# Patient Record
Sex: MALE | Race: BLACK OR AFRICAN AMERICAN | NOT HISPANIC OR LATINO | Employment: OTHER | URBAN - METROPOLITAN AREA
[De-identification: names, ages, dates, MRNs, and addresses within clinical notes are randomized per-mention and may not be internally consistent; named-entity substitution may affect disease eponyms.]

---

## 2017-01-18 ENCOUNTER — TELEPHONE (OUTPATIENT)
Dept: FAMILY MEDICINE | Facility: CLINIC | Age: 42
End: 2017-01-18

## 2017-01-18 NOTE — TELEPHONE ENCOUNTER
----- Message from Shahla Saha MD sent at 1/18/2017  8:32 AM CST -----  Please notify patient that Vitamin D level was slightly low.  Recommend oral Vitamin D3, 2000 IU's per day.    B12 was normal.

## 2017-01-20 ENCOUNTER — TELEPHONE (OUTPATIENT)
Dept: FAMILY MEDICINE | Facility: CLINIC | Age: 42
End: 2017-01-20

## 2017-01-20 NOTE — TELEPHONE ENCOUNTER
Okay Norvasc removed from list.  Patient may come in for f/u blood pressure visit in 1 month.  Also please clarify who he wants as PCP.

## 2017-01-20 NOTE — TELEPHONE ENCOUNTER
Patient wanted provider to know that when he was taking his Hyzaar 100/25mg and Norvasc 5 mg, he was experiencing some chest discomfort. He said that he took his blood pressure and it was 125/70. He said that the next day he stopped the Norvasc and has not had anymore chest discomfort.

## 2017-01-26 ENCOUNTER — HOSPITAL ENCOUNTER (EMERGENCY)
Facility: HOSPITAL | Age: 42
Discharge: HOME | End: 2017-01-26
Payer: COMMERCIAL

## 2017-01-26 VITALS
SYSTOLIC BLOOD PRESSURE: 135 MMHG | OXYGEN SATURATION: 96 % | DIASTOLIC BLOOD PRESSURE: 83 MMHG | TEMPERATURE: 98 F | WEIGHT: 290 LBS | HEART RATE: 85 BPM | HEIGHT: 73 IN | RESPIRATION RATE: 18 BRPM | BODY MASS INDEX: 38.43 KG/M2

## 2017-01-26 DIAGNOSIS — R07.9 CHEST PAIN, UNSPECIFIED TYPE: Primary | ICD-10-CM

## 2017-01-26 LAB
ALBUMIN SERPL BCP-MCNC: 3.8 G/DL
ALP SERPL-CCNC: 66 U/L
ALT SERPL W/O P-5'-P-CCNC: 29 U/L
ANION GAP SERPL CALC-SCNC: 8 MMOL/L
AST SERPL-CCNC: 23 U/L
BASOPHILS # BLD AUTO: 0.02 K/UL
BASOPHILS NFR BLD: 0.3 %
BILIRUB SERPL-MCNC: 0.5 MG/DL
BNP SERPL-MCNC: 12 PG/ML
BUN SERPL-MCNC: 16 MG/DL
CALCIUM SERPL-MCNC: 9.2 MG/DL
CHLORIDE SERPL-SCNC: 105 MMOL/L
CO2 SERPL-SCNC: 27 MMOL/L
CREAT SERPL-MCNC: 1.4 MG/DL
DIFFERENTIAL METHOD: ABNORMAL
EOSINOPHIL # BLD AUTO: 0.2 K/UL
EOSINOPHIL NFR BLD: 3.3 %
ERYTHROCYTE [DISTWIDTH] IN BLOOD BY AUTOMATED COUNT: 13.7 %
EST. GFR  (AFRICAN AMERICAN): >60 ML/MIN/1.73 M^2
EST. GFR  (NON AFRICAN AMERICAN): >60 ML/MIN/1.73 M^2
GLUCOSE SERPL-MCNC: 97 MG/DL
HCT VFR BLD AUTO: 39.6 %
HGB BLD-MCNC: 13.9 G/DL
INR PPP: 1
LYMPHOCYTES # BLD AUTO: 3 K/UL
LYMPHOCYTES NFR BLD: 40.7 %
MCH RBC QN AUTO: 30.4 PG
MCHC RBC AUTO-ENTMCNC: 35.1 %
MCV RBC AUTO: 87 FL
MONOCYTES # BLD AUTO: 0.8 K/UL
MONOCYTES NFR BLD: 10.9 %
NEUTROPHILS # BLD AUTO: 3.3 K/UL
NEUTROPHILS NFR BLD: 44.8 %
PLATELET # BLD AUTO: 221 K/UL
PMV BLD AUTO: 9.1 FL
POTASSIUM SERPL-SCNC: 3.7 MMOL/L
PROT SERPL-MCNC: 7.6 G/DL
PROTHROMBIN TIME: 10.8 SEC
RBC # BLD AUTO: 4.57 M/UL
SODIUM SERPL-SCNC: 140 MMOL/L
TROPONIN I SERPL DL<=0.01 NG/ML-MCNC: 0.01 NG/ML
WBC # BLD AUTO: 7.28 K/UL

## 2017-01-26 PROCEDURE — 85610 PROTHROMBIN TIME: CPT

## 2017-01-26 PROCEDURE — 25000003 PHARM REV CODE 250: Performed by: PHYSICIAN ASSISTANT

## 2017-01-26 PROCEDURE — 80053 COMPREHEN METABOLIC PANEL: CPT

## 2017-01-26 PROCEDURE — 99284 EMERGENCY DEPT VISIT MOD MDM: CPT

## 2017-01-26 PROCEDURE — 93005 ELECTROCARDIOGRAM TRACING: CPT

## 2017-01-26 PROCEDURE — 84484 ASSAY OF TROPONIN QUANT: CPT

## 2017-01-26 PROCEDURE — 83880 ASSAY OF NATRIURETIC PEPTIDE: CPT

## 2017-01-26 PROCEDURE — 85025 COMPLETE CBC W/AUTO DIFF WBC: CPT

## 2017-01-26 RX ORDER — ASPIRIN 325 MG
325 TABLET ORAL
Status: COMPLETED | OUTPATIENT
Start: 2017-01-26 | End: 2017-01-26

## 2017-01-26 RX ADMIN — ASPIRIN 325 MG ORAL TABLET 325 MG: 325 PILL ORAL at 09:01

## 2017-01-26 NOTE — ED AVS SNAPSHOT
[facility]  [place] [address]               Layla Siegel   2017  9:08 PM   ED    Description:  Male : 1975   Department:  [facility]           Your Care was Coordinated By:     Provider Role From To    Raul Braun MD Attending Provider 17 --    Justin Marshall PA-C Physician Assistant 17 --      Reason for Visit     Chest Pain           Diagnoses this Visit        Comments    Chest pain, unspecified type    -  Primary       ED Disposition     None           To Do List           Follow-up Information     Go to [facility].    Specialty:  Emergency Medicine    Why:  If symptoms worsen    Contact information:    [place] [address] [phone]        Follow up with Mumtaz Loera MD. Schedule an appointment as soon as possible for a visit in 1 day.    Specialty:  Family Medicine    Why:  For follow-up care    Contact information:    [address] [place] [address]  [phone]         These Medications        Disp Refills Start End    ranitidine (ZANTAC) 150 MG capsule 15 capsule 0 2017    Take 1 capsule (150 mg total) by mouth nightly. - Oral    Pharmacy: [id number]  [address]O [address] Ph #: [phone]         [facility] On Call     [facility] On Call Nurse Care Line -  Assistance  Registered nurses in the [facility] provide clinical advisement, health education, appointment booking, and other advisory services.  Call for this free service at [phone].             Medications           Message regarding Medications     Verify the changes and/or additions to your medication regime listed below are the same as discussed with your clinician today.  If any of these changes or additions are incorrect, please notify your healthcare provider.        START taking these NEW medications         "Refills    ranitidine (ZANTAC) 150 MG capsule 0    Sig: Take 1 capsule (150 mg total) by mouth nightly.    Class: Print    Route: Oral      These medications were administered today        Dose Freq    aspirin tablet 325 mg 325 mg ED 1 Time    Sig: Take 1 tablet (325 mg total) by mouth ED 1 Time.    Class: Normal    Route: Oral    Cosign for Ordering: Required by Raul Braun MD      STOP taking these medications     atorvastatin (LIPITOR) 10 MG tablet Take 1 tablet (10 mg total) by mouth once daily.           Verify that the below list of medications is an accurate representation of the medications you are currently taking.  If none reported, the list may be blank. If incorrect, please contact your healthcare provider. Carry this list with you in case of emergency.           Current Medications     losartan-hydrochlorothiazide 100-25 mg (HYZAAR) 100-25 mg per tablet Take 1 tablet by mouth once daily.    ranitidine (ZANTAC) 150 MG capsule Take 1 capsule (150 mg total) by mouth nightly.           Clinical Reference Information           Your Vitals Were     BP Pulse Temp Resp Height Weight    133/50 (BP Location: Right arm, Patient Position: Sitting) 91 98.7 °F (37.1 °C) (Oral) 18 6' 1" (1.854 m) 131.5 kg (290 lb)    SpO2 BMI             96% 38.26 kg/m2         Allergies as of 1/26/2017     No Known Allergies      Immunizations Administered on Date of Encounter - 1/26/2017     None      ED Micro, Lab, POCT     Start Ordered       Status Ordering Provider    01/26/17 2052 01/26/17 2051  CBC auto differential  Once      Final result     01/26/17 2052 01/26/17 2051  Comprehensive metabolic panel  Once      Final result     01/26/17 2052 01/26/17 2051  Brain natriuretic peptide  Once      Final result     01/26/17 2052 01/26/17 2051  Troponin I  Once      Final result     01/26/17 2052 01/26/17 2051  Protime-INR  Once      Final result       ED Imaging Orders     Start Ordered       Status Ordering Provider    " 01/26/17 2052 01/26/17 2051  X-Ray Chest PA And Lateral  1 time imaging      Final result       Discharge References/Attachments     CHEST PAIN, UNCERTAIN CAUSE (ENGLISH)      Your Scheduled Appointments     Mar 31, 2017  1:00 PM CDT   Consult with Henrique Slater MD   [facility]    [address] [facility]  [place] [address]   [phone]              [facility] Sign-Up     Activating your [facility] account is as easy as 1-2-3!     1) Visit [email or web address], select Sign Up Now, enter this activation code and your date of birth, then select Next.  [email or web address]  Expires: 3/12/2017  9:59 PM      2) Create a username and password to use when you visit [facility] in the future and select a security question in case you lose your password and select Next.    3) Enter your e-mail address and click Sign Up!    Additional Information  If you have questions, please e-mail [email or web address] or call [phone] to talk to our [facility] staff. Remember, [facility] is NOT to be used for urgent needs. For medical emergencies, dial 911.          [facility] complies with applicable Federal civil rights laws and does not discriminate on the basis of race, color, national origin, age, disability, or sex.        Language Assistance Services     ATTENTION: Language assistance services are available, free of charge. Please call [phone].      ATENCIÓN: Si habla español, tiene a argueta disposición servicios gratuitos de asistencia lingüística. Llame al [phone].     CHÚ Ý: N?u b?n nói Ti?ng Vi?t, có các d?ch v? h? tr? ngôn ng? mi?n phí dành cho b?n. G?i s? [phone].

## 2017-01-27 NOTE — ED TRIAGE NOTES
Pt states that chest pain for a while and progressively gotten worse.  Chest pain across the upper portion of chest wall.  Denies any radiating of pain.  Denies any nausea, vomiting or diarrhea.  Denies any dizziness or lightheadedness.

## 2017-01-27 NOTE — ED PROVIDER NOTES
Encounter Date: 1/26/2017       History     Chief Complaint   Patient presents with    Chest Pain     all day my chest has been feeling funny, stabbing pain on one side of chest. intermittant.     Review of patient's allergies indicates:  No Known Allergies  HPI Comments: This is a 41-year-old male with hypertension who presents to the ED complaining of intermittent chest pain ×2 days.  He is currently asymptomatic with the last episode occurring earlier this evening.  He describes the pain as sore, like a pulled muscle.  The pain is slightly left of midline.  The pain last a few minutes and has resolved spontaneously.  He is not aware of anything that provokes or causes the pain.  He denies heavy lifting recently.  He denies fever, cough, nausea vomiting, diaphoresis, syncope or dizziness, leg swelling or calf pain.    The history is provided by the patient.     Past Medical History   Diagnosis Date    High cholesterol     Hypertension      Past Medical History Pertinent Negatives   Diagnosis Date Noted    Arthritis 12/16/2014    CHF (congestive heart failure) 12/16/2014    Seizures 12/16/2014    Stroke 12/16/2014     Past Surgical History   Procedure Laterality Date    Colonoscopy N/A 12/15/2015     Procedure: COLONOSCOPY;  Surgeon: Horace Bella MD;  Location: [facility];  Service: Endoscopy;  Laterality: N/A;     Family History   Problem Relation Age of Onset    Hypertension Mother     Heart disease Maternal Grandfather     Heart disease Paternal Grandfather      Social History   Substance Use Topics    Smoking status: Never Smoker    Smokeless tobacco: None    Alcohol use Yes      Comment: occasional     Review of Systems   Constitutional: Negative for chills, diaphoresis, fatigue and fever.   HENT: Negative for rhinorrhea and sore throat.    Respiratory: Negative for cough, chest tightness and shortness of breath.    Cardiovascular: Positive for chest pain. Negative for palpitations and leg  swelling.   Gastrointestinal: Negative for nausea and vomiting.   Neurological: Negative for dizziness, syncope and light-headedness.       Physical Exam   Initial Vitals   BP Pulse Resp Temp SpO2   01/26/17 2049 01/26/17 2049 01/26/17 2049 01/26/17 2049 01/26/17 2049   133/50 91 18 98.7 °F (37.1 °C) 96 %     Physical Exam    Vitals reviewed.  Constitutional: He appears well-developed and well-nourished. He is not diaphoretic. No distress.   HENT:   Head: Normocephalic and atraumatic.   Right Ear: External ear normal.   Left Ear: External ear normal.   Nose: Nose normal.   Eyes: Conjunctivae are normal. No scleral icterus.   Neck: Normal range of motion. Neck supple.   Cardiovascular: Normal rate, regular rhythm, normal heart sounds and intact distal pulses.   Pulmonary/Chest: Breath sounds normal. No respiratory distress. He has no wheezes. He has no rhonchi. He has no rales. He exhibits no tenderness.   Musculoskeletal: Normal range of motion. He exhibits no edema or tenderness.   Neurological: He is alert and oriented to person, place, and time.   Skin: Skin is warm and dry. No rash noted. No erythema.         ED Course   Procedures  Labs Reviewed   CBC W/ AUTO DIFFERENTIAL - Abnormal; Notable for the following:        Result Value    RBC 4.57 (*)     Hemoglobin 13.9 (*)     Hematocrit 39.6 (*)     MPV 9.1 (*)     All other components within normal limits   COMPREHENSIVE METABOLIC PANEL   B-TYPE NATRIURETIC PEPTIDE   TROPONIN I   PROTIME-INR     EKG Readings: (Independently Interpreted)   Normal sinus rhythm 87 bpm left axis deviation normal criteria for LVH nonspecific ST-T changes no old EKG       X-Rays:   Independently Interpreted Readings:   Other Readings:  Chest x-ray without acute infiltrate    Medical Decision Making:   History:   Old Medical Records: I decided to obtain old medical records.    Emergent evaluation of a 41-year-old male with hypertension complaining of intermittent episodes of chest pain  ×2 days.  Currently asymptomatic.  He presents in no distress with stable vital signs.  Cardiac exam reveals normal heart sounds with no murmurs clicks or rubs.  There is no reproducible pain.  Lungs sounds are clear with normal work of breathing.  No JVD or peripheral edema.  Chest x-ray shows no evidence for pneumonia, pneumothorax, effusion.  EKG shows normal sinus rhythm with LVH.  No ST elevation or depression.  Troponin is normal.  INR is 1.  BNP is 12.  I doubt ACS, CHF, PE, dissection.  Labs show mild normocytic anemia.  Leukocytosis.  No electrolyte derangement.  Patient treated with aspirin the ED.  He was discharged asymptomatic.  I am not certain of patient's chest pain etiology but I do not suspect serious intrathoracic pathology.  I also doubt chest wall inflammation.  This may be gastroesophageal and I will discharge with prescription for a trial of Zantac.  I doubt Boerhaave's.  Patient given return precautions advised follow-up with PCP.  Case discussed with Dr. Braun.            Attending Attestation:     Physician Attestation Statement for NP/PA:   I have conducted a face to face encounter with this patient in addition to the NP/PA, due to Medical Complexity    Other NP/PA Attestation Additions:    History of Present Illness: Old records reveal a negative stress test in 2013.   Physical Exam: No chest wall tenderness                  ED Course     Clinical Impression:   The encounter diagnosis was Chest pain, unspecified type.          Justin Marshall PA-C  01/27/17 0115

## 2017-07-24 ENCOUNTER — OFFICE VISIT (OUTPATIENT)
Dept: FAMILY MEDICINE | Facility: CLINIC | Age: 42
End: 2017-07-24
Payer: COMMERCIAL

## 2017-07-24 VITALS
SYSTOLIC BLOOD PRESSURE: 144 MMHG | TEMPERATURE: 98 F | OXYGEN SATURATION: 97 % | DIASTOLIC BLOOD PRESSURE: 96 MMHG | WEIGHT: 291.69 LBS | HEIGHT: 73 IN | BODY MASS INDEX: 38.66 KG/M2 | HEART RATE: 66 BPM | RESPIRATION RATE: 14 BRPM

## 2017-07-24 DIAGNOSIS — I10 BENIGN ESSENTIAL HTN: Primary | ICD-10-CM

## 2017-07-24 DIAGNOSIS — E66.9 OBESITY (BMI 30-39.9): ICD-10-CM

## 2017-07-24 PROCEDURE — 99214 OFFICE O/P EST MOD 30 MIN: CPT | Mod: S$GLB,,, | Performed by: FAMILY MEDICINE

## 2017-07-24 PROCEDURE — 99999 PR PBB SHADOW E&M-EST. PATIENT-LVL III: CPT | Mod: PBBFAC,,, | Performed by: FAMILY MEDICINE

## 2017-07-24 RX ORDER — AMLODIPINE BESYLATE 5 MG/1
5 TABLET ORAL DAILY
Qty: 90 TABLET | Refills: 3 | Status: SHIPPED | OUTPATIENT
Start: 2017-07-24 | End: 2018-01-23 | Stop reason: SDUPTHER

## 2017-07-24 NOTE — PROGRESS NOTES
"Routine Office Visit    Patient Name: Layla Siegel    : 1975  MRN: [id number]    Subjective:  Layla is a 41 y.o. male who presents today for:    1.  HTN - ran out of norvasc a while ago.  He states that he's "shaking more like jerking" before sleep so he knows his BP is elevated. He hasn't taken it at home recently. He denies headaches, SOB, CP.  He works outside and sweats a lot, and does drink more than a gallon of water per day.  He also drinks 3 sodas a day.  He took his amlodipine-lisinopril BP medication this morning but not yesterday.    He states he was initially here for stomach pains but he took Gas-X and now it is resolved.    Past Medical History  Past Medical History:   Diagnosis Date    High cholesterol     Hypertension        Past Surgical History  Past Surgical History:   Procedure Laterality Date    COLONOSCOPY N/A 12/15/2015    Procedure: COLONOSCOPY;  Surgeon: Horace Bella MD;  [facility]: [facility];  Service: Endoscopy;  Laterality: N/A;       Family History  Family History   Problem Relation Age of Onset    Hypertension Mother     Heart disease Maternal Grandfather     Heart disease Paternal Grandfather        Social History  Social History     Social History    Marital status:      Spouse name: N/A    Number of children: N/A    Years of education: N/A     Occupational History    Not on file.     Social History Main Topics    Smoking status: Never Smoker    Smokeless tobacco: Never Used    Alcohol use Yes      Comment: occasional    Drug use: No    Sexual activity: Yes     Partners: Female     Birth control/ protection: None     Other Topics Concern    Not on file     Social History Narrative    No narrative on file       Current Medications  Current Outpatient Prescriptions on File Prior to Visit   Medication Sig Dispense Refill    losartan-hydrochlorothiazide 100-25 mg (HYZAAR) 100-25 mg per tablet Take 1 tablet by mouth once daily. 90 tablet 3    ranitidine " "(ZANTAC) 150 MG capsule Take 1 capsule (150 mg total) by mouth nightly. 15 capsule 0     No current facility-administered medications on file prior to visit.        Allergies   Review of patient's allergies indicates:  No Known Allergies    Review of Systems (Pertinent positives)  Throat: hoarseness, bad breath, swelling, sore throat  Teeth: toothache, caries, dentures, bleeding gums  Lungs: Cough, sputum,  wheeze, frequent URI, SOA, Asthma  Heart: Chest pain, angina, palpitations, extra heart beats  Stomach/Intestine: Heartburn, Nausea, vomiting, diarrhea, indigestion, bloating, constipation  Bones/Muscles/Joints: Joint pain, deformities, back pain, swelling, stiffness  Brain: tremor, fainting, headaches, muscle weakness, frequent falls    BP (!) 144/96 (BP Location: Left arm, Patient Position: Sitting, BP Method: Manual)   Pulse 66   Temp 98 °F (36.7 °C) (Oral)   Resp 14   Ht 6' 1" (1.854 m)   Wt 132.3 kg (291 lb 10.7 oz)   SpO2 97%   BMI 38.48 kg/m²     GENERAL APPEARANCE: in no apparent distress and well developed and well nourished  RESPIRATORY: appears well, vitals normal, no respiratory distress, acyanotic, normal RR, chest clear, no wheezing, crepitations, rhonchi, normal symmetric air entry  NEUROLOGIC: normal without focal findings, CN II-XII are intact.  Extremities: warm/well perfused.  No abnormal hair patterns.  No clubbing, cyanosis or edema.    SKIN: no rashes, no wounds, no other lesions  PSYCH: Alert, oriented x 3, thought content appropriate, speech normal, pleasant and cooperative, good eye contact, well groomed, recall good, concentration/judgement good and apparently average intelligence.    Assessment/Plan:  Layla Siegel is a 41 y.o. male who presents today for :    Layla was seen today for back pain.    Diagnoses and all orders for this visit:    Benign essential HTN  -     amlodipine (NORVASC) 5 MG tablet; Take 1 tablet (5 mg total) by mouth once daily.    Obesity (BMI 30-39.9)  -  " "Discussed protein intake. Discussed cutting down on sodas, gatorade, and drinking water.  "But water makes my stomach hurt."  Discussed eating something with water and eating 3 meals a day.  Drinks alkaline water, discussed drinking regular water.    F/u 3 months - HTN    "

## 2017-08-27 ENCOUNTER — HOSPITAL ENCOUNTER (EMERGENCY)
Facility: HOSPITAL | Age: 42
Discharge: HOME | End: 2017-08-28
Attending: EMERGENCY MEDICINE
Payer: COMMERCIAL

## 2017-08-27 DIAGNOSIS — K80.50 BILIARY COLIC: ICD-10-CM

## 2017-08-27 DIAGNOSIS — R10.9 ABDOMINAL PAIN, UNSPECIFIED LOCATION: Primary | ICD-10-CM

## 2017-08-27 DIAGNOSIS — R11.10 VOMITING, INTRACTABILITY OF VOMITING NOT SPECIFIED, PRESENCE OF NAUSEA NOT SPECIFIED, UNSPECIFIED VOMITING TYPE: ICD-10-CM

## 2017-08-27 LAB
ALBUMIN SERPL BCP-MCNC: 4 G/DL
ALP SERPL-CCNC: 73 U/L
ALT SERPL W/O P-5'-P-CCNC: 30 U/L
ANION GAP SERPL CALC-SCNC: 11 MMOL/L
AST SERPL-CCNC: 20 U/L
BASOPHILS # BLD AUTO: 0.01 K/UL
BASOPHILS NFR BLD: 0.2 %
BILIRUB SERPL-MCNC: 0.4 MG/DL
BNP SERPL-MCNC: <10 PG/ML
BUN SERPL-MCNC: 18 MG/DL
CALCIUM SERPL-MCNC: 9.5 MG/DL
CHLORIDE SERPL-SCNC: 103 MMOL/L
CO2 SERPL-SCNC: 26 MMOL/L
CREAT SERPL-MCNC: 1.1 MG/DL
DIFFERENTIAL METHOD: ABNORMAL
EOSINOPHIL # BLD AUTO: 0.2 K/UL
EOSINOPHIL NFR BLD: 3.6 %
ERYTHROCYTE [DISTWIDTH] IN BLOOD BY AUTOMATED COUNT: 13.6 %
EST. GFR  (AFRICAN AMERICAN): >60 ML/MIN/1.73 M^2
EST. GFR  (NON AFRICAN AMERICAN): >60 ML/MIN/1.73 M^2
GLUCOSE SERPL-MCNC: 149 MG/DL
HCT VFR BLD AUTO: 39 %
HGB BLD-MCNC: 13.9 G/DL
LIPASE SERPL-CCNC: 36 U/L
LYMPHOCYTES # BLD AUTO: 3.4 K/UL
LYMPHOCYTES NFR BLD: 51 %
MCH RBC QN AUTO: 30.3 PG
MCHC RBC AUTO-ENTMCNC: 35.6 G/DL
MCV RBC AUTO: 85 FL
MONOCYTES # BLD AUTO: 0.5 K/UL
MONOCYTES NFR BLD: 8 %
NEUTROPHILS # BLD AUTO: 2.5 K/UL
NEUTROPHILS NFR BLD: 37.2 %
PLATELET # BLD AUTO: 214 K/UL
PMV BLD AUTO: 9.1 FL
POTASSIUM SERPL-SCNC: 3.3 MMOL/L
PROT SERPL-MCNC: 8.5 G/DL
RBC # BLD AUTO: 4.58 M/UL
SODIUM SERPL-SCNC: 140 MMOL/L
TROPONIN I SERPL DL<=0.01 NG/ML-MCNC: <0.006 NG/ML
WBC # BLD AUTO: 6.63 K/UL

## 2017-08-27 PROCEDURE — 83880 ASSAY OF NATRIURETIC PEPTIDE: CPT

## 2017-08-27 PROCEDURE — 80053 COMPREHEN METABOLIC PANEL: CPT

## 2017-08-27 PROCEDURE — 84484 ASSAY OF TROPONIN QUANT: CPT

## 2017-08-27 PROCEDURE — 93005 ELECTROCARDIOGRAM TRACING: CPT

## 2017-08-27 PROCEDURE — 63600175 PHARM REV CODE 636 W HCPCS: Performed by: EMERGENCY MEDICINE

## 2017-08-27 PROCEDURE — 25000003 PHARM REV CODE 250: Performed by: EMERGENCY MEDICINE

## 2017-08-27 PROCEDURE — 85025 COMPLETE CBC W/AUTO DIFF WBC: CPT

## 2017-08-27 PROCEDURE — 83690 ASSAY OF LIPASE: CPT

## 2017-08-27 PROCEDURE — 96374 THER/PROPH/DIAG INJ IV PUSH: CPT

## 2017-08-27 PROCEDURE — 99285 EMERGENCY DEPT VISIT HI MDM: CPT | Mod: 25

## 2017-08-27 PROCEDURE — 96372 THER/PROPH/DIAG INJ SC/IM: CPT

## 2017-08-27 RX ORDER — ONDANSETRON 2 MG/ML
8 INJECTION INTRAMUSCULAR; INTRAVENOUS
Status: COMPLETED | OUTPATIENT
Start: 2017-08-27 | End: 2017-08-27

## 2017-08-27 RX ADMIN — LIDOCAINE HYDROCHLORIDE: 20 SOLUTION ORAL; TOPICAL at 09:08

## 2017-08-27 RX ADMIN — ONDANSETRON 8 MG: 2 INJECTION INTRAMUSCULAR; INTRAVENOUS at 09:08

## 2017-08-27 NOTE — ED TRIAGE NOTES
"Pt to ED with c/o of Epigastric ABD pain which began yesterday and became more constant this morning around 0900. Pt states "I don't know how to describe it, it just hurts." Pt reports NV which began this morning, pt reports chills, denies fever. Pt reports "very small amounts of stool, no like normal" BM. Pt reports mid sternal chest pain which decreased since pt has laid down on stretcher.   "

## 2017-08-28 VITALS
BODY MASS INDEX: 38.83 KG/M2 | DIASTOLIC BLOOD PRESSURE: 82 MMHG | SYSTOLIC BLOOD PRESSURE: 142 MMHG | OXYGEN SATURATION: 99 % | TEMPERATURE: 99 F | WEIGHT: 293 LBS | HEIGHT: 73 IN | RESPIRATION RATE: 18 BRPM | HEART RATE: 75 BPM

## 2017-08-28 PROCEDURE — 96375 TX/PRO/DX INJ NEW DRUG ADDON: CPT

## 2017-08-28 PROCEDURE — 63600175 PHARM REV CODE 636 W HCPCS: Performed by: EMERGENCY MEDICINE

## 2017-08-28 RX ORDER — HYDROCODONE BITARTRATE AND ACETAMINOPHEN 5; 325 MG/1; MG/1
1 TABLET ORAL EVERY 6 HOURS PRN
Qty: 20 TABLET | Refills: 0 | Status: ON HOLD | OUTPATIENT
Start: 2017-08-28 | End: 2017-08-31

## 2017-08-28 RX ORDER — DICYCLOMINE HYDROCHLORIDE 10 MG/ML
20 INJECTION INTRAMUSCULAR
Status: COMPLETED | OUTPATIENT
Start: 2017-08-28 | End: 2017-08-28

## 2017-08-28 RX ORDER — HYDROMORPHONE HYDROCHLORIDE 2 MG/ML
0.5 INJECTION, SOLUTION INTRAMUSCULAR; INTRAVENOUS; SUBCUTANEOUS
Status: COMPLETED | OUTPATIENT
Start: 2017-08-28 | End: 2017-08-28

## 2017-08-28 RX ORDER — ONDANSETRON 8 MG/1
8 TABLET, ORALLY DISINTEGRATING ORAL EVERY 8 HOURS PRN
Qty: 20 TABLET | Refills: 0 | Status: SHIPPED | OUTPATIENT
Start: 2017-08-28 | End: 2018-01-08 | Stop reason: ALTCHOICE

## 2017-08-28 RX ADMIN — HYDROMORPHONE HYDROCHLORIDE 0.5 MG: 2 INJECTION INTRAMUSCULAR; INTRAVENOUS; SUBCUTANEOUS at 12:08

## 2017-08-28 RX ADMIN — DICYCLOMINE HYDROCHLORIDE 20 MG: 10 INJECTION INTRAMUSCULAR at 12:08

## 2017-08-28 NOTE — ED PROVIDER NOTES
Encounter Date: 8/27/2017    SCRIBE #1 NOTE: I, Germaine Juarez, am scribing for, and in the presence of,  Wagner Grant MD. I have scribed the following portions of the note - Other sections scribed: HPI, ROS.       History     Chief Complaint   Patient presents with    Chest Pain     epigastric/midsternal aching chest pain since last night; intermittent; worse after eating; hx of htn    Abdominal Pain     CC: Abdominal Pain; Chest Pain    HPI: 41 year old male with HTN and high cholesterol presents to the ED c/o epigastric and LLQ abdominal pain, nausea, NBNB emesis, constipation, and midsternal chest pain x 2 days. Pt describes the chest pain as feeling indigested. Pt states he vomited x 2 today. Pt reports eating eggs from [facility] prior to symptoms. He notes to previous similar hx in the past when he ate eggs at [facility]. Pt states his BMs are typically normal. Pain is moderate (7/10) and constant. Pt is also c/o testicle pain that began while pt was waiting to be seen. Pt otherwise denies fever, chills, SOB, diarrhea, changes in urinary habits, penile discharge, dysuria, and any other associated symptoms. No prior attempted treatment. No alleviating factors. No hx of abdominal surgeries. Pt is not a smoker. No daily EtOH or marijuana use. Pt does not take tylenols daily.      The history is provided by the patient. No  was used.     Review of patient's allergies indicates:  No Known Allergies  Past Medical History:   Diagnosis Date    High cholesterol     Hypertension      Past Surgical History:   Procedure Laterality Date    COLONOSCOPY N/A 12/15/2015    Procedure: COLONOSCOPY;  Surgeon: Horace Bella MD;  Location: [facility];  Service: Endoscopy;  Laterality: N/A;     Family History   Problem Relation Age of Onset    Hypertension Mother     Heart disease Maternal Grandfather     Heart disease Paternal Grandfather      Social History   Substance Use Topics    Smoking status: Never  Smoker    Smokeless tobacco: Never Used    Alcohol use Yes      Comment: occasional     Review of Systems   Constitutional: Negative for chills, diaphoresis and fever.   HENT: Negative for ear pain and sore throat.    Eyes: Negative for photophobia and visual disturbance.   Respiratory: Negative for cough and shortness of breath.    Cardiovascular: Positive for chest pain.   Gastrointestinal: Positive for abdominal pain, constipation, nausea and vomiting. Negative for blood in stool and diarrhea.   Genitourinary: Positive for testicular pain. Negative for discharge, dysuria, frequency and hematuria.   Musculoskeletal: Negative for back pain.   Skin: Negative for rash.   Neurological: Negative for headaches.       Physical Exam     Initial Vitals [08/27/17 1737]   BP Pulse Resp Temp SpO2   (!) 143/74 85 20 98.3 °F (36.8 °C) 99 %      MAP       97         Physical Exam    Nursing note and vitals reviewed.  Constitutional: He appears well-developed and well-nourished. No distress.   HENT:   Head: Atraumatic.   Eyes: EOM are normal. Pupils are equal, round, and reactive to light.   Neck: Normal range of motion. Neck supple. No JVD present.   Cardiovascular: Normal rate, regular rhythm, normal heart sounds and intact distal pulses. Exam reveals no gallop and no friction rub.    No murmur heard.  Pulmonary/Chest: Breath sounds normal. No respiratory distress. He has no wheezes. He has no rhonchi. He has no rales. He exhibits no tenderness.   Abdominal: Soft. Bowel sounds are normal. There is tenderness. There is no rebound and no guarding.   Diffuse abdominal tenderness.  Was pronounced epigastric and right upper quadrant.  No Carrillo sign.   Musculoskeletal: Normal range of motion. He exhibits no edema or tenderness.   Lymphadenopathy:     He has no cervical adenopathy.   Neurological: He is alert and oriented to person, place, and time. He has normal strength and normal reflexes. He displays normal reflexes. No cranial  nerve deficit or sensory deficit.   Skin: Skin is warm and dry. Capillary refill takes less than 2 seconds. No rash noted.   Psychiatric: He has a normal mood and affect. Thought content normal.         ED Course   Procedures  Labs Reviewed   CBC W/ AUTO DIFFERENTIAL - Abnormal; Notable for the following:        Result Value    RBC 4.58 (*)     Hemoglobin 13.9 (*)     Hematocrit 39.0 (*)     MPV 9.1 (*)     Gran% 37.2 (*)     Lymph% 51.0 (*)     All other components within normal limits   COMPREHENSIVE METABOLIC PANEL - Abnormal; Notable for the following:     Potassium 3.3 (*)     Glucose 149 (*)     Total Protein 8.5 (*)     All other components within normal limits   TROPONIN I   B-TYPE NATRIURETIC PEPTIDE   LIPASE   URINALYSIS        Patient has a CT abdomen and pelvis that showed possible enteritis.  Diverticulosis without evidence of diverticulitis and gallstones.  Radiology recommended RUQ ultrasound due to the equivocal gallbladder findings on CT.  RUQ ultrasound performed and there is no evidence of cholecystitis or choledocholithiasis.  Lab work is normal.  Patient's nausea and pain is controlled.  Vital signs are stable.  We'll discharge with pain medicine and nausea medicine and have patient follow-up with general surgery.                Scribe Attestation:   Scribe #1: I performed the above scribed service and the documentation accurately describes the services I performed. I attest to the accuracy of the note.    Attending Attestation:           Physician Attestation for Scribe:  Physician Attestation Statement for Scribe #1: I, Wagner Grant MD, reviewed documentation, as scribed by Germaine Juarez in my presence, and it is both accurate and complete.                 ED Course     Clinical Impression:   The primary encounter diagnosis was Abdominal pain, unspecified location. Diagnoses of Vomiting, intractability of vomiting not specified, presence of nausea not specified, unspecified vomiting type  and Biliary colic were also pertinent to this visit.                           Wagner Grant MD  08/28/17 0022

## 2017-08-30 ENCOUNTER — ANESTHESIA EVENT (OUTPATIENT)
Dept: SURGERY | Facility: HOSPITAL | Age: 42
End: 2017-08-30
Payer: COMMERCIAL

## 2017-08-30 ENCOUNTER — HOSPITAL ENCOUNTER (OUTPATIENT)
Dept: PREADMISSION TESTING | Facility: HOSPITAL | Age: 42
Discharge: HOME | End: 2017-08-30
Attending: SURGERY
Payer: COMMERCIAL

## 2017-08-30 VITALS
SYSTOLIC BLOOD PRESSURE: 115 MMHG | TEMPERATURE: 98 F | OXYGEN SATURATION: 96 % | WEIGHT: 286 LBS | HEART RATE: 50 BPM | HEIGHT: 73 IN | RESPIRATION RATE: 18 BRPM | BODY MASS INDEX: 37.91 KG/M2 | DIASTOLIC BLOOD PRESSURE: 68 MMHG

## 2017-08-30 DIAGNOSIS — Z01.818 PRE-OP TESTING: Primary | ICD-10-CM

## 2017-08-30 LAB
ANION GAP SERPL CALC-SCNC: 10 MMOL/L
BASOPHILS # BLD AUTO: 0.02 K/UL
BASOPHILS NFR BLD: 0.2 %
BUN SERPL-MCNC: 16 MG/DL
CALCIUM SERPL-MCNC: 10 MG/DL
CHLORIDE SERPL-SCNC: 100 MMOL/L
CO2 SERPL-SCNC: 28 MMOL/L
CREAT SERPL-MCNC: 1.3 MG/DL
DIFFERENTIAL METHOD: ABNORMAL
EOSINOPHIL # BLD AUTO: 0.3 K/UL
EOSINOPHIL NFR BLD: 3.4 %
ERYTHROCYTE [DISTWIDTH] IN BLOOD BY AUTOMATED COUNT: 13.8 %
EST. GFR  (AFRICAN AMERICAN): >60 ML/MIN/1.73 M^2
EST. GFR  (NON AFRICAN AMERICAN): >60 ML/MIN/1.73 M^2
GLUCOSE SERPL-MCNC: 98 MG/DL
HCT VFR BLD AUTO: 39.1 %
HGB BLD-MCNC: 14 G/DL
LYMPHOCYTES # BLD AUTO: 2.8 K/UL
LYMPHOCYTES NFR BLD: 33.9 %
MCH RBC QN AUTO: 30.8 PG
MCHC RBC AUTO-ENTMCNC: 35.8 G/DL
MCV RBC AUTO: 86 FL
MONOCYTES # BLD AUTO: 0.9 K/UL
MONOCYTES NFR BLD: 10.7 %
NEUTROPHILS # BLD AUTO: 4.3 K/UL
NEUTROPHILS NFR BLD: 51.8 %
PLATELET # BLD AUTO: 214 K/UL
PMV BLD AUTO: 9.2 FL
POTASSIUM SERPL-SCNC: 4.1 MMOL/L
RBC # BLD AUTO: 4.54 M/UL
SODIUM SERPL-SCNC: 138 MMOL/L
WBC # BLD AUTO: 8.23 K/UL

## 2017-08-30 PROCEDURE — 36415 COLL VENOUS BLD VENIPUNCTURE: CPT

## 2017-08-30 PROCEDURE — 85025 COMPLETE CBC W/AUTO DIFF WBC: CPT

## 2017-08-30 PROCEDURE — 80048 BASIC METABOLIC PNL TOTAL CA: CPT

## 2017-08-30 NOTE — DISCHARGE INSTRUCTIONS
Your surgery is scheduled for_8/31/2017________________.    Report to SAME DAY SURGERY UNIT at __6:30 am_____am on the 2nd floor of the hospital.  Use the front entrance of the hospital before 6 am.  If you need wheelchair assistance, call [phone] from your cell phone,  or call 0 from the courtesy phone in the hospital lobby.    Important instructions:   Do not eat or drink after 12 midnight, including water.  It is okay to brush your teeth.  Do not have gum, candy or mints.     Take only these medications with a small swallow of water on the morning of your surgery.__amlodipine     Please shower the night before and the morning of your surgery.       Use Hibiclens soap to your surgery site if instructed by your pre op nurse.   If your surgery is on your abdomen, be sure to wash your naval.  Be sure to rinse off Hibiclens after it is on your skin for several minutes.  Do not use Hibiclens on your face or genitals.      You may wear deodorant only.      Do not wear powder, body lotion or cologne.     Do not wear any jewelry or have any metal on your body.     Please bring any documents given to you by your doctor.     If you are going home on the same day of surgery, you must have arrangements for a ride home.  You will not be able to drive home if you were given anesthesia or sedation.     Stop taking Aspirin, Ibuprofen, Motrin and Aleve at least 3-5 days before your surgery. You may use Tylenol.     Stop taking fish oil and vitamin E for least 5 days before surgery.     Wear loose fitting clothes allowing for bandages.     Please leave money and valuables home.       You may bring your cell phone.     Call the doctor if fever or illness should occur before your surgery.    Call [phone] to contact us here at [facility] if needed.

## 2017-08-31 ENCOUNTER — SURGERY (OUTPATIENT)
Age: 42
End: 2017-08-31

## 2017-08-31 ENCOUNTER — ANESTHESIA (OUTPATIENT)
Dept: SURGERY | Facility: HOSPITAL | Age: 42
End: 2017-08-31
Payer: COMMERCIAL

## 2017-08-31 ENCOUNTER — HOSPITAL ENCOUNTER (OUTPATIENT)
Facility: HOSPITAL | Age: 42
Discharge: HOME | End: 2017-08-31
Attending: SURGERY | Admitting: SURGERY
Payer: COMMERCIAL

## 2017-08-31 ENCOUNTER — NURSE TRIAGE (OUTPATIENT)
Dept: ADMINISTRATIVE | Facility: CLINIC | Age: 42
End: 2017-08-31

## 2017-08-31 VITALS
SYSTOLIC BLOOD PRESSURE: 141 MMHG | DIASTOLIC BLOOD PRESSURE: 80 MMHG | OXYGEN SATURATION: 96 % | TEMPERATURE: 99 F | BODY MASS INDEX: 37.91 KG/M2 | HEIGHT: 73 IN | RESPIRATION RATE: 16 BRPM | HEART RATE: 82 BPM | WEIGHT: 286 LBS

## 2017-08-31 DIAGNOSIS — K81.2: Primary | ICD-10-CM

## 2017-08-31 PROCEDURE — 63600175 PHARM REV CODE 636 W HCPCS: Performed by: NURSE ANESTHETIST, CERTIFIED REGISTERED

## 2017-08-31 PROCEDURE — 88304 TISSUE EXAM BY PATHOLOGIST: CPT | Performed by: PATHOLOGY

## 2017-08-31 PROCEDURE — 71000016 HC POSTOP RECOV ADDL HR: Performed by: SURGERY

## 2017-08-31 PROCEDURE — 27201423 OPTIME MED/SURG SUP & DEVICES STERILE SUPPLY: Performed by: SURGERY

## 2017-08-31 PROCEDURE — 25000003 PHARM REV CODE 250: Performed by: NURSE ANESTHETIST, CERTIFIED REGISTERED

## 2017-08-31 PROCEDURE — D9220A PRA ANESTHESIA: Mod: CRNA,,, | Performed by: NURSE ANESTHETIST, CERTIFIED REGISTERED

## 2017-08-31 PROCEDURE — 25000003 PHARM REV CODE 250: Performed by: SURGERY

## 2017-08-31 PROCEDURE — 37000008 HC ANESTHESIA 1ST 15 MINUTES: Performed by: SURGERY

## 2017-08-31 PROCEDURE — 71000015 HC POSTOP RECOV 1ST HR: Performed by: SURGERY

## 2017-08-31 PROCEDURE — 36000709 HC OR TIME LEV III EA ADD 15 MIN: Performed by: SURGERY

## 2017-08-31 PROCEDURE — 63600175 PHARM REV CODE 636 W HCPCS: Performed by: ANESTHESIOLOGY

## 2017-08-31 PROCEDURE — 25000003 PHARM REV CODE 250: Performed by: ANESTHESIOLOGY

## 2017-08-31 PROCEDURE — 88304 TISSUE EXAM BY PATHOLOGIST: CPT | Mod: 26,,, | Performed by: PATHOLOGY

## 2017-08-31 PROCEDURE — 37000009 HC ANESTHESIA EA ADD 15 MINS: Performed by: SURGERY

## 2017-08-31 PROCEDURE — 71000033 HC RECOVERY, INTIAL HOUR: Performed by: SURGERY

## 2017-08-31 PROCEDURE — D9220A PRA ANESTHESIA: Mod: ANES,,, | Performed by: ANESTHESIOLOGY

## 2017-08-31 PROCEDURE — 71000039 HC RECOVERY, EACH ADD'L HOUR: Performed by: SURGERY

## 2017-08-31 PROCEDURE — 63600175 PHARM REV CODE 636 W HCPCS: Performed by: SURGERY

## 2017-08-31 PROCEDURE — 36000708 HC OR TIME LEV III 1ST 15 MIN: Performed by: SURGERY

## 2017-08-31 RX ORDER — BUPIVACAINE HYDROCHLORIDE AND EPINEPHRINE 2.5; 5 MG/ML; UG/ML
INJECTION, SOLUTION EPIDURAL; INFILTRATION; INTRACAUDAL; PERINEURAL
Status: DISCONTINUED | OUTPATIENT
Start: 2017-08-31 | End: 2017-08-31 | Stop reason: HOSPADM

## 2017-08-31 RX ORDER — SODIUM CHLORIDE, SODIUM LACTATE, POTASSIUM CHLORIDE, CALCIUM CHLORIDE 600; 310; 30; 20 MG/100ML; MG/100ML; MG/100ML; MG/100ML
INJECTION, SOLUTION INTRAVENOUS CONTINUOUS
Status: DISCONTINUED | OUTPATIENT
Start: 2017-08-31 | End: 2017-08-31 | Stop reason: HOSPADM

## 2017-08-31 RX ORDER — ONDANSETRON 2 MG/ML
4 INJECTION INTRAMUSCULAR; INTRAVENOUS EVERY 8 HOURS PRN
Status: DISCONTINUED | OUTPATIENT
Start: 2017-08-31 | End: 2017-08-31 | Stop reason: HOSPADM

## 2017-08-31 RX ORDER — PROPOFOL 10 MG/ML
VIAL (ML) INTRAVENOUS
Status: DISCONTINUED | OUTPATIENT
Start: 2017-08-31 | End: 2017-08-31

## 2017-08-31 RX ORDER — ROCURONIUM BROMIDE 10 MG/ML
INJECTION, SOLUTION INTRAVENOUS
Status: DISCONTINUED | OUTPATIENT
Start: 2017-08-31 | End: 2017-08-31

## 2017-08-31 RX ORDER — ACETAMINOPHEN 10 MG/ML
INJECTION, SOLUTION INTRAVENOUS
Status: DISCONTINUED | OUTPATIENT
Start: 2017-08-31 | End: 2017-08-31

## 2017-08-31 RX ORDER — KETOROLAC TROMETHAMINE 30 MG/ML
INJECTION, SOLUTION INTRAMUSCULAR; INTRAVENOUS
Status: DISCONTINUED | OUTPATIENT
Start: 2017-08-31 | End: 2017-08-31

## 2017-08-31 RX ORDER — SUCCINYLCHOLINE CHLORIDE 20 MG/ML
INJECTION INTRAMUSCULAR; INTRAVENOUS
Status: DISCONTINUED | OUTPATIENT
Start: 2017-08-31 | End: 2017-08-31

## 2017-08-31 RX ORDER — ONDANSETRON 2 MG/ML
INJECTION INTRAMUSCULAR; INTRAVENOUS
Status: DISCONTINUED | OUTPATIENT
Start: 2017-08-31 | End: 2017-08-31

## 2017-08-31 RX ORDER — LIDOCAINE HCL/PF 100 MG/5ML
SYRINGE (ML) INTRAVENOUS
Status: DISCONTINUED | OUTPATIENT
Start: 2017-08-31 | End: 2017-08-31

## 2017-08-31 RX ORDER — HYDROCODONE BITARTRATE AND ACETAMINOPHEN 7.5; 325 MG/1; MG/1
1 TABLET ORAL EVERY 6 HOURS PRN
Status: DISCONTINUED | OUTPATIENT
Start: 2017-08-31 | End: 2017-08-31 | Stop reason: HOSPADM

## 2017-08-31 RX ORDER — ACETAMINOPHEN 10 MG/ML
1000 INJECTION, SOLUTION INTRAVENOUS ONCE
Status: DISCONTINUED | OUTPATIENT
Start: 2017-08-31 | End: 2017-08-31 | Stop reason: HOSPADM

## 2017-08-31 RX ORDER — CEFAZOLIN SODIUM 2 G/50ML
2 SOLUTION INTRAVENOUS
Status: DISCONTINUED | OUTPATIENT
Start: 2017-08-31 | End: 2017-08-31 | Stop reason: HOSPADM

## 2017-08-31 RX ORDER — KETOROLAC TROMETHAMINE 30 MG/ML
30 INJECTION, SOLUTION INTRAMUSCULAR; INTRAVENOUS ONCE
Status: DISCONTINUED | OUTPATIENT
Start: 2017-08-31 | End: 2017-08-31 | Stop reason: HOSPADM

## 2017-08-31 RX ORDER — HYDROCODONE BITARTRATE AND ACETAMINOPHEN 5; 325 MG/1; MG/1
1 TABLET ORAL EVERY 6 HOURS PRN
Qty: 30 TABLET | Refills: 0 | Status: SHIPPED | OUTPATIENT
Start: 2017-08-31 | End: 2017-09-10

## 2017-08-31 RX ORDER — MIDAZOLAM HYDROCHLORIDE 1 MG/ML
INJECTION, SOLUTION INTRAMUSCULAR; INTRAVENOUS
Status: DISCONTINUED | OUTPATIENT
Start: 2017-08-31 | End: 2017-08-31

## 2017-08-31 RX ORDER — PHENYLEPHRINE HYDROCHLORIDE 10 MG/ML
INJECTION INTRAVENOUS
Status: DISCONTINUED | OUTPATIENT
Start: 2017-08-31 | End: 2017-08-31

## 2017-08-31 RX ORDER — LIDOCAINE HYDROCHLORIDE 10 MG/ML
1 INJECTION, SOLUTION EPIDURAL; INFILTRATION; INTRACAUDAL; PERINEURAL ONCE
Status: DISCONTINUED | OUTPATIENT
Start: 2017-08-31 | End: 2017-08-31 | Stop reason: HOSPADM

## 2017-08-31 RX ORDER — NEOSTIGMINE METHYLSULFATE 1 MG/ML
INJECTION, SOLUTION INTRAVENOUS
Status: DISCONTINUED | OUTPATIENT
Start: 2017-08-31 | End: 2017-08-31

## 2017-08-31 RX ORDER — HYDROMORPHONE HYDROCHLORIDE 2 MG/ML
0.2 INJECTION, SOLUTION INTRAMUSCULAR; INTRAVENOUS; SUBCUTANEOUS EVERY 5 MIN PRN
Status: DISCONTINUED | OUTPATIENT
Start: 2017-08-31 | End: 2017-08-31 | Stop reason: HOSPADM

## 2017-08-31 RX ORDER — FENTANYL CITRATE 50 UG/ML
INJECTION, SOLUTION INTRAMUSCULAR; INTRAVENOUS
Status: DISCONTINUED | OUTPATIENT
Start: 2017-08-31 | End: 2017-08-31

## 2017-08-31 RX ORDER — GLYCOPYRROLATE 0.2 MG/ML
INJECTION INTRAMUSCULAR; INTRAVENOUS
Status: DISCONTINUED | OUTPATIENT
Start: 2017-08-31 | End: 2017-08-31

## 2017-08-31 RX ORDER — SODIUM CHLORIDE 0.9 % (FLUSH) 0.9 %
3 SYRINGE (ML) INJECTION
Status: DISCONTINUED | OUTPATIENT
Start: 2017-08-31 | End: 2017-08-31 | Stop reason: HOSPADM

## 2017-08-31 RX ADMIN — HYDROMORPHONE HYDROCHLORIDE 0.2 MG: 2 INJECTION INTRAMUSCULAR; INTRAVENOUS; SUBCUTANEOUS at 11:08

## 2017-08-31 RX ADMIN — HYDROCODONE BITARTRATE AND ACETAMINOPHEN 1 TABLET: 7.5; 325 TABLET ORAL at 12:08

## 2017-08-31 RX ADMIN — MIDAZOLAM HYDROCHLORIDE 2 MG: 1 INJECTION, SOLUTION INTRAMUSCULAR; INTRAVENOUS at 09:08

## 2017-08-31 RX ADMIN — ROCURONIUM BROMIDE 20 MG: 10 INJECTION, SOLUTION INTRAVENOUS at 09:08

## 2017-08-31 RX ADMIN — NEOSTIGMINE METHYLSULFATE 3 MG: 1 INJECTION INTRAVENOUS at 10:08

## 2017-08-31 RX ADMIN — ROCURONIUM BROMIDE 5 MG: 10 INJECTION, SOLUTION INTRAVENOUS at 09:08

## 2017-08-31 RX ADMIN — SODIUM CHLORIDE, SODIUM LACTATE, POTASSIUM CHLORIDE, AND CALCIUM CHLORIDE: .6; .31; .03; .02 INJECTION, SOLUTION INTRAVENOUS at 03:08

## 2017-08-31 RX ADMIN — LIDOCAINE HYDROCHLORIDE 100 MG: 20 INJECTION, SOLUTION INTRAVENOUS at 09:08

## 2017-08-31 RX ADMIN — FENTANYL CITRATE 50 MCG: 50 INJECTION INTRAMUSCULAR; INTRAVENOUS at 10:08

## 2017-08-31 RX ADMIN — GLYCOPYRROLATE 0.6 MG: 0.2 INJECTION, SOLUTION INTRAMUSCULAR; INTRAVENOUS at 10:08

## 2017-08-31 RX ADMIN — PROPOFOL 50 MG: 10 INJECTION, EMULSION INTRAVENOUS at 10:08

## 2017-08-31 RX ADMIN — FENTANYL CITRATE 50 MCG: 50 INJECTION INTRAMUSCULAR; INTRAVENOUS at 09:08

## 2017-08-31 RX ADMIN — CEFAZOLIN SODIUM 2 G: 2 SOLUTION INTRAVENOUS at 09:08

## 2017-08-31 RX ADMIN — PHENYLEPHRINE HYDROCHLORIDE 100 MCG: 10 INJECTION INTRAVENOUS at 09:08

## 2017-08-31 RX ADMIN — SUCCINYLCHOLINE CHLORIDE 140 MG: 20 INJECTION, SOLUTION INTRAMUSCULAR; INTRAVENOUS at 09:08

## 2017-08-31 RX ADMIN — ACETAMINOPHEN 1000 MG: 10 INJECTION, SOLUTION INTRAVENOUS at 09:08

## 2017-08-31 RX ADMIN — FENTANYL CITRATE 100 MCG: 50 INJECTION INTRAMUSCULAR; INTRAVENOUS at 09:08

## 2017-08-31 RX ADMIN — ROCURONIUM BROMIDE 30 MG: 10 INJECTION, SOLUTION INTRAVENOUS at 09:08

## 2017-08-31 RX ADMIN — ONDANSETRON 4 MG: 2 INJECTION, SOLUTION INTRAMUSCULAR; INTRAVENOUS at 09:08

## 2017-08-31 RX ADMIN — KETOROLAC TROMETHAMINE 30 MG: 30 INJECTION, SOLUTION INTRAMUSCULAR; INTRAVENOUS at 10:08

## 2017-08-31 RX ADMIN — SODIUM CHLORIDE, SODIUM LACTATE, POTASSIUM CHLORIDE, AND CALCIUM CHLORIDE: .6; .31; .03; .02 INJECTION, SOLUTION INTRAVENOUS at 07:08

## 2017-08-31 RX ADMIN — BUPIVACAINE HYDROCHLORIDE AND EPINEPHRINE BITARTRATE 50 ML: 2.5; .0091 INJECTION, SOLUTION EPIDURAL; INFILTRATION; INTRACAUDAL; PERINEURAL at 09:08

## 2017-08-31 RX ADMIN — PROPOFOL 200 MG: 10 INJECTION, EMULSION INTRAVENOUS at 09:08

## 2017-08-31 RX ADMIN — SODIUM CHLORIDE, SODIUM LACTATE, POTASSIUM CHLORIDE, AND CALCIUM CHLORIDE: .6; .31; .03; .02 INJECTION, SOLUTION INTRAVENOUS at 09:08

## 2017-08-31 NOTE — ANESTHESIA POSTPROCEDURE EVALUATION
"Anesthesia Post Evaluation    Patient: Layla Siegel    Procedure(s) Performed: Procedure(s) (LRB):  CHOLECYSTECTOMY-LAPAROSCOPIC (N/A)    Final Anesthesia Type: general  Patient location during evaluation: PACU  Patient participation: Yes- Able to Participate  Level of consciousness: awake and alert and oriented  Post-procedure vital signs: reviewed and stable  Pain management: adequate  Airway patency: patent  PONV status at discharge: No PONV  Anesthetic complications: no      Cardiovascular status: blood pressure returned to baseline, hemodynamically stable and hypertensive  Respiratory status: unassisted, spontaneous ventilation and room air  Hydration status: euvolemic  Follow-up not needed.        Visit Vitals  BP (!) 173/84 (BP Location: Left arm, Patient Position: Lying)   Pulse 90   Temp 36.9 °C (98.4 °F) (Oral)   Resp 15   Ht 6' 1" (1.854 m)   Wt 129.7 kg (286 lb)   SpO2 96%   BMI 37.73 kg/m²       Pain/Mike Score: Pain Assessment Performed: Yes (8/31/2017 10:59 AM)  Presence of Pain: non-verbal indicators absent (8/31/2017 10:59 AM)  Pain Rating Prior to Med Admin: 6 (8/31/2017 11:30 AM)  Mike Score: 8 (8/31/2017 10:59 AM)      "

## 2017-08-31 NOTE — PLAN OF CARE
Problem: Surgery Nonspecified (Adult)  Intervention: Monitor/Manage Pain   08/31/17 1222   Safety Interventions   Medication Review/Management medications reviewed   Pain/Comfort/Sleep Interventions   Pain Management Interventions medication   Patient medicated with prn medication.  Rates pain of a 4 on 0/10 prs.  AAO x 3.  VSS.

## 2017-08-31 NOTE — ANESTHESIA PREPROCEDURE EVALUATION
08/31/2017  Layla Siegel is a 41 y.o., male.    Pre-op Assessment    I have reviewed the Patient Summary Reports.      I have reviewed the Medications.     Review of Systems  Anesthesia Hx:  No problems with previous Anesthesia  Neg history of prior surgery. Denies Family Hx of Anesthesia complications.    Social:  Non-Smoker    Hematology/Oncology:  Hematology Normal        Cardiovascular:   Hypertension, well controlled    Pulmonary:  Pulmonary Normal    Renal/:  Renal/ Normal     Neurological:  Neurology Normal    Endocrine:  Endocrine Normal        Physical Exam  General:  Obesity    Airway/Jaw/Neck:  Airway Findings: Mouth Opening: Normal Tongue: Large  General Airway Assessment: Adult, Good  Mallampati: II  Improves to I with phonation.  TM Distance: Normal, at least 6 cm       Chest/Lungs:  Chest/Lungs Findings: Clear to auscultation, Normal Respiratory Rate     Heart/Vascular:  Heart Findings: Rate: Normal  Rhythm: Regular Rhythm        Mental Status:  Mental Status Findings:  Alert and Oriented, Cooperative         Anesthesia Plan  Type of Anesthesia, risks & benefits discussed:  Anesthesia Type:  general  Patient's Preference:   Intra-op Monitoring Plan:   Intra-op Monitoring Plan Comments:   Post Op Pain Control Plan:   Post Op Pain Control Plan Comments:   Induction:   IV  Beta Blocker:  Patient is not currently on a Beta-Blocker (No further documentation required).       Informed Consent: Patient understands risks and agrees with Anesthesia plan.  Questions answered. Anesthesia consent signed with patient.  ASA Score: 2     Day of Surgery Review of History & Physical: I have interviewed and examined the patient. I have reviewed the patient's H&P dated:  There are no significant changes.          Ready For Surgery From Anesthesia Perspective.

## 2017-08-31 NOTE — INTERVAL H&P NOTE
The patient has been examined and the H&P has been reviewed:    I concur with the findings and no changes have occurred since H&P was written.    Surgery risks, benefits and alternative options discussed and understood by patient/family.          Active Hospital Problems    Diagnosis  POA    Acute and chronic cholecystitis [K81.2]  Yes      Resolved Hospital Problems    Diagnosis Date Resolved POA   No resolved problems to display.

## 2017-08-31 NOTE — DISCHARGE INSTRUCTIONS
DIET: You may resume your home diet. If nausea is present, increase your diet gradually with fluids and bland foods    ACTIVITY LEVEL: If you have received sedation or an anesthetic, you may feel sleepy for several hours. Rest until you are more awake. Activity as tolerated except no lifting greater than 20lbs.    Diet of choice.    Dressing:    Outer dressings off in 48 hours.  Ok to shower in 48 hours.  No baths or submerging wound under water.    Medications: Pain medication should be taken only if needed and as directed. If antibiotics are prescribed, the medication should be taken until completed. You will be given an updated list of you medications.    No driving, alcoholic beverages or signing legal documents for next 24 hours or while taking pain medication.       CALL THE DOCTOR:    For any obvious bleeding (some dried blood over the incision is normal).      Redness, swelling, foul smell around incision or fever over 101.   Shortness of breath, Coughing up Bloody sputum, Pains or Swelling in your Calves .   Persistent pain or nausea not relieved by medication.    If any unusual problems or difficulties occur contact your doctor. If you cannot contact your doctor but feel your signs and symptoms warrant a physicians attention return to the emergency room.    Pain medication given at 1245pm.    Fall Prevention  Millions of people fall every year and injure themselves. You may have had anesthesia or sedation which may increase your risk of falling. You may have health issues that put you at an increased risk of falling.     Here are ways to reduce your risk of falling.  ·   · Make your home safe by keeping walkways clear of objects you may trip over.  · Use non-slip pads under rugs. Do not use area rugs or small throw rugs.  · Use non-slip mats in bathtubs and showers.  · Install handrails and lights on staircases.  · Do not walk in poorly lit areas.  · Do not stand on chairs or wobbly ladders.  · Use  caution when reaching overhead or looking upward. This position can cause a loss of balance.  · Be sure your shoes fit properly, have non-slip bottoms and are in good condition.   · Wear shoes both inside and out. Avoid going barefoot or wearing slippers.  · Be cautious when going up and down stairs, curbs, and when walking on uneven sidewalks.  · If your balance is poor, consider using a cane or walker.  · If your fall was related to alcohol use, stop or limit alcohol intake.   · If your fall was related to use of sleeping medicines, talk to your doctor about this. You may need to reduce your dosage at bedtime if you awaken during the night to go to the bathroom.    · To reduce the need for nighttime bathroom trips:  ¨ Avoid drinking fluids for several hours before going to bed  ¨ Empty your bladder before going to bed  ¨ Men can keep a urinal at the bedside  · Stay as active as you can. Balance, flexibility, strength, and endurance all come from exercise. They all play a role in preventing falls. Ask your healthcare provider which types of activity are right for you.  · Get your vision checked on a regular basis.  · If you have pets, know where they are before you stand up or walk so you don't trip over them.  · Use night lights.

## 2017-08-31 NOTE — TRANSFER OF CARE
"Anesthesia Transfer of Care Note    Patient: Layla Siegel    Procedure(s) Performed: Procedure(s) (LRB):  CHOLECYSTECTOMY-LAPAROSCOPIC (N/A)    Patient location: PACU    Anesthesia Type: general    Transport from OR: Transported from OR on room air with adequate spontaneous ventilation    Post pain: adequate analgesia    Post assessment: no apparent anesthetic complications and tolerated procedure well    Post vital signs: stable    Level of consciousness: awake, alert and oriented    Nausea/Vomiting: no nausea/vomiting    Complications: none    Transfer of care protocol was followed      Last vitals:   Visit Vitals  BP (!) 173/84 (BP Location: Left arm, Patient Position: Lying)   Pulse 90   Temp 36.9 °C (98.4 °F) (Oral)   Resp 15   Ht 6' 1" (1.854 m)   Wt 129.7 kg (286 lb)   SpO2 96%   BMI 37.73 kg/m²     "

## 2017-08-31 NOTE — BRIEF OP NOTE
[facility]  Brief Operative Note     SUMMARY     Surgery Date: 8/31/2017     Surgeon(s) and Role:     * Bonilla Dennis MD - Primary     * Dipak Rodas III, MD - Assisting        Pre-op Diagnosis:  Acute and chronic cholecystitis [K81.2]    Post-op Diagnosis:  Post-Op Diagnosis Codes:     * Acute and chronic cholecystitis [K81.2]    Procedure(s) (LRB):  CHOLECYSTECTOMY-LAPAROSCOPIC (N/A)    Anesthesia: General    Description of the findings of the procedure: see dictation    Findings/Key Components: see dictation    Estimated Blood Loss: 50 ml         Specimens:   Specimen (12h ago through future)    Start     Ordered    08/31/17 1014  Specimen to Pathology - Surgery  Once     Comments:  Gallbladder      08/31/17 1028          Discharge Note    SUMMARY     Admit Date: 8/31/2017    Discharge Date and Time:  08/31/2017 10:59 AM    Hospital Course (synopsis of major diagnoses, care, treatment, and services provided during the course of the hospital stay): Patient was admitted for procedure.  Underwent planned procedure without apparent complication.  After meeting recovery criteria they were cleared for discharge       Final Diagnosis: Post-Op Diagnosis Codes:     * Acute and chronic cholecystitis [K81.2]    Disposition: Home or Self Care    Follow Up/Patient Instructions: Activity as tolerated except no lifting greater than 20lbs.  Diet of choice.  Outer dressings off in 48 hours.  Ok to shower in 48 hours.  No baths or submerging wound under water.      Medications:  Reconciled Home Medications:   Current Discharge Medication List      CONTINUE these medications which have CHANGED    Details   hydrocodone-acetaminophen 5-325mg (NORCO) 5-325 mg per tablet Take 1 tablet by mouth every 6 (six) hours as needed for Pain.  Qty: 30 tablet, Refills: 0         CONTINUE these medications which have NOT CHANGED    Details   amlodipine (NORVASC) 5 MG tablet Take 1 tablet (5 mg total) by mouth once  daily.  Qty: 90 tablet, Refills: 3      losartan-hydrochlorothiazide 100-25 mg (HYZAAR) 100-25 mg per tablet Take 1 tablet by mouth once daily.  Qty: 90 tablet, Refills: 3      ondansetron (ZOFRAN-ODT) 8 MG TbDL Take 1 tablet (8 mg total) by mouth every 8 (eight) hours as needed (Nausea).  Qty: 20 tablet, Refills: 0      ranitidine (ZANTAC) 150 MG capsule Take 1 capsule (150 mg total) by mouth nightly.  Qty: 15 capsule, Refills: 0           No discharge procedures on file.  Follow-up Information     Bonilla Dennis MD. Schedule an appointment as soon as possible for a visit in 2 weeks.    Specialty:  General Surgery  Contact information:  [address] [place] [address]  [place] [address] [phone]

## 2017-08-31 NOTE — PLAN OF CARE
Problem: Surgery Nonspecified (Adult)  Intervention: Prevent/Manage Impaired Postoperative Elimination   08/31/17 1225   Genitourinary () Interventions   Urinary Elimination Promotion toileting offered

## 2017-09-01 NOTE — OP NOTE
DATE OF PROCEDURE:  08/31/2017    PREOPERATIVE DIAGNOSIS:  Acute cholecystitis.    POSTOPERATIVE DIAGNOSIS:  Chronic and severe acute cholecystitis.    PROCEDURE PERFORMED:  Laparoscopic cholecystectomy.    SURGEON:  Bonilla Dennis M.D.    FIRST ASSISTANT:  Dipak Lewis M.D.    ANESTHESIA:  General.    COMPLICATIONS:  None.    ESTIMATED BLOOD LOSS:  Less than 50 mL.    SPECIMENS:  Gallbladder and contents.    INDICATION FOR PROCEDURE:  The patient is a pleasant 41-year-old male that was   seen 2 days previous in the clinic with clinical findings worrisome for   resolving acute cholecystitis.  Due to the patient's symptoms, he was scheduled   in an urgent manner for an elective cholecystectomy.  Informed consent was   obtained.    DESCRIPTION OF PROCEDURE:  The patient was taken to the Operating Room, placed   in supine position on the operative table.  General endotracheal anesthesia was   obtained.  The patient's abdomen was then prepped and draped in the usual   sterile manner.  A curvilinear infraumbilical skin incision was made and   electrocautery was used deep into the subcutaneous tissue down to the level of   the fascia, which was incised in its midline.  Two #0 stay Vicryl sutures were   placed.  Марина cannula was inserted and pneumoperitoneum was established.    Laparoscope was introduced and the abdomen was inspected.  There was no evidence   of iatrogenic injury on initial Марина trocar placement.  No gross pathology   appreciated on general surveillance of the abdomen.  Liver was noted to be   enlarged, likely consistent with fatty infiltration.  The omentum was noted to   be adhesed over the right lobe of the liver.  Three additional 5-mm ports were   placed under direct visualization.  Then begin trying to reduce the omentum over   the right lobe of the liver.  It became rather obvious that this was   significant inflammatory response from what appeared to be severe acute   cholecystitis with  likely a chronic component.  I was able to clear off a   portion of the gallbladder and then attempted to grasp and elevate it; however,   it was markedly distended  and therefore, needle decompression was performed   returning dark thick viscous bile.  This then allowed the gallbladder be grasped   and partially elevated above the liver.  Using slow safe dissection I was able   to dissect the significant omental adhesions along the entire body of the   gallbladder using mostly blunt dissection.  There were also some adhesions   laterally, which were taken down with a combination of electrocautery and blunt   dissection.  This was slowly continued until eventually I was able to identify   the area of the infundibulum of the gallbladder.  This was grasped and retracted   towards the right upper quadrant and allowed further dissection.  Again, there   were significant chronic inflammatory and acute inflammatory changes throughout   the entire field.  Nonetheless, I was able to skeletonize the cystic duct and   the cystic artery with slow persistent meticulous dissection.  The critical view   was widely obtained.  The gallbladder did have some gangrenous changes about   just superior to the area of the infundibulum and a small tear was made in this   area by the gallbladder spilling some small stones, which were immediately   suctioned free.  The area was grasped with locking grasper and kept closed as   the dissection continued.  Once critical view was widely appreciated, the cystic   duct and cystic artery were then triply clipped and divided.  Electrocautery   was then used to dissect the gallbladder from the liver bed, which was densely   adhesed as well.  Gallbladder was placed in an EndoCatch bag and eventually   removed through the infraumbilical fascial defect.  Right upper quadrant was   copiously irrigated gallbladder fossa inspected and electrocautery used to   obtain hemostasis on a few minor bleeding  points.  The previously placed clips   on the cystic duct and cystic artery were intact without any evidence of   bleeding or bile leakage.  The right upper quadrant was then further irrigated   and suctioned free of all fluid until the irrigant ran clear.  Everything   appeared to be completely in order, again with excellent hemostasis observed and   no evidence of bile leak.  Pneumoperitoneum was then allowed to collapse and   all trocars were then removed.  I was initially unable to retrieve the   gallbladder through the infraumbilical fascial defect secondary to its size and   stone burden.  Therefore, the fascial defect was extended both superiorly and   inferiorly approximately 1 cm.  The gallbladder was then able to be delivered   and passed off for pathology.  Using a series of interrupted 0 Vicryl sutures,   the infraumbilical fascial defect was closed.  Wound was then irrigated, all   ports sites injected with local anesthetic and subsequently closed with 4-0   Monocryl in subcuticular fashion.  Wounds were dressed with Steri-Strips and   sterile dressings.  The patient was then extubated and transferred to Recovery   having tolerated the procedure without any apparent complications.  All counts   reported to be correct prior to cessation of the case.      CODY/MALIK  dd: 08/31/2017 15:02:58 (CDT)  td: 08/31/2017 20:04:01 (CDT)  Doc ID   #[id number]  Job ID #[id number]    CC:

## 2017-09-01 NOTE — TELEPHONE ENCOUNTER
"    Reason for Disposition   Eyelid swelling from suspected mild irritant (all triage questions negative)     Layla had surgical procedure today, lap claus.  He is calling now with slightly swollen lower eyelids, and they itch.  He is also reporting a "scratchy throat".  He was intubated, and we discussed probability that something was placed on his eyelids to keep them closed and to keep them from drying during surgery.  Recommended gently washing the area, rinsing, and placing cool wet cloth over his eyes.  Home care advice provided;  He will call back for any worsening symptoms.  Message to Bonilla Dennis MD, surgeon.  Please contact caller directly with any additional care advice.   Other post-op symptom or question (all triage questions negative)     Scratchy throat (was intubated for lap claus today).  No difficulty breathing or swallowing, no shortness of breath.  Home care advice given, to include recommendation to hydrate well, suck on peppermint, and take deep breaths to exercise his lungs.  He will call back for any additional concerns or worsening throat symptoms. Message to Bonilla Dennis MD, surgeon.    Answer Assessment - Initial Assessment Questions  1. ONSET: "When did the swelling start?" (e.g., minutes, hours, days)      Lower left eyelid is swollen, and the lower right eyelid is swollen only on the outside half of the lower eye lid.    2. LOCATION: "What part of the eyelids is swollen?"      Lower eyelids; see above.    3. SEVERITY: "How swollen is it?"      His eyes are open.     4. ITCHING: "Is there any itching?" If so, ask: "How much?"   (Scale 1-10; mild, moderate or severe)      Itching 5-6 of 10.    5. PAIN: "Is the swelling painful to touch?" If so, ask: "How painful is it?"   (Scale 1-10; mild, moderate or severe)      No.    6. FEVER: "Do you have a fever?" If so, ask: "What is it, how was it measured, and when did it start?"       No fever.    7. CAUSE: "What do you think is causing " "the swelling?"      I don't know.    8. RECURRENT SYMPTOM: "Have you had eyelid swelling before?" If so, ask: "When was the last time?" "What happened that time?"      Never had this happen before.    9. OTHER SYMPTOMS: "Do you have any other symptoms?" (e.g., blurred vision, eye discharge, rash, runny nose)      No to all.    10. PREGNANCY: "Is there any chance you are pregnant?" "When was your last menstrual period?"        No.    Protocols used: ST EYE - SWELLING-A-, ST POST-OP SYMPTOMS AND [id number]      "

## 2017-09-11 ENCOUNTER — OFFICE VISIT (OUTPATIENT)
Dept: FAMILY MEDICINE | Facility: CLINIC | Age: 42
End: 2017-09-11
Payer: COMMERCIAL

## 2017-09-11 VITALS
TEMPERATURE: 98 F | RESPIRATION RATE: 20 BRPM | HEART RATE: 68 BPM | OXYGEN SATURATION: 99 % | DIASTOLIC BLOOD PRESSURE: 72 MMHG | WEIGHT: 282 LBS | HEIGHT: 73 IN | BODY MASS INDEX: 37.37 KG/M2 | SYSTOLIC BLOOD PRESSURE: 116 MMHG

## 2017-09-11 DIAGNOSIS — G57.91 NEUROPATHY OF RIGHT LOWER EXTREMITY: Primary | ICD-10-CM

## 2017-09-11 PROBLEM — K81.2 ACUTE AND CHRONIC CHOLECYSTITIS: Status: RESOLVED | Noted: 2017-08-31 | Resolved: 2017-09-11

## 2017-09-11 PROBLEM — R73.03 PREDIABETES: Status: ACTIVE | Noted: 2017-09-11

## 2017-09-11 PROCEDURE — 99213 OFFICE O/P EST LOW 20 MIN: CPT | Mod: S$GLB,,, | Performed by: FAMILY MEDICINE

## 2017-09-11 PROCEDURE — 3078F DIAST BP <80 MM HG: CPT | Mod: S$GLB,,, | Performed by: FAMILY MEDICINE

## 2017-09-11 PROCEDURE — 3008F BODY MASS INDEX DOCD: CPT | Mod: S$GLB,,, | Performed by: FAMILY MEDICINE

## 2017-09-11 PROCEDURE — 3074F SYST BP LT 130 MM HG: CPT | Mod: S$GLB,,, | Performed by: FAMILY MEDICINE

## 2017-09-11 PROCEDURE — 99999 PR PBB SHADOW E&M-EST. PATIENT-LVL III: CPT | Mod: PBBFAC,,, | Performed by: FAMILY MEDICINE

## 2017-09-11 NOTE — PROGRESS NOTES
Routine Office Visit    Patient Name: Layla Siegel    : 1975  MRN: [id number]    Subjective:  Layla is a 41 y.o. male who presents today for:    1.   The tingling in the R leg - +shocking like pains.  It has since resolved, it lasted for about a week.  Started after his cholecystectomy that was done emergently, laparoscopically.    He denies back pain, spreading of the tingling.  He has had some localized numbness feeling there as well.  He started taking garlic and honey (crushed garlic mixed with honey and eating it) on the advice of his mom for anti-inflammation.  He thinks that might have worked a little.  He hasn't tried cold or heat packs.  He wanted to make sure it wasn't a surgical complication.  He has f/u with the surgeon on Thursday.     Past Medical History  Past Medical History:   Diagnosis Date    High cholesterol     Hypertension        Past Surgical History  Past Surgical History:   Procedure Laterality Date    COLONOSCOPY N/A 12/15/2015    Procedure: COLONOSCOPY;  Surgeon: Horace Bella MD;  Location: [facility];  Service: Endoscopy;  Laterality: N/A;       Family History  Family History   Problem Relation Age of Onset    Hypertension Mother     Heart disease Maternal Grandfather     Heart disease Paternal Grandfather        Social History  Social History     Social History    Marital status:      Spouse name: N/A    Number of children: N/A    Years of education: N/A     Occupational History    Not on file.     Social History Main Topics    Smoking status: Never Smoker    Smokeless tobacco: Never Used    Alcohol use Yes      Comment: occasional    Drug use: No    Sexual activity: Yes     Partners: Female     Birth control/ protection: None     Other Topics Concern    Not on file     Social History Narrative    No narrative on file       Current Medications  Current Outpatient Prescriptions on File Prior to Visit   Medication Sig Dispense Refill    amlodipine  "(NORVASC) 5 MG tablet Take 1 tablet (5 mg total) by mouth once daily. 90 tablet 3    [] hydrocodone-acetaminophen 5-325mg (NORCO) 5-325 mg per tablet Take 1 tablet by mouth every 6 (six) hours as needed for Pain. 30 tablet 0    losartan-hydrochlorothiazide 100-25 mg (HYZAAR) 100-25 mg per tablet Take 1 tablet by mouth once daily. 90 tablet 3    ondansetron (ZOFRAN-ODT) 8 MG TbDL Take 1 tablet (8 mg total) by mouth every 8 (eight) hours as needed (Nausea). 20 tablet 0    ranitidine (ZANTAC) 150 MG capsule Take 1 capsule (150 mg total) by mouth nightly. 15 capsule 0     No current facility-administered medications on file prior to visit.        Allergies   Review of patient's allergies indicates:  No Known Allergies    Review of Systems (Pertinent positives)  Constititutional: Weight loss, excess fatigue, chills, fever, night sweats, weakness, loss of appetite  Skin: Rash, itching, lesions, color changes  Ears: Earache, ringing in ears, discharge, hearing loss, hearing aid, popping, infection Nose: stuffy nose, mouth breathing, post-nasal drip,   Throat: hoarseness, bad breath, swelling, sore throat  Teeth: toothache, caries, dentures, bleeding gums  Lungs: Cough, sputum, wheeze, frequent URI, SOA, Asthma  Heart: Chest pain, angina, palpitations, extra heart beats  Stomach/Intestine: Heartburn, Nausea, vomiting, diarrhea, indigestion, bloating, constipation    /72 (BP Location: Right arm, Patient Position: Sitting, BP Method: Large (Manual))   Pulse 68   Temp 97.9 °F (36.6 °C) (Oral)   Resp 20   Ht 6' 1" (1.854 m)   Wt 127.9 kg (282 lb)   SpO2 99%   BMI 37.21 kg/m²     GENERAL APPEARANCE: in no apparent distress and well developed and well nourished  HEENT: PERRLA, EOMI, Sclera clear, anicteric, Oropharynx clear, no lesions, Neck supple with midline trachea  RESPIRATORY: appears well, vitals normal, no respiratory distress, acyanotic, normal RR, chest clear, no wheezing, crepitations, rhonchi, " "normal symmetric air entry  HEART: regular rate and rhythm, S1, S2 normal, no murmur, click, rub or gallop.    ABDOMEN: abdomen is soft without tenderness, no masses, no hernias, no organomegaly, no rebound, no guarding. +well healing surgical wounds with evidence of hyperpigmented scarring and skin tear and healing tissue on each side of laparoscopy sites.  NEUROLOGIC: normal without focal findings, CN II-XII are intact.    Extremities: warm/well perfused.  No abnormal hair patterns.  No clubbing, cyanosis or edema.  no muscular tenderness noted, full range of motion without pain.  no joint tenderness, deformity or swelling noted.   PSYCH: Alert, oriented x 3, thought content appropriate, speech normal, pleasant and cooperative, good eye contact, well groomed, recall good, concentration/judgement good and apparently average intelligence.    Assessment/Plan:  Layla Siegel is a 41 y.o. male who presents today for :    Layla was seen today for tingling.    Diagnoses and all orders for this visit:    Neuropathy of right lower extremity  -  Resolved.  Likely not related to surgery.  Maybe to positional changes during ER visit but appears to be benign, temporary, and now resolved.  -  Discussed improving water intake - only 1-2 bottles 16-20 oz per day.    -  "I used to eat [employer] every day."  Now none - due to surgery.  He's interested in losing weight.  "I heard there's a pill for that."  Discussed healthier eating habits, and improving food choices.      F/u PRN     "

## 2018-01-08 ENCOUNTER — TELEPHONE (OUTPATIENT)
Dept: FAMILY MEDICINE | Facility: CLINIC | Age: 43
End: 2018-01-08

## 2018-01-08 ENCOUNTER — OFFICE VISIT (OUTPATIENT)
Dept: FAMILY MEDICINE | Facility: CLINIC | Age: 43
End: 2018-01-08
Payer: COMMERCIAL

## 2018-01-08 VITALS
TEMPERATURE: 98 F | DIASTOLIC BLOOD PRESSURE: 90 MMHG | HEIGHT: 73 IN | SYSTOLIC BLOOD PRESSURE: 142 MMHG | BODY MASS INDEX: 37.84 KG/M2 | WEIGHT: 285.5 LBS | HEART RATE: 72 BPM | RESPIRATION RATE: 18 BRPM | OXYGEN SATURATION: 97 %

## 2018-01-08 DIAGNOSIS — J01.90 ACUTE SINUSITIS, RECURRENCE NOT SPECIFIED, UNSPECIFIED LOCATION: ICD-10-CM

## 2018-01-08 DIAGNOSIS — E78.5 HYPERLIPIDEMIA LDL GOAL <130: ICD-10-CM

## 2018-01-08 DIAGNOSIS — I10 ESSENTIAL HYPERTENSION: Primary | ICD-10-CM

## 2018-01-08 DIAGNOSIS — D64.9 NORMOCYTIC ANEMIA: ICD-10-CM

## 2018-01-08 DIAGNOSIS — R73.01 IMPAIRED FASTING BLOOD SUGAR: ICD-10-CM

## 2018-01-08 DIAGNOSIS — R53.83 FATIGUE, UNSPECIFIED TYPE: ICD-10-CM

## 2018-01-08 DIAGNOSIS — S39.012A BACK STRAIN, INITIAL ENCOUNTER: ICD-10-CM

## 2018-01-08 PROCEDURE — 99999 PR PBB SHADOW E&M-EST. PATIENT-LVL IV: CPT | Mod: PBBFAC,,, | Performed by: NURSE PRACTITIONER

## 2018-01-08 PROCEDURE — 99214 OFFICE O/P EST MOD 30 MIN: CPT | Mod: S$GLB,,, | Performed by: NURSE PRACTITIONER

## 2018-01-08 RX ORDER — TIZANIDINE 4 MG/1
4 TABLET ORAL NIGHTLY PRN
Qty: 30 TABLET | Refills: 0 | Status: SHIPPED | OUTPATIENT
Start: 2018-01-08 | End: 2018-02-07

## 2018-01-08 RX ORDER — IBUPROFEN 800 MG/1
800 TABLET ORAL 3 TIMES DAILY PRN
Qty: 40 TABLET | Refills: 0 | Status: SHIPPED | OUTPATIENT
Start: 2018-01-08 | End: 2018-01-23 | Stop reason: ALTCHOICE

## 2018-01-08 RX ORDER — LEVOCETIRIZINE DIHYDROCHLORIDE 5 MG/1
5 TABLET, FILM COATED ORAL NIGHTLY
Qty: 30 TABLET | Refills: 0 | Status: SHIPPED | OUTPATIENT
Start: 2018-01-08 | End: 2018-03-22 | Stop reason: SDUPTHER

## 2018-01-08 RX ORDER — FLUTICASONE PROPIONATE 50 MCG
2 SPRAY, SUSPENSION (ML) NASAL DAILY
Qty: 1 BOTTLE | Refills: 2 | Status: SHIPPED | OUTPATIENT
Start: 2018-01-08 | End: 2018-03-22 | Stop reason: SDUPTHER

## 2018-01-08 RX ORDER — LOSARTAN POTASSIUM AND HYDROCHLOROTHIAZIDE 25; 100 MG/1; MG/1
1 TABLET ORAL DAILY
Qty: 90 TABLET | Refills: 1 | Status: SHIPPED | OUTPATIENT
Start: 2018-01-08 | End: 2018-01-23 | Stop reason: SDUPTHER

## 2018-01-08 NOTE — PROGRESS NOTES
Patient Name: Layla Siegel    : 1975  MRN: [id number]    Subjective:  Layla is a 42 y.o. male who presents today for     1. Refill of his medications until he can follow up to establish new pcp as his previous left practice. Reports 3 days sinus congestion and 2 days low back pain and chronic fatigue.     Past Medical History  Past Medical History:   Diagnosis Date    High cholesterol     Hypertension        Past Surgical History  Past Surgical History:   Procedure Laterality Date    COLONOSCOPY N/A 12/15/2015    Procedure: COLONOSCOPY;  Surgeon: Horace Bella MD;  Location: [facility];  Service: Endoscopy;  Laterality: N/A;       Family History  Family History   Problem Relation Age of Onset    Hypertension Mother     Heart disease Maternal Grandfather     Heart disease Paternal Grandfather        Social History  Social History     Social History    Marital status:      Spouse name: N/A    Number of children: N/A    Years of education: N/A     Occupational History    Not on file.     Social History Main Topics    Smoking status: Never Smoker    Smokeless tobacco: Never Used    Alcohol use Yes      Comment: occasional    Drug use: No    Sexual activity: Yes     Partners: Female     Birth control/ protection: None     Other Topics Concern    Not on file     Social History Narrative    No narrative on file       Allergies  Review of patient's allergies indicates:  No Known Allergies -reviewed and updated      Medications  Reviewed and updated.   Current Outpatient Prescriptions   Medication Sig Dispense Refill    amlodipine (NORVASC) 5 MG tablet Take 1 tablet (5 mg total) by mouth once daily. 90 tablet 3    losartan-hydrochlorothiazide 100-25 mg (HYZAAR) 100-25 mg per tablet Take 1 tablet by mouth once daily. 90 tablet 1    fluticasone (FLONASE) 50 mcg/actuation nasal spray 2 sprays (100 mcg total) by Each Nare route once daily. 1 Bottle 2    ibuprofen (ADVIL,MOTRIN) 800 MG tablet  "Take 1 tablet (800 mg total) by mouth 3 (three) times daily as needed for Pain. 40 tablet 0    levocetirizine (XYZAL) 5 MG tablet Take 1 tablet (5 mg total) by mouth every evening. 30 tablet 0    tiZANidine (ZANAFLEX) 4 MG tablet Take 1 tablet (4 mg total) by mouth nightly as needed. For back pain 30 tablet 0     No current facility-administered medications for this visit.          Review of Systems   Constitutional: Negative for chills and fever.   HENT: Positive for congestion, ear pain (bilateral) and sinus pain.    Respiratory: Negative for cough.    Cardiovascular: Negative for chest pain.   Gastrointestinal: Positive for diarrhea (following gall bladder sx).   Genitourinary: Negative.    Musculoskeletal: Positive for back pain (x 2 days).   Neurological: Positive for headaches (nightly).         Physical Exam  BP (!) 142/90 (BP Location: Right arm, Patient Position: Sitting, BP Method: Large (Manual))   Pulse 72   Temp 98 °F (36.7 °C) (Oral)   Resp 18   Ht 6' 1" (1.854 m)   Wt 129.5 kg (285 lb 7.9 oz)   SpO2 97%   BMI 37.67 kg/m²   Physical Exam   Constitutional: He appears well-developed. No distress.   HENT:   Head: Normocephalic.   Right Ear: A middle ear effusion is present.   Left Ear: A middle ear effusion is present.   Nose: Mucosal edema present.   Mouth/Throat: No posterior oropharyngeal edema or posterior oropharyngeal erythema.   Cardiovascular: Normal rate, regular rhythm and normal heart sounds.    Pulmonary/Chest: Effort normal and breath sounds normal.   Musculoskeletal:        Lumbar back: He exhibits tenderness and pain. He exhibits normal range of motion, no bony tenderness, no swelling and no edema.   Skin: He is not diaphoretic.         Assessment/Plan:  Layla Siegel is a 42 y.o. male who presents today for :    Essential hypertension  Out of medications, refill, schedule to f/u with physician to recheck bp  -     losartan-hydrochlorothiazide 100-25 mg (HYZAAR) 100-25 mg per " tablet; Take 1 tablet by mouth once daily.  Dispense: 90 tablet; Refill: 1    Hyperlipidemia LDL goal <130  Due for labs  -     Lipid panel; Future; Expected date: 01/08/2018  -     Comprehensive metabolic panel; Future; Expected date: 01/08/2018    Fatigue, unspecified type  Will check labs  -     CBC auto differential; Future; Expected date: 01/08/2018  -     TSH; Future; Expected date: 01/08/2018    Impaired fasting blood sugar  Previous fasting elevated  -     Hemoglobin A1c; Future; Expected date: 01/08/2018    Normocytic anemia  Reviewed labs, Previous anemia  -     Hemoglobin Electrophoresis,Hgb A2 Esteban.; Future; Expected date: 01/08/2018    Back strain, initial encounter  Advise hydration, heating pad  -     tiZANidine (ZANAFLEX) 4 MG tablet; Take 1 tablet (4 mg total) by mouth nightly as needed. For back pain  Dispense: 30 tablet; Refill: 0  -     ibuprofen (ADVIL,MOTRIN) 800 MG tablet; Take 1 tablet (800 mg total) by mouth 3 (three) times daily as needed for Pain.  Dispense: 40 tablet; Refill: 0    Acute sinusitis, recurrence not specified, unspecified location  No indication infection, trial xyzal and flonase  -     levocetirizine (XYZAL) 5 MG tablet; Take 1 tablet (5 mg total) by mouth every evening.  Dispense: 30 tablet; Refill: 0  -     fluticasone (FLONASE) 50 mcg/actuation nasal spray; 2 sprays (100 mcg total) by Each Nare route once daily.  Dispense: 1 Bottle; Refill: 2        Return if symptoms worsen or fail to improve.

## 2018-01-09 NOTE — PATIENT INSTRUCTIONS
Sinus Headache    The sinuses are air-filled spaces within the bones of the face. They connect to the inside of the nose. Sinusitis is an inflammation of the tissue lining the sinus cavity. Sinus inflammation can occur during a cold or hay fever (allergies to pollens and other particles in the air) and cause symptoms of sinus congestion and fullness and perhaps a low-grade fever. An infection is usually present when there is also facial pain or headache and green or yellow drainage from the nose or into the back of the throat (postnasal drip). Antibiotics are often prescribed to treat this condition.  Sinus headache may cause pain in different places, depending on which sinuses are infected. There may be pain in the temples, forehead, top of the head, behind or around the eye, across the cheekbone, or into the upper teeth.  You may find that changing your position, sitting upright or lying down, will bring some relief.  Home care  The following guidelines will help you care for yourself at home:  · Drink plenty of water, hot tea, and other liquids to stay well hydrated. This thins the mucus and helps your sinuses drain.  · Apply heat to the painful areas of the face. Use a towel soaked in hot water. Or  the shower with the hot spray on your face. This is a good way to inhale warm water vapor and get heat on your face at the same time. Cover your mouth and nose with your hands so you can still breathe as you do this.  · Use a cool mist vaporizer at night. Suck on peppermint, menthol, or eucalyptus hard candies during the day.  · An expectorant containing guaifenesin helps thin the mucus. It also helps your sinuses drain.  · You may use over-the-counter decongestants unless a similar medicine was prescribed. Nasal sprays or drops work the fastest. Use one that contains phenylephrine or oxymetazoline. First blow your nose gently to remove mucus. Then apply the spray or drops. Don't use decongestant nasal  sprays or drops more often than the label says or for more than 3 days. This can make symptoms worse. Nasal sprays or drops prescribed by your doctor typically do not have these limits. Check with your doctor or pharmacist. You may also use oral tablets containing pseudoephedrine. Side effects from oral decongestants tend to be worse than with nasal sprays or drops, and may keep you from using them. Many sinus remedies combine ingredients, which may increase side effects. Also, if you are taking a combination medicine with another medicine, be sure you are not taking a double dose of anything by mistake. Read the labels or ask the pharmacist for help. Talk with your doctor before using decongestants if you have high blood pressure, heart disease, glaucoma, or prostate trouble.  · Antihistamines may help if allergies are causing your sinusitis. You can get chlorpheniramine and diphenhydramine over the counter, but these can cause drowsiness. Don't use these if you have glaucoma or if you are a man with trouble urinating due to an enlarged prostate. Over-the-counter antihistamines containing loratidine and cetirizine cause less drowsiness and may be a better choice for daytime use.  · When allergies cause your sinusitis, a saline nasal rinse may give relief. A saline nasal rinse reduces swelling and clears excess mucus. This allows sinuses to drain. Prepackaged kits are available at most drugstores. These contain premixed salt packets and an irrigation device. If antibiotics have been prescribed to treat an acute sinus infection, talk with your doctor before using a nasal rinse to be sure it is safe for you.  · You may use over-the-counter medicine to control pain and fever, unless another pain medicine was prescribed. Talk with your doctor before using acetaminophen or ibuprofen if you have chronic liver or kidney disease. Also talk with your doctor if you have ever had a stomach ulcer. Aspirin should never be used  in anyone under 18 years of age who has a fever. It may cause a life-threatening condition called Reye syndrome.  · If antibiotics were given, finish all of them, even if you are feeling better after a few days.  Follow-up care  Follow up with your healthcare provider, or as advised if your symptoms aren't better in 1 week.  Call 911  Call 911 if any of these occur:  · Unusual drowsiness or confusion  · Swelling of the forehead or eyelids  · Vision problems including blurred or double vision  · Seizure  When to seek medical advice  Call your healthcare provider right away if any of these occur:  · Facial pain or headache becomes more severe  · Stiff neck  · Fever over 100.4º F (38.0º C) for more than 3 days on antibiotics  · Bleeding from the nose or throat  Date Last Reviewed: 10/1/2016  © 7594-2897 [employer]. [address] [place] [address]. All rights reserved. This information is not intended as a substitute for professional medical care. Always follow your healthcare professional's instructions.        Sinusitis (No Antibiotics)    The sinuses are air-filled spaces within the bones of the face. They connect to the inside of the nose. Sinusitis is an inflammation of the tissue lining the sinus cavity. Sinus inflammation can occur during a cold. It can also be due to allergies to pollens and other particles in the air. It can cause symptoms such as sinus congestion, headache, sore throat, facial swelling and fullness. It may also cause a low-grade fever. No infection is present, and no antibiotic treatment is needed.  Home care  · Drink plenty of water, hot tea, and other liquids. This may help thin mucus. It also may promote sinus drainage.  · Heat may help soothe painful areas of the face. Use a towel soaked in hot water. Or,  the shower and direct the hot spray onto your face. Using a vaporizer along with a menthol rub at night may also help.   · An expectorant containing  guaifenesin may help thin the mucus and promote drainage from the sinuses.  · Over-the-counter decongestants may be used unless a similar medicine was prescribed. Nasal sprays work the fastest. Use one that contains phenylephrine or oxymetazoline. First blow the nose gently. Then use the spray. Do not use these medicines more often than directed on the label or symptoms may get worse. You may also use tablets containing pseudoephedrine. Avoid products that combine ingredients, because side effects may be increased. Read labels. You can also ask the pharmacist for help. (NOTE: Persons with high blood pressure should not use decongestants. They can raise blood pressure.)  · Over-the-counter antihistamines may help if allergies contributed to your sinusitis.    · Use acetaminophen or ibuprofen to control pain, unless another pain medicine was prescribed. (If you have chronic liver or kidney disease or ever had a stomach ulcer, talk with your doctor before using these medicines. Aspirin should never be used in anyone under 18 years of age who is ill with a fever. It may cause severe liver damage.)  · Use nasal rinses or irrigation as instructed by your health care provider.  · Don't smoke. This can worsen symptoms.  Follow-up care  Follow up with your healthcare provider or our staff if you are not improving within the next week.  When to seek medical advice  Call your healthcare provider if any of these occur:  · Green or yellow discharge from the nose or into the throat  · Facial pain or headache becoming more severe  · Stiff neck  · Unusual drowsiness or confusion  · Swelling of the forehead or eyelids  · Vision problems, including blurred or double vision  · Fever of 100.4ºF (38ºC) or higher, or as directed by your healthcare provider  · Seizure  · Breathing problems  · Symptoms not resolving within 10 days  Date Last Reviewed: 4/13/2015  © 9004-1130 [employer]. [address], [place]  [address]. All rights reserved. This information is not intended as a substitute for professional medical care. Always follow your healthcare professional's instructions.        Self-Care for Low Back Pain    Most people have low back pain now and then. In many cases, it isnt serious and self-care can help. Sometimes low back pain can be a sign of a bigger problem. Call your healthcare provider if your pain returns often or gets worse over time. For the long-term care of your back, get regular exercise, lose any excess weight and learn good posture.  Take a short rest  Lying down during the day may be beneficial for short periods of time if severe pain increases with sitting or standing. Long-term bed rest could be detrimental.  Reduce pain and swelling  Cold reduces swelling. Both cold and heat can reduce pain. Protect your skin by placing a towel between your body and the ice or heat source.  · For the first few days, apply an ice pack for 15 to 20 minutes .  · After the first few days, try heat for 15 minutes at a time to ease pain. Never sleep on a heating pad.  · Over-the-counter medicine can help control pain and swelling. Try aspirin or ibuprofen.  Exercise  Exercise can help your back heal. It also helps your back get stronger and more flexible, preventing any reinjury. Ask your healthcare provider about specific exercises for your back.  Use good posture to avoid reinjury  · When moving, bend at the hips and knees. Dont bend at the waist or twist around.  · When lifting, keep the object close to your body. Dont try to lift more than you can handle.  · When sitting, keep your lower back supported. Use a rolled-up towel as needed.  Seek immediate medical care if:  · Youre unable to stand or walk.  · You have a temperature over 100.4°F (38.0°C)  · You have frequent, painful, or bloody urination.  · You have severe abdominal pain.  · You have a sharp, stabbing pain.  · Your pain is constant.  · You have pain or  numbness in your leg.  · You feel pain in a new area of your back.  · You notice that the pain isnt decreasing after more than a week.   Date Last Reviewed: 9/29/2015  © 0530-4393 [employer]. [address] [place] [address], [place] [address]. All rights reserved. This information is not intended as a substitute for professional medical care. Always follow your healthcare professional's instructions.

## 2018-01-23 ENCOUNTER — OFFICE VISIT (OUTPATIENT)
Dept: FAMILY MEDICINE | Facility: CLINIC | Age: 43
End: 2018-01-23
Payer: COMMERCIAL

## 2018-01-23 ENCOUNTER — TELEPHONE (OUTPATIENT)
Dept: FAMILY MEDICINE | Facility: CLINIC | Age: 43
End: 2018-01-23

## 2018-01-23 ENCOUNTER — LAB VISIT (OUTPATIENT)
Dept: LAB | Facility: HOSPITAL | Age: 43
End: 2018-01-23
Attending: INTERNAL MEDICINE
Payer: COMMERCIAL

## 2018-01-23 VITALS
RESPIRATION RATE: 17 BRPM | TEMPERATURE: 98 F | WEIGHT: 288.81 LBS | DIASTOLIC BLOOD PRESSURE: 82 MMHG | BODY MASS INDEX: 38.28 KG/M2 | HEIGHT: 73 IN | HEART RATE: 72 BPM | OXYGEN SATURATION: 97 % | SYSTOLIC BLOOD PRESSURE: 144 MMHG

## 2018-01-23 DIAGNOSIS — R73.01 IMPAIRED FASTING BLOOD SUGAR: ICD-10-CM

## 2018-01-23 DIAGNOSIS — E78.5 HYPERLIPIDEMIA LDL GOAL <130: ICD-10-CM

## 2018-01-23 DIAGNOSIS — I10 ESSENTIAL HYPERTENSION: ICD-10-CM

## 2018-01-23 DIAGNOSIS — R40.0 HAS DAYTIME DROWSINESS: Primary | ICD-10-CM

## 2018-01-23 DIAGNOSIS — E66.01 MORBID OBESITY: ICD-10-CM

## 2018-01-23 DIAGNOSIS — D64.9 NORMOCYTIC ANEMIA: ICD-10-CM

## 2018-01-23 DIAGNOSIS — R53.83 FATIGUE, UNSPECIFIED TYPE: ICD-10-CM

## 2018-01-23 LAB
ALBUMIN SERPL BCP-MCNC: 3.8 G/DL
ALP SERPL-CCNC: 68 U/L
ALT SERPL W/O P-5'-P-CCNC: 42 U/L
ANION GAP SERPL CALC-SCNC: 6 MMOL/L
AST SERPL-CCNC: 24 U/L
BASOPHILS # BLD AUTO: 0.04 K/UL
BASOPHILS NFR BLD: 0.8 %
BILIRUB SERPL-MCNC: 0.5 MG/DL
BUN SERPL-MCNC: 13 MG/DL
CALCIUM SERPL-MCNC: 9.7 MG/DL
CHLORIDE SERPL-SCNC: 104 MMOL/L
CHOLEST SERPL-MCNC: 229 MG/DL
CHOLEST/HDLC SERPL: 4.8 {RATIO}
CO2 SERPL-SCNC: 30 MMOL/L
CREAT SERPL-MCNC: 1 MG/DL
DIFFERENTIAL METHOD: ABNORMAL
EOSINOPHIL # BLD AUTO: 0.2 K/UL
EOSINOPHIL NFR BLD: 4.8 %
ERYTHROCYTE [DISTWIDTH] IN BLOOD BY AUTOMATED COUNT: 13.6 %
EST. GFR  (AFRICAN AMERICAN): >60 ML/MIN/1.73 M^2
EST. GFR  (NON AFRICAN AMERICAN): >60 ML/MIN/1.73 M^2
ESTIMATED AVG GLUCOSE: 120 MG/DL
GLUCOSE SERPL-MCNC: 114 MG/DL
HBA1C MFR BLD HPLC: 5.8 %
HCT VFR BLD AUTO: 40.4 %
HDLC SERPL-MCNC: 48 MG/DL
HDLC SERPL: 21 %
HGB BLD-MCNC: 14.6 G/DL
LDLC SERPL CALC-MCNC: 144.4 MG/DL
LYMPHOCYTES # BLD AUTO: 2.3 K/UL
LYMPHOCYTES NFR BLD: 48.3 %
MCH RBC QN AUTO: 29.6 PG
MCHC RBC AUTO-ENTMCNC: 36.1 G/DL
MCV RBC AUTO: 82 FL
MONOCYTES # BLD AUTO: 0.6 K/UL
MONOCYTES NFR BLD: 11.4 %
NEUTROPHILS # BLD AUTO: 1.7 K/UL
NEUTROPHILS NFR BLD: 34.7 %
NONHDLC SERPL-MCNC: 181 MG/DL
PLATELET # BLD AUTO: 238 K/UL
PMV BLD AUTO: 9.2 FL
POTASSIUM SERPL-SCNC: 3.7 MMOL/L
PROT SERPL-MCNC: 7.7 G/DL
RBC # BLD AUTO: 4.94 M/UL
SODIUM SERPL-SCNC: 140 MMOL/L
TRIGL SERPL-MCNC: 183 MG/DL
TSH SERPL DL<=0.005 MIU/L-ACNC: 0.86 UIU/ML
WBC # BLD AUTO: 4.84 K/UL

## 2018-01-23 PROCEDURE — 80061 LIPID PANEL: CPT

## 2018-01-23 PROCEDURE — 85025 COMPLETE CBC W/AUTO DIFF WBC: CPT

## 2018-01-23 PROCEDURE — 84443 ASSAY THYROID STIM HORMONE: CPT

## 2018-01-23 PROCEDURE — 99214 OFFICE O/P EST MOD 30 MIN: CPT | Mod: S$GLB,,, | Performed by: INTERNAL MEDICINE

## 2018-01-23 PROCEDURE — 36415 COLL VENOUS BLD VENIPUNCTURE: CPT | Mod: PN

## 2018-01-23 PROCEDURE — 80053 COMPREHEN METABOLIC PANEL: CPT

## 2018-01-23 PROCEDURE — 83020 HEMOGLOBIN ELECTROPHORESIS: CPT

## 2018-01-23 PROCEDURE — 83036 HEMOGLOBIN GLYCOSYLATED A1C: CPT

## 2018-01-23 PROCEDURE — 99999 PR PBB SHADOW E&M-EST. PATIENT-LVL IV: CPT | Mod: PBBFAC,,, | Performed by: INTERNAL MEDICINE

## 2018-01-23 RX ORDER — AMLODIPINE BESYLATE 5 MG/1
5 TABLET ORAL DAILY
Qty: 90 TABLET | Refills: 3 | Status: SHIPPED | OUTPATIENT
Start: 2018-01-23 | End: 2018-08-22

## 2018-01-23 RX ORDER — LOSARTAN POTASSIUM AND HYDROCHLOROTHIAZIDE 25; 100 MG/1; MG/1
1 TABLET ORAL DAILY
Qty: 90 TABLET | Refills: 3 | Status: SHIPPED | OUTPATIENT
Start: 2018-01-23 | End: 2019-04-02 | Stop reason: SDUPTHER

## 2018-01-23 NOTE — PROGRESS NOTES
"Subjective:       Patient ID: Layla Siegel is a 42 y.o. male.    Chief Complaint: Establish Care    Htn, est pcp    HPI: 43 y/o with HTN (on medications > 15 years) presents to establish PCP. No history of CAD, CVA non smoker. Does have strong family history of CAD (dad with CABG at age 60 both grandfathers with MI in 50's). Not exercising regularly not following any paticular diet. Drinking sodas daily has cut down on fast food since gallbladder surgery Sep 2017. No LE swelling does endorse some daytime somulence GF reports no snoring.     PMHx: HTN, cholecystectomy  FamHx: CAD as above negative for DM  SocHx: non smoker drinks on weekends vodka two drinks per sitting no illicits. 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   HENT: Negative for ear pain, rhinorrhea and sore throat.    Eyes: Negative for discharge and visual disturbance.   Respiratory: Negative for chest tightness, shortness of breath and wheezing.    Cardiovascular: Negative for chest pain, palpitations and leg swelling.   Gastrointestinal: Negative for abdominal pain, constipation and diarrhea.   Endocrine: Negative for cold intolerance and heat intolerance.   Genitourinary: Negative for dysuria and hematuria.   Musculoskeletal: Negative for joint swelling and neck stiffness.   Skin: Negative for rash.   Neurological: Negative for dizziness, syncope, weakness and headaches.   Psychiatric/Behavioral: Negative for suicidal ideas.       Objective:     Vitals:    01/23/18 0827 01/23/18 0904   BP: (!) 130/94 (!) 144/82   BP Location: Left arm    Patient Position: Sitting    BP Method: Large (Manual)    Pulse: 70 72   Resp: 17    Temp: 98 °F (36.7 °C)    TempSrc: Oral    SpO2: 97%    Weight: 131 kg (288 lb 12.8 oz)    Height: 6' 1" (1.854 m)           Physical Exam   Constitutional: He is oriented to person, " place, and time. He appears well-developed and well-nourished.   HENT:   Head: Normocephalic and atraumatic.   Eyes: Conjunctivae are normal. Pupils are equal, round, and reactive to light. No scleral icterus.   Neck: Normal range of motion. No thyromegaly present.   Cardiovascular: Normal rate and regular rhythm.  Exam reveals no gallop and no friction rub.    No murmur heard.  No LE edema   Pulmonary/Chest: Effort normal and breath sounds normal. He has no wheezes. He has no rales.   Abdominal: Soft. Bowel sounds are normal. There is no tenderness. There is no rebound and no guarding.   Musculoskeletal: Normal range of motion. He exhibits no edema or tenderness.   Neurological: He is alert and oriented to person, place, and time. No cranial nerve deficit.   Skin: Skin is warm and dry.   Psychiatric: He has a normal mood and affect.       Assessment:       1. Has daytime drowsiness    2. Essential hypertension    3. Morbid obesity        Plan:    1/2/3. BP above goal no meds today check fasting labs TSH for secondary causes in light of body habitus and daytime somulence and poorly controlled HTN high suspcion for CARMITA referal to sleep medicine for consideration of PSG. The patient is asked to make an attempt to improve diet and exercise patterns to aid in medical management of this problem.  Enroll in digital HTN program discussed importance of cuttiong out sodas screen for DM with A1c

## 2018-01-25 LAB
HGB A2 MFR BLD HPLC: 2.5 %
HGB FRACT BLD ELPH-IMP: NORMAL
HGB FRACT BLD ELPH-IMP: NORMAL

## 2018-01-26 ENCOUNTER — PATIENT MESSAGE (OUTPATIENT)
Dept: FAMILY MEDICINE | Facility: CLINIC | Age: 43
End: 2018-01-26

## 2018-01-26 DIAGNOSIS — E78.5 HYPERLIPIDEMIA LDL GOAL <130: Primary | ICD-10-CM

## 2018-01-26 RX ORDER — PRAVASTATIN SODIUM 40 MG/1
40 TABLET ORAL DAILY
Qty: 90 TABLET | Refills: 3 | Status: SHIPPED | OUTPATIENT
Start: 2018-01-26 | End: 2018-08-22

## 2018-03-22 ENCOUNTER — OFFICE VISIT (OUTPATIENT)
Dept: FAMILY MEDICINE | Facility: CLINIC | Age: 43
End: 2018-03-22
Payer: COMMERCIAL

## 2018-03-22 VITALS
OXYGEN SATURATION: 96 % | HEIGHT: 73 IN | RESPIRATION RATE: 18 BRPM | HEART RATE: 74 BPM | TEMPERATURE: 98 F | SYSTOLIC BLOOD PRESSURE: 142 MMHG | BODY MASS INDEX: 38.22 KG/M2 | DIASTOLIC BLOOD PRESSURE: 84 MMHG | WEIGHT: 288.38 LBS

## 2018-03-22 DIAGNOSIS — L03.90 CELLULITIS, UNSPECIFIED CELLULITIS SITE: Primary | ICD-10-CM

## 2018-03-22 DIAGNOSIS — J01.90 ACUTE SINUSITIS, RECURRENCE NOT SPECIFIED, UNSPECIFIED LOCATION: ICD-10-CM

## 2018-03-22 PROCEDURE — 99999 PR PBB SHADOW E&M-EST. PATIENT-LVL IV: CPT | Mod: PBBFAC,,, | Performed by: NURSE PRACTITIONER

## 2018-03-22 PROCEDURE — 99214 OFFICE O/P EST MOD 30 MIN: CPT | Mod: S$GLB,,, | Performed by: NURSE PRACTITIONER

## 2018-03-22 PROCEDURE — 3077F SYST BP >= 140 MM HG: CPT | Mod: CPTII,S$GLB,, | Performed by: NURSE PRACTITIONER

## 2018-03-22 PROCEDURE — 3079F DIAST BP 80-89 MM HG: CPT | Mod: CPTII,S$GLB,, | Performed by: NURSE PRACTITIONER

## 2018-03-22 RX ORDER — MUPIROCIN 20 MG/G
OINTMENT TOPICAL 3 TIMES DAILY
Qty: 15 G | Refills: 0 | Status: SHIPPED | OUTPATIENT
Start: 2018-03-22 | End: 2018-04-24 | Stop reason: ALTCHOICE

## 2018-03-22 RX ORDER — CEPHALEXIN 500 MG/1
500 CAPSULE ORAL EVERY 6 HOURS
Qty: 14 CAPSULE | Refills: 0 | Status: SHIPPED | OUTPATIENT
Start: 2018-03-22 | End: 2018-04-24 | Stop reason: ALTCHOICE

## 2018-03-22 RX ORDER — FLUTICASONE PROPIONATE 50 MCG
2 SPRAY, SUSPENSION (ML) NASAL DAILY
Qty: 1 BOTTLE | Refills: 2 | Status: SHIPPED | OUTPATIENT
Start: 2018-03-22 | End: 2018-06-20

## 2018-03-22 RX ORDER — LEVOCETIRIZINE DIHYDROCHLORIDE 5 MG/1
5 TABLET, FILM COATED ORAL NIGHTLY
Qty: 30 TABLET | Refills: 0 | Status: SHIPPED | OUTPATIENT
Start: 2018-03-22 | End: 2018-04-24 | Stop reason: SDUPTHER

## 2018-03-22 NOTE — PROGRESS NOTES
Subjective:       Patient ID: Layla Siegel is a 42 y.o. male.    Chief Complaint: Cyst (over (R) ear painful when touched) and Otalgia (swimmers ear type of pressure)    Gage Siegel is here for a 1 day h/o R scalp pain. Unsure of any inciting event. No fever.  He also c/o fullness to B ears. No treatment attempted  Review of Systems   Constitutional: Negative for fever.   HENT: Positive for ear pain.    Respiratory: Negative.    Cardiovascular: Negative.        Objective:      Physical Exam   Constitutional: He is oriented to person, place, and time. He appears well-developed and well-nourished. He does not appear ill. No distress.   HENT:   Head:       Right Ear: A middle ear effusion is present.   Left Ear: A middle ear effusion is present.   Nose: Mucosal edema and rhinorrhea present.   Mouth/Throat: Uvula is midline, oropharynx is clear and moist and mucous membranes are normal.   Cardiovascular: Normal rate.    Pulmonary/Chest: Effort normal.   Musculoskeletal: Normal range of motion.   Neurological: He is alert and oriented to person, place, and time.   Skin: Skin is warm and dry.   Vitals reviewed.      Assessment:       1. Cellulitis, unspecified cellulitis site    2. Acute sinusitis, recurrence not specified, unspecified location        Plan:       Cellulitis, unspecified cellulitis site  -     mupirocin (BACTROBAN) 2 % ointment; Apply topically 3 (three) times daily.  Dispense: 15 g; Refill: 0  -     cephALEXin (KEFLEX) 500 MG capsule; Take 1 capsule (500 mg total) by mouth every 6 (six) hours.  Dispense: 14 capsule; Refill: 0  [place] be going [place] shore [place], I have instructed him to begin oral abx if it gets worse in the next 3-4 days.    Acute sinusitis, recurrence not specified, unspecified location  -     levocetirizine (XYZAL) 5 MG tablet; Take 1 tablet (5 mg total) by mouth every evening.  Dispense: 30 tablet; Refill: 0  -     fluticasone (FLONASE) 50 mcg/actuation nasal spray; 2 sprays (100  mcg total) by Each Nare route once daily.  Dispense: 1 Bottle; Refill: 2  Most likely viral, will treat symptomatically    Follow up with primary care provider if not improved  Go to ER for new, worse or concerning symptoms

## 2018-03-22 NOTE — PATIENT INSTRUCTIONS
Follow up with primary care provider if not improved  Go to ER for new, worse or concerning symptoms  Begin cefelexin on Sunday if R ear area is still painful    Discharge Instructions for Cellulitis  You have been diagnosed with cellulitis. This is an infection in the deepest layer of the skin. In some cases, the infection also affects the muscle. Cellulitis is caused by bacteria. The bacteria can enter the body through broken skin. This can happen with a cut, scratch, animal bite, or an insect bite that has been scratched. You may have been treated in the hospital with antibiotics and fluids. You will likely be given a prescription for antibiotics to take at home. This sheet will help you take care of yourself at home.  Home care  When you are home:  · Take the prescribed antibiotic medicine you are given as directed until it is gone. Take it even if you feel better. It treats the infection and stops it from returning. Not taking all the medicine can make future infections hard to treat.  · Keep the infected area clean.  · When possible, raise the infected area above the level of your heart. This helps keep swelling down.  · Talk with your healthcare provider if you are in pain. Ask what kind of over-the-counter medicine you can take for pain.  · Apply clean bandages as advised.  · Take your temperature once a day for a week.  · Wash your hands often to prevent spreading the infection.  In the future, wash your hands before and after you touch cuts, scratches, or bandages. This will help prevent infection.   When to call your healthcare provider  Call your healthcare provider immediately if you have any of the following:  · Difficulty or pain when moving the joints above or below the infected area  · Discharge or pus draining from the area  · Fever of 100.4°F (38°C) or higher, or as directed by your healthcare provider  · Pain that gets worse in or around the infected   · Redness that gets worse in or around the  infected area, particularly if the area of redness expands to a wider area  · Shaking chills  · Swelling of the infected area  · Vomiting   Date Last Reviewed: 8/1/2016  © 6699-2676 [employer]. [address] [place] [address], [place] [address]. All rights reserved. This information is not intended as a substitute for professional medical care. Always follow your healthcare professional's instructions.

## 2018-04-24 ENCOUNTER — OFFICE VISIT (OUTPATIENT)
Dept: FAMILY MEDICINE | Facility: CLINIC | Age: 43
End: 2018-04-24
Payer: COMMERCIAL

## 2018-04-24 ENCOUNTER — LAB VISIT (OUTPATIENT)
Dept: LAB | Facility: HOSPITAL | Age: 43
End: 2018-04-24
Attending: INTERNAL MEDICINE
Payer: COMMERCIAL

## 2018-04-24 VITALS
RESPIRATION RATE: 17 BRPM | TEMPERATURE: 98 F | WEIGHT: 292.31 LBS | DIASTOLIC BLOOD PRESSURE: 92 MMHG | OXYGEN SATURATION: 97 % | BODY MASS INDEX: 38.74 KG/M2 | HEART RATE: 68 BPM | SYSTOLIC BLOOD PRESSURE: 126 MMHG | HEIGHT: 73 IN

## 2018-04-24 DIAGNOSIS — J30.89 NON-SEASONAL ALLERGIC RHINITIS DUE TO OTHER ALLERGIC TRIGGER: ICD-10-CM

## 2018-04-24 DIAGNOSIS — I10 HYPERTENSION, ESSENTIAL: Primary | ICD-10-CM

## 2018-04-24 DIAGNOSIS — E66.01 MORBID OBESITY: ICD-10-CM

## 2018-04-24 DIAGNOSIS — I10 HYPERTENSION, ESSENTIAL: ICD-10-CM

## 2018-04-24 DIAGNOSIS — R06.83 SNORES: ICD-10-CM

## 2018-04-24 DIAGNOSIS — E88.810 METABOLIC SYNDROME: ICD-10-CM

## 2018-04-24 DIAGNOSIS — M79.10 MYALGIA: ICD-10-CM

## 2018-04-24 LAB
ALBUMIN SERPL BCP-MCNC: 3.9 G/DL
ALP SERPL-CCNC: 63 U/L
ALT SERPL W/O P-5'-P-CCNC: 39 U/L
ANION GAP SERPL CALC-SCNC: 6 MMOL/L
AST SERPL-CCNC: 32 U/L
BASOPHILS # BLD AUTO: 0.01 K/UL
BASOPHILS NFR BLD: 0.2 %
BILIRUB SERPL-MCNC: 0.6 MG/DL
BUN SERPL-MCNC: 13 MG/DL
CALCIUM SERPL-MCNC: 9.6 MG/DL
CHLORIDE SERPL-SCNC: 103 MMOL/L
CHOLEST SERPL-MCNC: 170 MG/DL
CHOLEST/HDLC SERPL: 3.7 {RATIO}
CK SERPL-CCNC: 685 U/L
CO2 SERPL-SCNC: 30 MMOL/L
CREAT SERPL-MCNC: 1 MG/DL
DIFFERENTIAL METHOD: ABNORMAL
EOSINOPHIL # BLD AUTO: 0.2 K/UL
EOSINOPHIL NFR BLD: 3.7 %
ERYTHROCYTE [DISTWIDTH] IN BLOOD BY AUTOMATED COUNT: 13.6 %
EST. GFR  (AFRICAN AMERICAN): >60 ML/MIN/1.73 M^2
EST. GFR  (NON AFRICAN AMERICAN): >60 ML/MIN/1.73 M^2
GLUCOSE SERPL-MCNC: 103 MG/DL
HCT VFR BLD AUTO: 40.1 %
HDLC SERPL-MCNC: 46 MG/DL
HDLC SERPL: 27.1 %
HGB BLD-MCNC: 14 G/DL
LDLC SERPL CALC-MCNC: 91.2 MG/DL
LYMPHOCYTES # BLD AUTO: 2.4 K/UL
LYMPHOCYTES NFR BLD: 49.5 %
MCH RBC QN AUTO: 29.8 PG
MCHC RBC AUTO-ENTMCNC: 34.9 G/DL
MCV RBC AUTO: 85 FL
MONOCYTES # BLD AUTO: 0.6 K/UL
MONOCYTES NFR BLD: 12.3 %
NEUTROPHILS # BLD AUTO: 1.7 K/UL
NEUTROPHILS NFR BLD: 34.1 %
NONHDLC SERPL-MCNC: 124 MG/DL
PLATELET # BLD AUTO: 241 K/UL
PMV BLD AUTO: 9.5 FL
POTASSIUM SERPL-SCNC: 3.7 MMOL/L
PROT SERPL-MCNC: 7.7 G/DL
RBC # BLD AUTO: 4.7 M/UL
SODIUM SERPL-SCNC: 139 MMOL/L
TRIGL SERPL-MCNC: 164 MG/DL
WBC # BLD AUTO: 4.89 K/UL

## 2018-04-24 PROCEDURE — 80053 COMPREHEN METABOLIC PANEL: CPT

## 2018-04-24 PROCEDURE — 3074F SYST BP LT 130 MM HG: CPT | Mod: CPTII,S$GLB,, | Performed by: INTERNAL MEDICINE

## 2018-04-24 PROCEDURE — 83036 HEMOGLOBIN GLYCOSYLATED A1C: CPT

## 2018-04-24 PROCEDURE — 99999 PR PBB SHADOW E&M-EST. PATIENT-LVL III: CPT | Mod: PBBFAC,,, | Performed by: INTERNAL MEDICINE

## 2018-04-24 PROCEDURE — 82550 ASSAY OF CK (CPK): CPT

## 2018-04-24 PROCEDURE — 80061 LIPID PANEL: CPT

## 2018-04-24 PROCEDURE — 3080F DIAST BP >= 90 MM HG: CPT | Mod: CPTII,S$GLB,, | Performed by: INTERNAL MEDICINE

## 2018-04-24 PROCEDURE — 99214 OFFICE O/P EST MOD 30 MIN: CPT | Mod: S$GLB,,, | Performed by: INTERNAL MEDICINE

## 2018-04-24 PROCEDURE — 85025 COMPLETE CBC W/AUTO DIFF WBC: CPT

## 2018-04-24 PROCEDURE — 36415 COLL VENOUS BLD VENIPUNCTURE: CPT | Mod: PN

## 2018-04-24 RX ORDER — METFORMIN HYDROCHLORIDE 500 MG/1
500 TABLET, EXTENDED RELEASE ORAL
Qty: 90 TABLET | Refills: 1 | Status: SHIPPED | OUTPATIENT
Start: 2018-04-24 | End: 2018-08-22

## 2018-04-24 RX ORDER — LEVOCETIRIZINE DIHYDROCHLORIDE 5 MG/1
5 TABLET, FILM COATED ORAL NIGHTLY
Qty: 90 TABLET | Refills: 3 | Status: SHIPPED | OUTPATIENT
Start: 2018-04-24 | End: 2019-11-07 | Stop reason: ALTCHOICE

## 2018-04-24 NOTE — PROGRESS NOTES
Subjective:       Patient ID: Layla Siegel is a 42 y.o. male.    Chief Complaint: Hypertension and Follow-up    Hypertension   This is a chronic problem. The current episode started more than 1 year ago. The problem has been gradually improving since onset. The problem is controlled. Associated symptoms include malaise/fatigue. Pertinent negatives include no anxiety, chest pain, headaches, orthopnea, palpitations, peripheral edema or shortness of breath. Risk factors for coronary artery disease include male gender, obesity and dyslipidemia. Past treatments include angiotensin blockers, diuretics and calcium channel blockers. The current treatment provides moderate improvement. Compliance problems include exercise and diet.  suspect sleep apnea needs sleep study.   mildly elevated a1c elevated triglycerides. Started statin three months  Review of Systems   Constitutional: Positive for malaise/fatigue. Negative for activity change, appetite change, fatigue, fever and unexpected weight change.   HENT: Negative for ear pain, rhinorrhea and sore throat.    Eyes: Negative for discharge and visual disturbance.   Respiratory: Negative for chest tightness, shortness of breath and wheezing.    Cardiovascular: Negative for chest pain, palpitations, orthopnea and leg swelling.   Gastrointestinal: Negative for abdominal pain, constipation and diarrhea.   Endocrine: Negative for cold intolerance and heat intolerance.   Genitourinary: Negative for dysuria and hematuria.   Musculoskeletal: Negative for joint swelling and neck stiffness.   Skin: Negative for rash.   Neurological: Negative for dizziness, syncope, weakness and headaches.   Psychiatric/Behavioral: Negative for suicidal ideas.       Objective:     Vitals:    04/24/18 0952   BP: (!) 126/92   BP Location: Left arm   Patient Position: Sitting   BP Method: Large (Manual)   Pulse: 68   Resp: 17   Temp: 98.2 °F (36.8 °C)   TempSrc: Oral   SpO2: 97%   Weight: 132.6 kg (292 lb  "5.3 oz)   Height: 6' 1" (1.854 m)          Physical Exam   Constitutional: He is oriented to person, place, and time. He appears well-developed and well-nourished.   HENT:   Head: Normocephalic and atraumatic.   Eyes: Conjunctivae are normal. Pupils are equal, round, and reactive to light.   Neck: Normal range of motion.   Cardiovascular: Normal rate and regular rhythm.  Exam reveals no gallop and no friction rub.    No murmur heard.  No LE edema   Pulmonary/Chest: Effort normal and breath sounds normal. He has no wheezes. He has no rales.   Abdominal: Soft. Bowel sounds are normal. There is no tenderness. There is no rebound and no guarding.   Musculoskeletal: Normal range of motion. He exhibits no edema or tenderness.   Neurological: He is alert and oriented to person, place, and time. No cranial nerve deficit.   Skin: Skin is warm and dry.   Psychiatric: He has a normal mood and affect.       Assessment and Plan   1. Hypertension, essential  stabel continue curretn emedciatons  - CBC auto differential; Future  - Comprehensive metabolic panel; Future    2. Morbid obesity  The patient is asked to make an attempt to improve diet and exercise patterns to aid in medical management of this problem.  Metformin for #4    3. Myalgia  Since starting statin periodic shoulder soreness check ck  - CK; Future    4. Metabolic syndrome  Start low dose metformin daily on statin BP just driss goal   - Comprehensive metabolic panel; Future  - Lipid panel; Future  - Hemoglobin A1c; Future  - metFORMIN (GLUCOPHAGE-XR) 500 MG 24 hr tablet; Take 1 tablet (500 mg total) by mouth daily with breakfast.  Dispense: 90 tablet; Refill: 1    5. Non-seasonal allergic rhinitis due to other allergic trigger  Improved with antihistamine continue  - levocetirizine (XYZAL) 5 MG tablet; Take 1 tablet (5 mg total) by mouth every evening.  Dispense: 90 tablet; Refill: 3    6. Snores  Strong risk factors for CARMITA referral to sleep medicine  - Ambulatory " consult to Sleep Disorders

## 2018-04-25 LAB
ESTIMATED AVG GLUCOSE: 117 MG/DL
HBA1C MFR BLD HPLC: 5.7 %

## 2018-04-30 ENCOUNTER — TELEPHONE (OUTPATIENT)
Dept: FAMILY MEDICINE | Facility: CLINIC | Age: 43
End: 2018-04-30

## 2018-05-08 ENCOUNTER — OFFICE VISIT (OUTPATIENT)
Dept: SLEEP MEDICINE | Facility: CLINIC | Age: 43
End: 2018-05-08
Payer: COMMERCIAL

## 2018-05-08 VITALS
TEMPERATURE: 98 F | WEIGHT: 292.31 LBS | DIASTOLIC BLOOD PRESSURE: 90 MMHG | SYSTOLIC BLOOD PRESSURE: 142 MMHG | HEART RATE: 78 BPM | HEIGHT: 73 IN | BODY MASS INDEX: 38.74 KG/M2 | OXYGEN SATURATION: 96 %

## 2018-05-08 DIAGNOSIS — E66.01 SEVERE OBESITY (BMI 35.0-39.9) WITH COMORBIDITY: ICD-10-CM

## 2018-05-08 DIAGNOSIS — R09.81 NASAL CONGESTION: ICD-10-CM

## 2018-05-08 DIAGNOSIS — G25.81 RLS (RESTLESS LEGS SYNDROME): ICD-10-CM

## 2018-05-08 DIAGNOSIS — E87.29 CARBON DIOXIDE RETENTION: ICD-10-CM

## 2018-05-08 DIAGNOSIS — G47.33 OSA (OBSTRUCTIVE SLEEP APNEA): Primary | ICD-10-CM

## 2018-05-08 DIAGNOSIS — E78.5 HYPERLIPIDEMIA LDL GOAL <130: ICD-10-CM

## 2018-05-08 DIAGNOSIS — M79.10 MYALGIA: ICD-10-CM

## 2018-05-08 PROCEDURE — 3080F DIAST BP >= 90 MM HG: CPT | Mod: CPTII,S$GLB,, | Performed by: NURSE PRACTITIONER

## 2018-05-08 PROCEDURE — 3008F BODY MASS INDEX DOCD: CPT | Mod: CPTII,S$GLB,, | Performed by: NURSE PRACTITIONER

## 2018-05-08 PROCEDURE — 99204 OFFICE O/P NEW MOD 45 MIN: CPT | Mod: S$GLB,,, | Performed by: NURSE PRACTITIONER

## 2018-05-08 PROCEDURE — 99999 PR PBB SHADOW E&M-EST. PATIENT-LVL IV: CPT | Mod: PBBFAC,,, | Performed by: NURSE PRACTITIONER

## 2018-05-08 PROCEDURE — 3077F SYST BP >= 140 MM HG: CPT | Mod: CPTII,S$GLB,, | Performed by: NURSE PRACTITIONER

## 2018-05-08 NOTE — PROGRESS NOTES
Layla Siegel  was seen as a new patient in Sleep Department at the request of Dr. Rose, Anival MARTÍNEZ MD for the evaluation of   sleep apnea.    CHIEF COMPLAINT:    Chief Complaint   Patient presents with    Sleep Apnea     snoring       HISTORY OF PRESENT ILLNESS: Layla Siegel is a 42 y.o. male with HTN, morbid obesity, HLP, metabolic syndrome, nonseasonal allergic rhinitis, snoring with Elevated CO2 is here for sleep evaluation. Patient reports wife complains that he snores and stops breathing at night. Pt has symptoms of unrefreshing sleep, excessive daytime sleepiness and fatigue.    No sleep paralysis, no sleep talking or walking, no hypnic hallucinations, no bruxism, occasional leg pain and numbness  that occurs only at night and improves with mobility.       EPWORTH SLEEPINESS SCALE 5/8/2018   Sitting and reading 2   Watching TV 2   Sitting, inactive in a public place (e.g. a theatre or a meeting) 1   As a passenger in a car for an hour without a break 2   Lying down to rest in the afternoon when circumstances permit 1   Sitting and talking to someone 1   Sitting quietly after a lunch without alcohol 2   In a car, while stopped for a few minutes in traffic 1   Total score 12       SLEEP ROUTINE:  Activity the hour prior to sleep: tv   Bed partner:  Wife complains snore, + witnessed apnea, waking up snorting/snoring/gasping   Time to bed:  11  Lights off:  11  Sleep onset latency:  2 minutes       Disruptions or awakenings:    1 (no difficulty falling back to sleep-usually within couple minutes)    Wakeup time:    7 am  Perceived sleep quality:  unrested  Stay until 9-10 am      Daytime naps:      1 nap 5-10 minutes  Weekend sleep routine:     same  Caffeine use: sodas 1-2 a day, tea 1 a day  exercise habit:   no    PAST MEDICAL HISTORY:    Active Ambulatory Problems     Diagnosis Date Noted    Hyperlipidemia LDL goal <130 12/03/2014    Obesity (BMI 30-39.9) 02/03/2015    Benign essential HTN 10/30/2015     Prediabetes 09/11/2017     Resolved Ambulatory Problems     Diagnosis Date Noted    Screening for colon cancer 12/15/2015    Acute maxillary sinusitis 12/23/2015    Bilateral flank pain 12/23/2015    Chest pain     Acute and chronic cholecystitis 08/31/2017     Past Medical History:   Diagnosis Date    High cholesterol     Hypertension                 PAST SURGICAL HISTORY:    Past Surgical History:   Procedure Laterality Date    CHOLECYSTECTOMY  08/2017    COLONOSCOPY N/A 12/15/2015    Procedure: COLONOSCOPY;  Surgeon: Horace Bella MD;  Location: [facility];  Service: Endoscopy;  Laterality: N/A;         FAMILY HISTORY:                Family History   Problem Relation Age of Onset    Hypertension Mother     Transient ischemic attack Mother     Heart disease Maternal Grandfather     Heart disease Paternal Grandfather     Coronary artery disease Father         s/p cabg    Hypertension Brother        SOCIAL HISTORY:          Tobacco:   History   Smoking Status    Never Smoker   Smokeless Tobacco    Never Used       alcohol use:    History   Alcohol Use    Yes     Comment: occasional, once a month 1-2 mixed vodka drink                 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 90 tablet 3    fluticasone (FLONASE) 50 mcg/actuation nasal spray 2 sprays (100 mcg total) by Each Nare route once daily. 1 Bottle 2    levocetirizine (XYZAL) 5 MG tablet Take 1 tablet (5 mg total) by mouth every evening. 90 tablet 3    losartan-hydrochlorothiazide 100-25 mg (HYZAAR) 100-25 mg per tablet Take 1 tablet by mouth once daily. 90 tablet 3    metFORMIN (GLUCOPHAGE-XR) 500 MG 24 hr tablet Take 1 tablet (500 mg total) by mouth daily with breakfast. 90  "tablet 1    pravastatin (PRAVACHOL) 40 MG tablet Take 1 tablet (40 mg total) by mouth once daily. 90 tablet 3     No current facility-administered medications for this visit.                   REVIEW OF SYSTEMS:     Sleep related symptoms as per HPI.  CONST: weight gain    HEENT: Denies sinus congestion, dry mouth when sleep in chair  PULM: Denies dyspnea  CARD:  Denies palpitations, no chest pain  GI:  acid reflux ass with certain foods, manage with diet  : Denies polyuria  NEURO: Denies headaches  PSYCH: Denies mood disturbance- more irritable  HEME: Denies anemia  MS: back pain in am    Otherwise, a balance of systems reviewed is negative.          PHYSICAL EXAM:  Vitals:    05/08/18 1057   BP: (!) 142/90   Pulse: 78   Temp: 98.4 °F (36.9 °C)   TempSrc: Oral   SpO2: 96%   Weight: 132.6 kg (292 lb 5.3 oz)   Height: 6' 1" (1.854 m)   PainSc: 0-No pain     Body mass index is 38.57 kg/m².     GENERAL: Normal development, well groomed  HEENT:  Conjunctivae are non-erythematous; Pupils equal, round, and reactive to light; Nose is symmetrical; Nasal mucosa is pink and moist; Septum is midline; Inferior turbinates are hypertrophy left nare; Nasal airflow is limited on the left; Posterior pharynx is pink; Modified Mallampati: 3; Posterior palate is dependent; Tonsils +1; Uvula is edematous and  dependent;Tongue is high; Dentition is fair; No TMJ tenderness; Jaw opening and protrusion without click and without discomfort.  NECK: Supple. Neck circumference is 18 inches. No thyromegaly. No palpable nodes.     SKIN: On face and neck: No abrasions, no rashes, no lesions.  No subcutaneous nodules are palpable.  RESPIRATORY: Chest is clear to auscultation.  Normal chest expansion and non-labored breathing at rest.  CARDIOVASCULAR: Normal S1, S2.  No murmurs, gallops or rubs. No carotid bruits bilaterally.  EXTREMITIES: No edema. No clubbing. No cyanosis. Station normal. Gait normal.        NEURO/PSYCH: Oriented to time, place " and person. Normal attention span and concentration. Affect is full. Mood is normal.                                              DATA :  Lab Results   Component Value Date    TSH 0.861 01/23/2018 4/24/2018  CO2 23 - 29 mmol/L 30     1/23/2018  CO2 23 - 29 mmol/L 30       Lab Results   Component Value Date    CHOL 170 04/24/2018    CHOL 229 (H) 01/23/2018    CHOL 249 (H) 12/23/2016       Lab Results   Component Value Date    LDLCALC 91.2 04/24/2018    LDLCALC 144.4 01/23/2018    LDLCALC 146.2 12/23/2016     Lab Results   Component Value Date    TRIG 164 (H) 04/24/2018    TRIG 183 (H) 01/23/2018    TRIG 309 (H) 12/23/2016 4/24/2018  CPK 20 - 200 U/L 685         ASSESSMENT    ICD-10-CM ICD-9-CM    1. CARMITA (obstructive sleep apnea) G47.33 327.23 Polysomnogram (CPAP will be added if patient meets diagnostic criteria.)   2. Hyperlipidemia LDL goal <130 E78.5 272.4 Lipid panel      CK    reduce dose to 20 mg and recheck levels in 1 month   3. Severe obesity (BMI 35.0-39.9) with comorbidity E66.01 278.01 Ambulatory Referral to Bariatric Medicine   4. Carbon dioxide retention E87.2 276.2 Polysomnogram (CPAP will be added if patient meets diagnostic criteria.)   5. Myalgia M79.1 729.1 Lipid panel      CK   6. RLS (restless legs syndrome) G25.81 333.94 Ferritin   7. Nasal congestion R09.81 478.19        PLAN:    CARMITA (obstructive sleep apnea)  Sleep Apnea NEC. The patient symptomatically has snoring, witnessed apnea, EDS and fatigue with findings of thick neck, obesity, HTN. This warrants further investigation for possible obstructive sleep apnea.  Patient will be contacted after sleep study is done.      Diagnostic: Polysomnogram. The nature of this procedure and its indication was discussed with the patient. Recommend in-lab study for concern hypoventilation    Education: During our discussion today, we talked about the etiology of obstructive sleep apnea as well as the potential ramifications of untreated sleep apnea,  which could include daytime sleepiness, dementia, cognitive impairment, hypertension, heart disease and/or stroke. We discussed potential treatment options, which could include weight loss, body positioning, oral appliances (OA), continuous positive airway pressure (CPAP), or referral for surgical consideration.     Behavior modification which includes losing weight, exercising, changing the sleep position, abstaining from alcohol, and avoiding certain medications    Precautions: The patient was advised to abstain from driving should they feel sleepy or drowsy    -     Polysomnogram (CPAP will be added if patient meets diagnostic criteria.); Future    Carbon dioxide retention  Recommend in-lab study for concern hypoventilation  -     Polysomnogram (CPAP will be added if patient meets diagnostic criteria.); Future    Hyperlipidemia LDL goal <130/Myalgia  Comments:  reduce dose pravastatin to 20 mg and recheck levels in 1 month  Orders:  -     Lipid panel; Future; Expected date: 06/08/2018  -     CK; Future; Expected date: 06/08/2018    Severe obesity (BMI 35.0-39.9) with comorbidity  Pt aware of need weight loss and interested in bariatric medicine  -     Ambulatory Referral to Bariatric Medicine    RLS (restless legs syndrome)  May be due to iron deficiency, pt hx mild anemia following his gall bladder removal Aug 2017.   -     Ferritin; Future; Expected date: 06/08/2018    Nasal congestion  Advised compliance with flonase, proper administration technique reviewed with pt.      Thank you for allowing me the opportunity to participate in the care of your patient.    Patient will  follow up after study.     Please cc note to  Anival Hui MD.

## 2018-05-08 NOTE — LETTER
May 8, 2018      Anival Rose MD  [address] [facility]  [place] [address]           [facility]  [address] [facility]  [place] [address]  Phone: [phone]  Fax: [phone]          Patient: Layla Siegel   MR Number: [id number]   YOB: 1975   Date of Visit: 5/8/2018       Dear Dr. Anival Rose:    Thank you for referring Layla Siegel to me for evaluation. Attached you will find relevant portions of my assessment and plan of care.    If you have questions, please do not hesitate to call me. I look forward to following Layla Siegel along with you.    Sincerely,    Prema Forman, NP    Enclosure  CC:  No Recipients    If you would like to receive this communication electronically, please contact [email or web address] or [phone] to request more information on [employer] access.    For providers and/or their staff who would like to refer a patient to [facility], please contact us through our one-stop-shop provider referral line, [facility], at [phone].    If you feel you have received this communication in error or would no longer like to receive these types of communications, please e-mail [email or web address]

## 2018-05-17 ENCOUNTER — TELEPHONE (OUTPATIENT)
Dept: SLEEP MEDICINE | Facility: CLINIC | Age: 43
End: 2018-05-17

## 2018-05-17 DIAGNOSIS — G47.33 OSA (OBSTRUCTIVE SLEEP APNEA): Primary | ICD-10-CM

## 2018-05-22 ENCOUNTER — TELEPHONE (OUTPATIENT)
Dept: SLEEP MEDICINE | Facility: HOSPITAL | Age: 43
End: 2018-05-22

## 2018-05-25 ENCOUNTER — OFFICE VISIT (OUTPATIENT)
Dept: FAMILY MEDICINE | Facility: CLINIC | Age: 43
End: 2018-05-25
Payer: COMMERCIAL

## 2018-05-25 VITALS
HEIGHT: 73 IN | TEMPERATURE: 98 F | WEIGHT: 290.81 LBS | HEART RATE: 81 BPM | DIASTOLIC BLOOD PRESSURE: 80 MMHG | OXYGEN SATURATION: 97 % | SYSTOLIC BLOOD PRESSURE: 124 MMHG | BODY MASS INDEX: 38.54 KG/M2

## 2018-05-25 DIAGNOSIS — J01.40 ACUTE NON-RECURRENT PANSINUSITIS: ICD-10-CM

## 2018-05-25 DIAGNOSIS — R07.9 CHEST PAIN, UNSPECIFIED TYPE: Primary | ICD-10-CM

## 2018-05-25 DIAGNOSIS — R94.31 ABNORMAL EKG: ICD-10-CM

## 2018-05-25 PROCEDURE — 93005 ELECTROCARDIOGRAM TRACING: CPT | Mod: S$GLB,,, | Performed by: FAMILY MEDICINE

## 2018-05-25 PROCEDURE — 99999 PR PBB SHADOW E&M-EST. PATIENT-LVL IV: CPT | Mod: PBBFAC,,, | Performed by: FAMILY MEDICINE

## 2018-05-25 PROCEDURE — 99214 OFFICE O/P EST MOD 30 MIN: CPT | Mod: S$GLB,,, | Performed by: FAMILY MEDICINE

## 2018-05-25 PROCEDURE — 3079F DIAST BP 80-89 MM HG: CPT | Mod: CPTII,S$GLB,, | Performed by: FAMILY MEDICINE

## 2018-05-25 PROCEDURE — 93010 ELECTROCARDIOGRAM REPORT: CPT | Mod: S$GLB,,, | Performed by: INTERNAL MEDICINE

## 2018-05-25 PROCEDURE — 3074F SYST BP LT 130 MM HG: CPT | Mod: CPTII,S$GLB,, | Performed by: FAMILY MEDICINE

## 2018-05-25 PROCEDURE — 3008F BODY MASS INDEX DOCD: CPT | Mod: CPTII,S$GLB,, | Performed by: FAMILY MEDICINE

## 2018-05-25 NOTE — PATIENT INSTRUCTIONS

## 2018-05-25 NOTE — PROGRESS NOTES
"Chief Complaint   Patient presents with    Sinusitis    Chest Pain      yesterday / sit up/ worse when lying down        HPI    Layla Siegel is 42 y.o. male. The primary encounter diagnosis was Chest pain, unspecified type. Diagnoses of Abnormal EKG and Acute non-recurrent pansinusitis were also pertinent to this visit.      42-year-old male with diabetes, hypertension, comes to clinic with complaint of chest pain as well as sinus pressure and pain. Patient reports that all of his symptoms began within the last 36 hr.  Reports experiencing chest pain on the previous day.  He reports some emotional stress and sudden onset of of shortness of breath.  He denies pain or other symptoms at this time.  He recalls eating shortly after.  High upon laying down 12 hr later he reports feeling "horrible" when he tried laying flat.  His symptoms resolved spontaneously after taking a Xyzal which caused him to become sleepy.    Patient reports a history of allergic rhinitis. Patient reports blowing his nose and seeing a small amount of blood.  He reports right-sided ear pain. He also notes headache, nasal congestion, a mild sore throat.      Review of Systems   Constitutional: Positive for fatigue. Negative for activity change, chills, diaphoresis and fever.   HENT: Positive for congestion, ear pain, sinus pain, sinus pressure and sore throat. Negative for tinnitus and trouble swallowing.    Respiratory: Positive for shortness of breath. Negative for cough and chest tightness.    Cardiovascular: Positive for chest pain. Negative for leg swelling.   Musculoskeletal: Negative for gait problem.   Neurological: Positive for headaches. Negative for dizziness and light-headedness.   Psychiatric/Behavioral: Negative for suicidal ideas.           Current Outpatient Prescriptions:     amLODIPine (NORVASC) 5 MG tablet, Take 1 tablet (5 mg total) by mouth once daily., Disp: 90 tablet, Rfl: 3    fluticasone (FLONASE) 50 mcg/actuation " "nasal spray, 2 sprays (100 mcg total) by Each Nare route once daily., Disp: 1 Bottle, Rfl: 2    levocetirizine (XYZAL) 5 MG tablet, Take 1 tablet (5 mg total) by mouth every evening., Disp: 90 tablet, Rfl: 3    losartan-hydrochlorothiazide 100-25 mg (HYZAAR) 100-25 mg per tablet, Take 1 tablet by mouth once daily., Disp: 90 tablet, Rfl: 3    metFORMIN (GLUCOPHAGE-XR) 500 MG 24 hr tablet, Take 1 tablet (500 mg total) by mouth daily with breakfast., Disp: 90 tablet, Rfl: 1    pravastatin (PRAVACHOL) 40 MG tablet, Take 1 tablet (40 mg total) by mouth once daily., Disp: 90 tablet, Rfl: 3      Blood pressure 124/80, pulse 81, temperature 98.3 °F (36.8 °C), temperature source Oral, height 6' 1" (1.854 m), weight 131.9 kg (290 lb 12.6 oz), SpO2 97 %.    Physical Exam   Constitutional: Vital signs are normal. He appears well-developed.   HENT:   Mouth/Throat: Normal dentition.   Neck: Trachea normal. No thyromegaly present.   Cardiovascular: Normal rate, regular rhythm and intact distal pulses.    No murmur heard.  Pulmonary/Chest: Effort normal. He has no decreased breath sounds. He has no wheezes. He exhibits no deformity.   Musculoskeletal:   Normal gait. No decreased range of motion of major joints.   Neurological: He is not disoriented.   Skin: Skin is intact. Capillary refill takes less than 2 seconds.   Psychiatric: His speech is normal and behavior is normal. His mood appears not anxious. He does not exhibit a depressed mood.       Lab Visit on 04/24/2018   Component Date Value Ref Range Status    WBC 04/24/2018 4.89  3.90 - 12.70 K/uL Final    RBC 04/24/2018 4.70  4.60 - 6.20 M/uL Final    Hemoglobin 04/24/2018 14.0  14.0 - 18.0 g/dL Final    Hematocrit 04/24/2018 40.1  40.0 - 54.0 % Final    MCV 04/24/2018 85  82 - 98 fL Final    MCH 04/24/2018 29.8  27.0 - 31.0 pg Final    MCHC 04/24/2018 34.9  32.0 - 36.0 g/dL Final    RDW 04/24/2018 13.6  11.5 - 14.5 % Final    Platelets 04/24/2018 241  150 - 350 " K/uL Final    MPV 04/24/2018 9.5  9.2 - 12.9 fL Final    Gran # (ANC) 04/24/2018 1.7* 1.8 - 7.7 K/uL Final    Lymph # 04/24/2018 2.4  1.0 - 4.8 K/uL Final    Mono # 04/24/2018 0.6  0.3 - 1.0 K/uL Final    Eos # 04/24/2018 0.2  0.0 - 0.5 K/uL Final    Baso # 04/24/2018 0.01  0.00 - 0.20 K/uL Final    Gran% 04/24/2018 34.1* 38.0 - 73.0 % Final    Lymph% 04/24/2018 49.5* 18.0 - 48.0 % Final    Mono% 04/24/2018 12.3  4.0 - 15.0 % Final    Eosinophil% 04/24/2018 3.7  0.0 - 8.0 % Final    Basophil% 04/24/2018 0.2  0.0 - 1.9 % Final    Differential Method 04/24/2018 Automated   Final    Sodium 04/24/2018 139  136 - 145 mmol/L Final    Potassium 04/24/2018 3.7  3.5 - 5.1 mmol/L Final    Chloride 04/24/2018 103  95 - 110 mmol/L Final    CO2 04/24/2018 30* 23 - 29 mmol/L Final    Glucose 04/24/2018 103  70 - 110 mg/dL Final    BUN, Bld 04/24/2018 13  6 - 20 mg/dL Final    Creatinine 04/24/2018 1.0  0.5 - 1.4 mg/dL Final    Calcium 04/24/2018 9.6  8.7 - 10.5 mg/dL Final    Total Protein 04/24/2018 7.7  6.0 - 8.4 g/dL Final    Albumin 04/24/2018 3.9  3.5 - 5.2 g/dL Final    Total Bilirubin 04/24/2018 0.6  0.1 - 1.0 mg/dL Final    Alkaline Phosphatase 04/24/2018 63  55 - 135 U/L Final    AST 04/24/2018 32  10 - 40 U/L Final    ALT 04/24/2018 39  10 - 44 U/L Final    Anion Gap 04/24/2018 6* 8 - 16 mmol/L Final    eGFR if  04/24/2018 >60  >60 mL/min/1.73 m^2 Final    eGFR if non African American 04/24/2018 >60  >60 mL/min/1.73 m^2 Final    Cholesterol 04/24/2018 170  120 - 199 mg/dL Final    Triglycerides 04/24/2018 164* 30 - 150 mg/dL Final    HDL 04/24/2018 46  40 - 75 mg/dL Final    LDL Cholesterol 04/24/2018 91.2  63.0 - 159.0 mg/dL Final    HDL/Chol Ratio 04/24/2018 27.1  20.0 - 50.0 % Final    Total Cholesterol/HDL Ratio 04/24/2018 3.7  2.0 - 5.0 Final    Non-HDL Cholesterol 04/24/2018 124  mg/dL Final    CPK 04/24/2018 685* 20 - 200 U/L Final    Hemoglobin A1C 04/24/2018  5.7* 4.0 - 5.6 % Final    Estimated Avg Glucose 04/24/2018 117  68 - 131 mg/dL Final   ]    Assessment:    1. Chest pain, unspecified type    2. Abnormal EKG    3. Acute non-recurrent pansinusitis          Layla was seen today for sinusitis and chest pain.    Diagnoses and all orders for this visit:    Chest pain, unspecified type  -     IN OFFICE EKG 12-LEAD (to [place]); Future  -     Ambulatory referral to Cardiology  - new problem.  Patient with multiple cardiovascular risk factors.  EKG is abnormal with no further cardiac testing for comparison since 2013.  - appearance of new right bundle branch block noted on EKG.  Refer to Cardiology for evaluation.      Abnormal EKG  -     Ambulatory referral to Cardiology  - new problem.  See plan above.    Acute non-recurrent pansinusitis   -new problem.  Patient with early signs of allergy mediated sinusitis.  Patient instructed on use of nasal steroid, continued use of oral antihistamine, nasal saline washes.   -if no improvement or worsening after improvement patient should contact the clinic for further instructions.        FOLLOW UP: Follow-up if symptoms worsen or fail to improve.

## 2018-05-28 ENCOUNTER — PATIENT MESSAGE (OUTPATIENT)
Dept: FAMILY MEDICINE | Facility: CLINIC | Age: 43
End: 2018-05-28

## 2018-06-05 ENCOUNTER — TELEPHONE (OUTPATIENT)
Dept: SLEEP MEDICINE | Facility: HOSPITAL | Age: 43
End: 2018-06-05

## 2018-06-05 ENCOUNTER — TELEPHONE (OUTPATIENT)
Dept: FAMILY MEDICINE | Facility: CLINIC | Age: 43
End: 2018-06-05

## 2018-06-05 NOTE — LETTER
June 5, 2018    Layla Siegel  [address]  [place] [address]             [facility]  [address]  [place] [address]  Phone: [phone]  Fax: [phone] Dear Mr. Siegel:    Sorry we were unable to contact you to schedule your Cardiology appointment. Please give the referral department a call at [phone].      If you have any questions or concerns, please don't hesitate to call.    Sincerely,        Melanie Michael MA

## 2018-06-29 ENCOUNTER — TELEPHONE (OUTPATIENT)
Dept: SLEEP MEDICINE | Facility: HOSPITAL | Age: 43
End: 2018-06-29

## 2018-07-02 ENCOUNTER — TELEPHONE (OUTPATIENT)
Dept: SLEEP MEDICINE | Facility: HOSPITAL | Age: 43
End: 2018-07-02

## 2018-08-14 ENCOUNTER — TELEPHONE (OUTPATIENT)
Dept: SLEEP MEDICINE | Facility: HOSPITAL | Age: 43
End: 2018-08-14

## 2018-08-22 ENCOUNTER — OFFICE VISIT (OUTPATIENT)
Dept: FAMILY MEDICINE | Facility: CLINIC | Age: 43
End: 2018-08-22
Payer: COMMERCIAL

## 2018-08-22 VITALS
TEMPERATURE: 98 F | RESPIRATION RATE: 17 BRPM | WEIGHT: 286.63 LBS | HEART RATE: 70 BPM | DIASTOLIC BLOOD PRESSURE: 96 MMHG | SYSTOLIC BLOOD PRESSURE: 142 MMHG | HEIGHT: 73 IN | BODY MASS INDEX: 37.99 KG/M2 | OXYGEN SATURATION: 97 %

## 2018-08-22 DIAGNOSIS — I10 ESSENTIAL HYPERTENSION: ICD-10-CM

## 2018-08-22 DIAGNOSIS — R73.03 PREDIABETES: ICD-10-CM

## 2018-08-22 DIAGNOSIS — E78.5 HYPERLIPIDEMIA LDL GOAL <130: ICD-10-CM

## 2018-08-22 DIAGNOSIS — I10 BENIGN ESSENTIAL HTN: Primary | ICD-10-CM

## 2018-08-22 PROCEDURE — 3080F DIAST BP >= 90 MM HG: CPT | Mod: CPTII,S$GLB,, | Performed by: INTERNAL MEDICINE

## 2018-08-22 PROCEDURE — 3008F BODY MASS INDEX DOCD: CPT | Mod: CPTII,S$GLB,, | Performed by: INTERNAL MEDICINE

## 2018-08-22 PROCEDURE — 99999 PR PBB SHADOW E&M-EST. PATIENT-LVL III: CPT | Mod: PBBFAC,,, | Performed by: INTERNAL MEDICINE

## 2018-08-22 PROCEDURE — 99214 OFFICE O/P EST MOD 30 MIN: CPT | Mod: S$GLB,,, | Performed by: INTERNAL MEDICINE

## 2018-08-22 PROCEDURE — 3077F SYST BP >= 140 MM HG: CPT | Mod: CPTII,S$GLB,, | Performed by: INTERNAL MEDICINE

## 2018-08-22 RX ORDER — PRAVASTATIN SODIUM 40 MG/1
20 TABLET ORAL DAILY
Qty: 45 TABLET | Refills: 3 | Status: SHIPPED | OUTPATIENT
Start: 2018-08-22 | End: 2018-08-29 | Stop reason: SINTOL

## 2018-08-22 RX ORDER — FLUOCINOLONE ACETONIDE 0.1 MG/ML
SOLUTION TOPICAL
COMMUNITY
Start: 2018-06-05 | End: 2020-07-14

## 2018-08-22 RX ORDER — KETOCONAZOLE 2 G/100G
AEROSOL, FOAM TOPICAL
COMMUNITY
Start: 2018-06-05 | End: 2020-07-14

## 2018-08-22 RX ORDER — AMLODIPINE BESYLATE 10 MG/1
10 TABLET ORAL DAILY
Qty: 90 TABLET | Refills: 1 | Status: SHIPPED | OUTPATIENT
Start: 2018-08-22 | End: 2019-02-25 | Stop reason: SDUPTHER

## 2018-08-22 NOTE — PROGRESS NOTES
"Subjective:       Patient ID: Layla Siegel is a 42 y.o. male.    Chief Complaint: Diabetes; Hypertension; and Follow-up    F/u chronic condtions    HPI: 43 y/o w/ HTN HLD morbid obesity presents for scheduled follow up. Primary concern is loose stools. He stopped metformin because this exacerbated but continues to have three to four loose stools per day primarily after eating no bloating or abdominal pain no blood or melena noted. Denies nausea or vomitting. Not checking blood pressure regularlly. He did decrease pravastatin due to muscle soreness and this has resolved weight is unchaged      Review of Systems   Constitutional: Negative for activity change, fever and unexpected weight change.   HENT: Negative for congestion, rhinorrhea, sore throat and trouble swallowing.    Eyes: Negative for photophobia and redness.   Respiratory: Negative for cough, chest tightness, shortness of breath and wheezing.    Cardiovascular: Negative for chest pain, palpitations and leg swelling.   Gastrointestinal: Negative for abdominal pain, blood in stool, constipation, diarrhea, nausea and vomiting.   Endocrine: Negative for cold intolerance, heat intolerance and polyuria.   Genitourinary: Negative for decreased urine volume, difficulty urinating, dysuria and urgency.   Musculoskeletal: Negative for arthralgias and back pain.   Skin: Negative for rash.   Neurological: Negative for dizziness, syncope, weakness and headaches.   Psychiatric/Behavioral: Negative for dysphoric mood, sleep disturbance and suicidal ideas.       Objective:     Vitals:    08/22/18 1135 08/22/18 1152   BP: (!) 140/98 (!) 142/96   BP Location: Left arm    Patient Position: Sitting    BP Method: Large (Manual)    Pulse: 70    Resp: 17    Temp: 98.1 °F (36.7 °C)    TempSrc: Oral    SpO2: 97%    Weight: 130 kg (286 lb 9.6 oz)    Height: 6' 1" (1.854 m)           Physical Exam   Constitutional: He is oriented to person, place, and time. He appears well-developed " and well-nourished.   HENT:   Head: Normocephalic and atraumatic.   Eyes: Conjunctivae are normal. Pupils are equal, round, and reactive to light. No scleral icterus.   Neck: Normal range of motion.   Cardiovascular: Normal rate and regular rhythm. Exam reveals no gallop and no friction rub.   No murmur heard.  Pulmonary/Chest: Effort normal and breath sounds normal. He has no wheezes. He has no rales.   Abdominal: Soft. Bowel sounds are normal. There is no tenderness. There is no rebound and no guarding.   Musculoskeletal: Normal range of motion. He exhibits no edema or tenderness.   No tenderness to compression of bilateral shoulders or thighs   Neurological: He is alert and oriented to person, place, and time. No cranial nerve deficit.   Skin: Skin is warm and dry.   Psychiatric: He has a normal mood and affect.       Assessment and Plan   1. Benign essential HTN  incrase amlodipine to 10mg daily labs next week with blood presssure cehcke  - Comprehensive metabolic panel; Future  - CBC auto differential; Future    2. Hyperlipidemia LDL goal <130  Repeat fasting lipids and ck in light of previous elevation  - Lipid panel; Future  - CK; Future  - pravastatin (PRAVACHOL) 40 MG tablet; Take 0.5 tablets (20 mg total) by mouth once daily.  Dispense: 45 tablet; Refill: 3    3. Prediabetes  Off metformin repeat a1c  - Hemoglobin A1c; Future  - Comprehensive metabolic panel; Future    4. Essential hypertension  As abvoe, home sleep study scheduled for next month to evaluate for CARMITA  - amLODIPine (NORVASC) 10 MG tablet; Take 1 tablet (10 mg total) by mouth once daily.  Dispense: 90 tablet; Refill: 1

## 2018-08-29 ENCOUNTER — PATIENT MESSAGE (OUTPATIENT)
Dept: FAMILY MEDICINE | Facility: CLINIC | Age: 43
End: 2018-08-29

## 2018-08-29 ENCOUNTER — LAB VISIT (OUTPATIENT)
Dept: LAB | Facility: HOSPITAL | Age: 43
End: 2018-08-29
Attending: INTERNAL MEDICINE
Payer: COMMERCIAL

## 2018-08-29 ENCOUNTER — CLINICAL SUPPORT (OUTPATIENT)
Dept: FAMILY MEDICINE | Facility: CLINIC | Age: 43
End: 2018-08-29
Payer: COMMERCIAL

## 2018-08-29 ENCOUNTER — TELEPHONE (OUTPATIENT)
Dept: FAMILY MEDICINE | Facility: CLINIC | Age: 43
End: 2018-08-29

## 2018-08-29 VITALS — DIASTOLIC BLOOD PRESSURE: 80 MMHG | SYSTOLIC BLOOD PRESSURE: 128 MMHG | HEART RATE: 72 BPM

## 2018-08-29 DIAGNOSIS — E78.5 HYPERLIPIDEMIA LDL GOAL <130: ICD-10-CM

## 2018-08-29 DIAGNOSIS — Z01.30 BLOOD PRESSURE CHECK: Primary | ICD-10-CM

## 2018-08-29 DIAGNOSIS — R73.03 PREDIABETES: ICD-10-CM

## 2018-08-29 DIAGNOSIS — R74.8 ELEVATED CPK: ICD-10-CM

## 2018-08-29 DIAGNOSIS — I10 BENIGN ESSENTIAL HTN: ICD-10-CM

## 2018-08-29 LAB
ALBUMIN SERPL BCP-MCNC: 4.1 G/DL
ALP SERPL-CCNC: 81 U/L
ALT SERPL W/O P-5'-P-CCNC: 44 U/L
ANION GAP SERPL CALC-SCNC: 10 MMOL/L
AST SERPL-CCNC: 41 U/L
BASOPHILS # BLD AUTO: 0.02 K/UL
BASOPHILS NFR BLD: 0.4 %
BILIRUB SERPL-MCNC: 0.5 MG/DL
BUN SERPL-MCNC: 15 MG/DL
CALCIUM SERPL-MCNC: 9.7 MG/DL
CHLORIDE SERPL-SCNC: 103 MMOL/L
CHOLEST SERPL-MCNC: 205 MG/DL
CHOLEST/HDLC SERPL: 4.2 {RATIO}
CK SERPL-CCNC: 1309 U/L
CO2 SERPL-SCNC: 28 MMOL/L
CREAT SERPL-MCNC: 1.2 MG/DL
DIFFERENTIAL METHOD: ABNORMAL
EOSINOPHIL # BLD AUTO: 0.1 K/UL
EOSINOPHIL NFR BLD: 2.2 %
ERYTHROCYTE [DISTWIDTH] IN BLOOD BY AUTOMATED COUNT: 13.3 %
EST. GFR  (AFRICAN AMERICAN): >60 ML/MIN/1.73 M^2
EST. GFR  (NON AFRICAN AMERICAN): >60 ML/MIN/1.73 M^2
ESTIMATED AVG GLUCOSE: 123 MG/DL
GLUCOSE SERPL-MCNC: 117 MG/DL
HBA1C MFR BLD HPLC: 5.9 %
HCT VFR BLD AUTO: 39.9 %
HDLC SERPL-MCNC: 49 MG/DL
HDLC SERPL: 23.9 %
HGB BLD-MCNC: 14.2 G/DL
LDLC SERPL CALC-MCNC: 122.4 MG/DL
LYMPHOCYTES # BLD AUTO: 2.4 K/UL
LYMPHOCYTES NFR BLD: 45.2 %
MCH RBC QN AUTO: 30.2 PG
MCHC RBC AUTO-ENTMCNC: 35.6 G/DL
MCV RBC AUTO: 85 FL
MONOCYTES # BLD AUTO: 0.5 K/UL
MONOCYTES NFR BLD: 9.2 %
NEUTROPHILS # BLD AUTO: 2.3 K/UL
NEUTROPHILS NFR BLD: 42.8 %
NONHDLC SERPL-MCNC: 156 MG/DL
PLATELET # BLD AUTO: 255 K/UL
PMV BLD AUTO: 9.3 FL
POTASSIUM SERPL-SCNC: 3.4 MMOL/L
PROT SERPL-MCNC: 8.2 G/DL
RBC # BLD AUTO: 4.7 M/UL
SODIUM SERPL-SCNC: 141 MMOL/L
TRIGL SERPL-MCNC: 168 MG/DL
WBC # BLD AUTO: 5.35 K/UL

## 2018-08-29 PROCEDURE — 83036 HEMOGLOBIN GLYCOSYLATED A1C: CPT

## 2018-08-29 PROCEDURE — 99999 PR PBB SHADOW E&M-EST. PATIENT-LVL II: CPT | Mod: PBBFAC,,,

## 2018-08-29 PROCEDURE — 80061 LIPID PANEL: CPT

## 2018-08-29 PROCEDURE — 82550 ASSAY OF CK (CPK): CPT

## 2018-08-29 PROCEDURE — 80053 COMPREHEN METABOLIC PANEL: CPT

## 2018-08-29 PROCEDURE — 85025 COMPLETE CBC W/AUTO DIFF WBC: CPT

## 2018-08-29 PROCEDURE — 36415 COLL VENOUS BLD VENIPUNCTURE: CPT | Mod: PN

## 2018-08-29 NOTE — TELEPHONE ENCOUNTER
Patient informed to stop pravastatin and repeat labs in one month and he expressed understanding and agreed to schedule labs

## 2018-08-29 NOTE — PROGRESS NOTES
Layla Siegel 42 y.o. male is here today for Blood Pressure check.     History of HTN yes.    Review of patient's allergies indicates:  No Known Allergies    Creatinine   Date Value Ref Range Status   04/24/2018 1.0 0.5 - 1.4 mg/dL Final     Sodium   Date Value Ref Range Status   04/24/2018 139 136 - 145 mmol/L Final     Potassium   Date Value Ref Range Status   04/24/2018 3.7 3.5 - 5.1 mmol/L Final   ]  Patient verifies taking blood pressure medications on a regular basis at the same time of the day @ 9 AM    Current Outpatient Medications:     amLODIPine (NORVASC) 10 MG tablet, Take 1 tablet (10 mg total) by mouth once daily., Disp: 90 tablet, Rfl: 1    losartan-hydrochlorothiazide 100-25 mg (HYZAAR) 100-25 mg per tablet, Take 1 tablet by mouth once daily., Disp: 90 tablet, Rfl: 3    pravastatin (PRAVACHOL) 40 MG tablet, Take 0.5 tablets (20 mg total) by mouth once daily., Disp: 45 tablet, Rfl: 3    fluocinolone (SYNALAR) 0.01 % external solution, , Disp: , Rfl:     ketoconazole 2 % Foam, , Disp: , Rfl:     levocetirizine (XYZAL) 5 MG tablet, Take 1 tablet (5 mg total) by mouth every evening., Disp: 90 tablet, Rfl: 3     Does patient have record of home blood pressure readings no. Readings have been averaging - N/A    Last dose of blood pressure medication was taken at 7 AM this morning  .  Patient is asymptomatic.     Complains of - no complaints.    BP: 128/80 , Pulse: 72 .    Blood pressure reading after 15 minutes was not taken    Dr. Rose notified.

## 2018-10-01 ENCOUNTER — LAB VISIT (OUTPATIENT)
Dept: LAB | Facility: HOSPITAL | Age: 43
End: 2018-10-01
Attending: INTERNAL MEDICINE
Payer: COMMERCIAL

## 2018-10-01 DIAGNOSIS — R74.8 ELEVATED CPK: ICD-10-CM

## 2018-10-01 LAB — CK SERPL-CCNC: 296 U/L

## 2018-10-01 PROCEDURE — 82550 ASSAY OF CK (CPK): CPT

## 2018-10-01 PROCEDURE — 36415 COLL VENOUS BLD VENIPUNCTURE: CPT | Mod: PN

## 2019-02-25 DIAGNOSIS — I10 ESSENTIAL HYPERTENSION: ICD-10-CM

## 2019-02-25 RX ORDER — AMLODIPINE BESYLATE 10 MG/1
TABLET ORAL
Qty: 90 TABLET | Refills: 1 | Status: SHIPPED | OUTPATIENT
Start: 2019-02-25 | End: 2019-07-22 | Stop reason: SDUPTHER

## 2019-03-30 ENCOUNTER — HOSPITAL ENCOUNTER (EMERGENCY)
Facility: HOSPITAL | Age: 44
Discharge: HOME | End: 2019-03-31
Attending: EMERGENCY MEDICINE
Payer: COMMERCIAL

## 2019-03-30 DIAGNOSIS — M79.606 LEG PAIN: Primary | ICD-10-CM

## 2019-03-30 PROCEDURE — 99284 EMERGENCY DEPT VISIT MOD MDM: CPT

## 2019-03-31 VITALS
HEIGHT: 73 IN | BODY MASS INDEX: 38.57 KG/M2 | HEART RATE: 69 BPM | OXYGEN SATURATION: 96 % | DIASTOLIC BLOOD PRESSURE: 81 MMHG | RESPIRATION RATE: 18 BRPM | SYSTOLIC BLOOD PRESSURE: 126 MMHG | WEIGHT: 291 LBS | TEMPERATURE: 98 F

## 2019-03-31 RX ORDER — METHOCARBAMOL 500 MG/1
1000 TABLET, FILM COATED ORAL 3 TIMES DAILY
Qty: 20 TABLET | Refills: 0 | Status: SHIPPED | OUTPATIENT
Start: 2019-03-31 | End: 2019-04-05

## 2019-03-31 RX ORDER — NAPROXEN 500 MG/1
500 TABLET ORAL 2 TIMES DAILY WITH MEALS
Qty: 28 TABLET | Refills: 0 | Status: SHIPPED | OUTPATIENT
Start: 2019-03-31 | End: 2019-04-14

## 2019-03-31 NOTE — DISCHARGE INSTRUCTIONS
Please return to the ED for any new or worsening symptoms: chest pain, shortness of breath, loss of consciousness or any other concerns. Please follow up with primary care within in the week. You may also call [phone] for the [facility] same day appointment line.

## 2019-03-31 NOTE — ED PROVIDER NOTES
Encounter Date: 3/30/2019       History     Chief Complaint   Patient presents with    Numbness     Pt c/o right leg (thigh) numbness that started today and pain that started 1 week ago.  Pt reports traveling to [place] within the past two weeks.  Denies SOB.  Reports hx of HTN.       Chief complaint:  Right leg pain    HPI:  This is a 43-year-old male with hypertension who presents to the ED with complaints of acute, moderate pain to the right leg, behind the knee that began 4 days ago.  He reports associated numbness to the anterior aspect of the thigh.  Patiently recently returned from the [place].  Patient states he is concerned about a blood clot.  He reports associated, mild swelling. No attempted treatment or alleviating factors.    The history is provided by the patient.     Review of patient's allergies indicates:  No Known Allergies  Past Medical History:   Diagnosis Date    High cholesterol     Hypertension      Past Surgical History:   Procedure Laterality Date    CHOLECYSTECTOMY  08/2017    CHOLECYSTECTOMY-LAPAROSCOPIC N/A 8/31/2017    [facility]    COLONOSCOPY N/A 12/15/2015    Performed by Horace Bella MD at [facility] ENDO     Family History   Problem Relation Age of Onset    Hypertension Mother     Transient ischemic attack Mother     Heart disease Maternal Grandfather     Heart disease Paternal Grandfather     Coronary artery disease Father         s/p cabg    Hypertension Brother      Social History     Tobacco Use    Smoking status: Never Smoker    Smokeless tobacco: Never Used   Substance Use Topics    Alcohol use: Yes     Comment: occasional, once a month 1-2 mixed vodka drink    Drug use: No     Review of Systems   Constitutional: Negative for fever.   HENT: Negative for sore throat.    Respiratory: Negative for cough and shortness of breath.    Cardiovascular: Positive for leg swelling. Negative for chest pain.   Gastrointestinal: Negative  for nausea and vomiting.   Genitourinary: Negative for dysuria.   Musculoskeletal: Negative for back pain.        Right leg pain   Skin: Negative for rash.   Neurological: Negative for dizziness, seizures, weakness, light-headedness, numbness and headaches.   Hematological: Does not bruise/bleed easily.       Physical Exam     Initial Vitals [03/30/19 2256]   BP Pulse Resp Temp SpO2   (!) 142/87 81 18 98.8 °F (37.1 °C) 96 %      MAP       --         Physical Exam    Constitutional: Vital signs are normal. He appears well-developed and well-nourished.  Non-toxic appearance.   Eyes: EOM are normal.   Neck: Full passive range of motion without pain. Neck supple. No neck rigidity.   Cardiovascular: Normal rate, S1 normal, S2 normal and normal heart sounds. Exam reveals no gallop.    No murmur heard.  Pulmonary/Chest: Effort normal and breath sounds normal. No tachypnea. He has no decreased breath sounds. He has no wheezes. He has no rhonchi. He has no rales.   Musculoskeletal:   No tenderness behind the right knee.  Mild bilateral lower extremity swelling without pitting edema.   Neurological: He is alert and oriented to person, place, and time. GCS eye subscore is 4. GCS verbal subscore is 5. GCS motor subscore is 6.   Skin: Skin is warm, dry and intact. No rash noted.   Psychiatric: He has a normal mood and affect.         ED Course   Procedures  Labs Reviewed - No data to display       Imaging Results          US Lower Extremity Veins Right (Final result)  Result time 03/31/19 01:09:22    Final result by Jose L Swift MD (03/31/19 01:09:22)                 Impression:      No evidence of right lower extremity deep venous thrombosis.      Electronically signed by: Jose L Swift MD  Date:    03/31/2019  Time:    01:09             Narrative:    EXAMINATION:  US LOWER EXTREMITY VEINS RIGHT    CLINICAL HISTORY:  Pain in leg, unspecified    TECHNIQUE:  Duplex and color flow Doppler evaluation of the right lower  extremity veins was performed.    COMPARISON:  None    FINDINGS:  No evidence of clot involving the right common femoral, greater saphenous, femoral, popliteal, peroneal, anterior and posterior tibial veins.  All venous structures demonstrate normal respiratory phasicity and augment adequately.  No evidence of soft tissue mass or Baker's cyst.                                 Medical Decision Making:   ED Management:  This is a 43-year-old male who presents to the ED with complaints of right leg pain with associated numbness.  He is afebrile and well-appearing.  Patient recently traveled out of the country and is concerned about a DVT.  Exam unremarkable.  Ultrasound negative for DVT.  Likely radicular pain.  Discharged home with prescription for naproxen and Robaxin.  Instructions given for supportive care and follow-up.  Return precautions given.                      Clinical Impression:       ICD-10-CM ICD-9-CM   1. Leg pain M79.606 729.5                                Shahla Petersen NP  03/31/19 0143

## 2019-04-01 ENCOUNTER — TELEPHONE (OUTPATIENT)
Dept: FAMILY MEDICINE | Facility: CLINIC | Age: 44
End: 2019-04-01

## 2019-04-01 NOTE — TELEPHONE ENCOUNTER
Attempted to contact patient. No answer, LVM. Patient will not be able to follow Dr. Rodriguez, she is not giving patient's her information. No further questions or concerns.

## 2019-04-01 NOTE — TELEPHONE ENCOUNTER
----- Message from Eloisa Herrmann sent at 4/1/2019  8:39 AM CDT -----  Contact: self - [phone]  Pt asking for a call back to speak to Dr. Rodriguez to ask if he can follow her.

## 2019-04-02 DIAGNOSIS — I10 ESSENTIAL HYPERTENSION: ICD-10-CM

## 2019-04-02 RX ORDER — LOSARTAN POTASSIUM AND HYDROCHLOROTHIAZIDE 25; 100 MG/1; MG/1
TABLET ORAL
Qty: 90 TABLET | Refills: 3 | Status: SHIPPED | OUTPATIENT
Start: 2019-04-02 | End: 2020-03-27

## 2019-04-16 ENCOUNTER — PATIENT OUTREACH (OUTPATIENT)
Dept: ADMINISTRATIVE | Facility: HOSPITAL | Age: 44
End: 2019-04-16

## 2019-04-30 ENCOUNTER — OFFICE VISIT (OUTPATIENT)
Dept: FAMILY MEDICINE | Facility: CLINIC | Age: 44
End: 2019-04-30
Payer: COMMERCIAL

## 2019-04-30 ENCOUNTER — LAB VISIT (OUTPATIENT)
Dept: LAB | Facility: HOSPITAL | Age: 44
End: 2019-04-30
Payer: COMMERCIAL

## 2019-04-30 VITALS
DIASTOLIC BLOOD PRESSURE: 90 MMHG | BODY MASS INDEX: 39.16 KG/M2 | WEIGHT: 295.44 LBS | HEART RATE: 75 BPM | OXYGEN SATURATION: 97 % | SYSTOLIC BLOOD PRESSURE: 103 MMHG | TEMPERATURE: 98 F | HEIGHT: 73 IN | RESPIRATION RATE: 17 BRPM

## 2019-04-30 DIAGNOSIS — R10.11 RUQ ABDOMINAL PAIN: ICD-10-CM

## 2019-04-30 DIAGNOSIS — R74.8 ELEVATED CK: ICD-10-CM

## 2019-04-30 DIAGNOSIS — R73.03 PREDIABETES: ICD-10-CM

## 2019-04-30 DIAGNOSIS — I10 BENIGN ESSENTIAL HTN: ICD-10-CM

## 2019-04-30 DIAGNOSIS — R73.03 PREDIABETES: Primary | ICD-10-CM

## 2019-04-30 DIAGNOSIS — E78.5 HYPERLIPIDEMIA, UNSPECIFIED HYPERLIPIDEMIA TYPE: ICD-10-CM

## 2019-04-30 DIAGNOSIS — G25.81 RLS (RESTLESS LEGS SYNDROME): ICD-10-CM

## 2019-04-30 DIAGNOSIS — R74.8 ELEVATED CPK: ICD-10-CM

## 2019-04-30 LAB
ALBUMIN SERPL BCP-MCNC: 3.9 G/DL (ref 3.5–5.2)
ALP SERPL-CCNC: 68 U/L (ref 55–135)
ALT SERPL W/O P-5'-P-CCNC: 37 U/L (ref 10–44)
ANION GAP SERPL CALC-SCNC: 7 MMOL/L (ref 8–16)
ANION GAP SERPL CALC-SCNC: 7 MMOL/L (ref 8–16)
AST SERPL-CCNC: 24 U/L (ref 10–40)
BASOPHILS # BLD AUTO: 0.01 K/UL (ref 0–0.2)
BASOPHILS NFR BLD: 0.2 % (ref 0–1.9)
BILIRUB SERPL-MCNC: 0.5 MG/DL (ref 0.1–1)
BUN SERPL-MCNC: 16 MG/DL (ref 6–20)
BUN SERPL-MCNC: 16 MG/DL (ref 6–20)
CALCIUM SERPL-MCNC: 9.9 MG/DL (ref 8.7–10.5)
CALCIUM SERPL-MCNC: 9.9 MG/DL (ref 8.7–10.5)
CHLORIDE SERPL-SCNC: 102 MMOL/L (ref 95–110)
CHLORIDE SERPL-SCNC: 102 MMOL/L (ref 95–110)
CHOLEST SERPL-MCNC: 253 MG/DL (ref 120–199)
CHOLEST/HDLC SERPL: 5.2 {RATIO} (ref 2–5)
CK SERPL-CCNC: 500 U/L (ref 20–200)
CO2 SERPL-SCNC: 30 MMOL/L (ref 23–29)
CO2 SERPL-SCNC: 30 MMOL/L (ref 23–29)
CREAT SERPL-MCNC: 1 MG/DL (ref 0.5–1.4)
CREAT SERPL-MCNC: 1 MG/DL (ref 0.5–1.4)
DIFFERENTIAL METHOD: ABNORMAL
EOSINOPHIL # BLD AUTO: 0.1 K/UL (ref 0–0.5)
EOSINOPHIL NFR BLD: 3.1 % (ref 0–8)
ERYTHROCYTE [DISTWIDTH] IN BLOOD BY AUTOMATED COUNT: 13.5 % (ref 11.5–14.5)
EST. GFR  (AFRICAN AMERICAN): >60 ML/MIN/1.73 M^2
EST. GFR  (AFRICAN AMERICAN): >60 ML/MIN/1.73 M^2
EST. GFR  (NON AFRICAN AMERICAN): >60 ML/MIN/1.73 M^2
EST. GFR  (NON AFRICAN AMERICAN): >60 ML/MIN/1.73 M^2
ESTIMATED AVG GLUCOSE: 126 MG/DL (ref 68–131)
FERRITIN SERPL-MCNC: 306 NG/ML (ref 20–300)
GLUCOSE SERPL-MCNC: 110 MG/DL (ref 70–110)
GLUCOSE SERPL-MCNC: 110 MG/DL (ref 70–110)
HBA1C MFR BLD HPLC: 6 % (ref 4–5.6)
HCT VFR BLD AUTO: 40.1 % (ref 40–54)
HDLC SERPL-MCNC: 49 MG/DL (ref 40–75)
HDLC SERPL: 19.4 % (ref 20–50)
HGB BLD-MCNC: 13.8 G/DL (ref 14–18)
LDLC SERPL CALC-MCNC: 163 MG/DL (ref 63–159)
LYMPHOCYTES # BLD AUTO: 2 K/UL (ref 1–4.8)
LYMPHOCYTES NFR BLD: 49.2 % (ref 18–48)
MCH RBC QN AUTO: 29.9 PG (ref 27–31)
MCHC RBC AUTO-ENTMCNC: 34.4 G/DL (ref 32–36)
MCV RBC AUTO: 87 FL (ref 82–98)
MONOCYTES # BLD AUTO: 0.5 K/UL (ref 0.3–1)
MONOCYTES NFR BLD: 11.1 % (ref 4–15)
NEUTROPHILS # BLD AUTO: 1.5 K/UL (ref 1.8–7.7)
NEUTROPHILS NFR BLD: 36.4 % (ref 38–73)
NONHDLC SERPL-MCNC: 204 MG/DL
PLATELET # BLD AUTO: 219 K/UL (ref 150–350)
PMV BLD AUTO: 9.3 FL (ref 9.2–12.9)
POTASSIUM SERPL-SCNC: 4 MMOL/L (ref 3.5–5.1)
POTASSIUM SERPL-SCNC: 4 MMOL/L (ref 3.5–5.1)
PROT SERPL-MCNC: 7.8 G/DL (ref 6–8.4)
RBC # BLD AUTO: 4.62 M/UL (ref 4.6–6.2)
SODIUM SERPL-SCNC: 139 MMOL/L (ref 136–145)
SODIUM SERPL-SCNC: 139 MMOL/L (ref 136–145)
TRIGL SERPL-MCNC: 205 MG/DL (ref 30–150)
WBC # BLD AUTO: 4.15 K/UL (ref 3.9–12.7)

## 2019-04-30 PROCEDURE — 99214 PR OFFICE/OUTPT VISIT, EST, LEVL IV, 30-39 MIN: ICD-10-PCS | Mod: S$GLB,,, | Performed by: INTERNAL MEDICINE

## 2019-04-30 PROCEDURE — 99999 PR PBB SHADOW E&M-EST. PATIENT-LVL V: ICD-10-PCS | Mod: PBBFAC,,, | Performed by: INTERNAL MEDICINE

## 2019-04-30 PROCEDURE — 99999 PR PBB SHADOW E&M-EST. PATIENT-LVL V: CPT | Mod: PBBFAC,,, | Performed by: INTERNAL MEDICINE

## 2019-04-30 PROCEDURE — 36415 COLL VENOUS BLD VENIPUNCTURE: CPT | Mod: PN

## 2019-04-30 PROCEDURE — 3008F PR BODY MASS INDEX (BMI) DOCUMENTED: ICD-10-PCS | Mod: CPTII,S$GLB,, | Performed by: INTERNAL MEDICINE

## 2019-04-30 PROCEDURE — 82085 ASSAY OF ALDOLASE: CPT

## 2019-04-30 PROCEDURE — 3074F SYST BP LT 130 MM HG: CPT | Mod: CPTII,S$GLB,, | Performed by: INTERNAL MEDICINE

## 2019-04-30 PROCEDURE — 80053 COMPREHEN METABOLIC PANEL: CPT

## 2019-04-30 PROCEDURE — 80061 LIPID PANEL: CPT

## 2019-04-30 PROCEDURE — 85025 COMPLETE CBC W/AUTO DIFF WBC: CPT

## 2019-04-30 PROCEDURE — 82728 ASSAY OF FERRITIN: CPT

## 2019-04-30 PROCEDURE — 83036 HEMOGLOBIN GLYCOSYLATED A1C: CPT

## 2019-04-30 PROCEDURE — 99214 OFFICE O/P EST MOD 30 MIN: CPT | Mod: S$GLB,,, | Performed by: INTERNAL MEDICINE

## 2019-04-30 PROCEDURE — 3074F PR MOST RECENT SYSTOLIC BLOOD PRESSURE < 130 MM HG: ICD-10-PCS | Mod: CPTII,S$GLB,, | Performed by: INTERNAL MEDICINE

## 2019-04-30 PROCEDURE — 3080F DIAST BP >= 90 MM HG: CPT | Mod: CPTII,S$GLB,, | Performed by: INTERNAL MEDICINE

## 2019-04-30 PROCEDURE — 3008F BODY MASS INDEX DOCD: CPT | Mod: CPTII,S$GLB,, | Performed by: INTERNAL MEDICINE

## 2019-04-30 PROCEDURE — 3080F PR MOST RECENT DIASTOLIC BLOOD PRESSURE >= 90 MM HG: ICD-10-PCS | Mod: CPTII,S$GLB,, | Performed by: INTERNAL MEDICINE

## 2019-04-30 PROCEDURE — 82550 ASSAY OF CK (CPK): CPT

## 2019-04-30 RX ORDER — METFORMIN HYDROCHLORIDE 500 MG/1
500 TABLET ORAL 2 TIMES DAILY WITH MEALS
Qty: 180 TABLET | Refills: 3 | Status: SHIPPED | OUTPATIENT
Start: 2019-04-30 | End: 2020-07-14

## 2019-04-30 NOTE — PROGRESS NOTES
"Subjective:       Patient ID: Layla Siegel is a 43 y.o. male.    Chief Complaint: Annual Exam and Establish Care    F/u chronic conditions    HPI: 42 y/o w/ HTN HLD statin intolerance (due to myalgia) metabolic syndrome. Last visit due to loose stools discontinued metformin. He reports no change within 20 minutes of eat has to move bowels no nausea no melena or BRBPR. He has history of cholecystecomy. No night time bowel movements no dysuria. Occasionally notes right upper abdominal pain worse when bending forward no chagne with PO intake. Has had colonoscopy in past for loose stool and rectal bleeding withtout significant abnormalities    Review of Systems   Constitutional: Negative for activity change, appetite change, fatigue, fever and unexpected weight change.   HENT: Negative for ear pain, rhinorrhea and sore throat.    Eyes: Negative for discharge and visual disturbance.   Respiratory: Negative for chest tightness, shortness of breath and wheezing.    Cardiovascular: Negative for chest pain, palpitations and leg swelling.   Gastrointestinal: Positive for abdominal distention and diarrhea. Negative for abdominal pain, anal bleeding, blood in stool and constipation.   Endocrine: Negative for cold intolerance and heat intolerance.   Genitourinary: Negative for dysuria and hematuria.   Musculoskeletal: Negative for joint swelling and neck stiffness.   Skin: Negative for rash.   Neurological: Negative for dizziness, syncope, weakness and headaches.   Psychiatric/Behavioral: Negative for suicidal ideas.       Objective:     Vitals:    04/30/19 0821   BP: (!) 103/90   BP Location: Left arm   Patient Position: Sitting   Pulse: 75   Resp: 17   Temp: 97.8 °F (36.6 °C)   TempSrc: Oral   SpO2: 97%   Weight: 134 kg (295 lb 6.7 oz)   Height: 6' 1" (1.854 m)          Physical Exam   Constitutional: He is oriented to person, place, and time. He appears well-developed and well-nourished.   HENT:   Head: Normocephalic and " atraumatic.   Eyes: Pupils are equal, round, and reactive to light. Conjunctivae are normal.   Neck: Normal range of motion.   Cardiovascular: Normal rate and regular rhythm. Exam reveals no gallop and no friction rub.   No murmur heard.  Pulmonary/Chest: Effort normal and breath sounds normal. He has no wheezes. He has no rales.   Abdominal: Soft. Bowel sounds are normal. There is no tenderness. There is no rebound and no guarding.   Musculoskeletal: Normal range of motion. He exhibits no edema or tenderness.   Neurological: He is alert and oriented to person, place, and time. No cranial nerve deficit.   Skin: Skin is warm and dry.   Psychiatric: He has a normal mood and affect.       Assessment and Plan   1. Prediabetes  No medications in light of morbid obesity and HTN would benefit from metformin therapy resume to take with food referral for DFE and DM education  - Hemoglobin A1c; Future  - metFORMIN (GLUCOPHAGE) 500 MG tablet; Take 1 tablet (500 mg total) by mouth 2 (two) times daily with meals.  Dispense: 180 tablet; Refill: 3  - Ambulatory Referral to Diabetes Education  - Ambulatory referral to Optometry    2. Benign essential HTN  At goal continue curretn medications  - CBC auto differential; Future    3. Elevated CK  Related to statin therapy now off will repeat no evidence of myalgia  - CK; Future  - Comprehensive metabolic panel; Future    4. Hyperlipidemia, unspecified hyperlipidemia type  Consider adding cholestrymine if no evidence of hepatic inflammation  - Comprehensive metabolic panel; Future  - Lipid panel; Future    5. RUQ abdominal pain  Check for liver steaosis with RUQ ultrasound LFT's as above  - US Abdomen Complete; Future

## 2019-05-01 ENCOUNTER — TELEPHONE (OUTPATIENT)
Dept: FAMILY MEDICINE | Facility: CLINIC | Age: 44
End: 2019-05-01

## 2019-05-01 DIAGNOSIS — R73.03 PREDIABETES: ICD-10-CM

## 2019-05-01 DIAGNOSIS — E78.5 HYPERLIPIDEMIA, UNSPECIFIED HYPERLIPIDEMIA TYPE: Primary | ICD-10-CM

## 2019-05-01 LAB — ALDOLASE SERPL-CCNC: 6 U/L (ref 1.2–7.6)

## 2019-05-01 RX ORDER — FENOFIBRATE 160 MG/1
160 TABLET ORAL DAILY
Qty: 90 TABLET | Refills: 3 | Status: SHIPPED | OUTPATIENT
Start: 2019-05-01 | End: 2020-07-14

## 2019-05-01 NOTE — TELEPHONE ENCOUNTER
Labs show a1c slightly up, as discussed yesterday should resume metformin (to take with food). In light of statin intolerance will start on fenofibrate to take once daily blood count, kidney and liver function are all normal will plan to repeat lipid prior to follow up July 30

## 2019-05-02 ENCOUNTER — HOSPITAL ENCOUNTER (OUTPATIENT)
Dept: RADIOLOGY | Facility: HOSPITAL | Age: 44
Discharge: HOME | End: 2019-05-02
Attending: INTERNAL MEDICINE
Payer: COMMERCIAL

## 2019-05-02 DIAGNOSIS — R10.11 RUQ ABDOMINAL PAIN: ICD-10-CM

## 2019-05-02 PROCEDURE — 76700 US EXAM ABDOM COMPLETE: CPT | Mod: TC

## 2019-05-02 PROCEDURE — 76700 US EXAM ABDOM COMPLETE: CPT | Mod: 26,,, | Performed by: RADIOLOGY

## 2019-05-02 PROCEDURE — 76700 US ABDOMEN COMPLETE: ICD-10-PCS | Mod: 26,,, | Performed by: RADIOLOGY

## 2019-05-16 ENCOUNTER — TELEPHONE (OUTPATIENT)
Dept: OPTOMETRY | Facility: CLINIC | Age: 44
End: 2019-05-16

## 2019-05-21 ENCOUNTER — CLINICAL SUPPORT (OUTPATIENT)
Dept: DIABETES | Facility: CLINIC | Age: 44
End: 2019-05-21
Payer: COMMERCIAL

## 2019-05-21 VITALS — BODY MASS INDEX: 38.37 KG/M2 | WEIGHT: 290.81 LBS

## 2019-05-21 DIAGNOSIS — R73.03 PREDIABETES: Primary | ICD-10-CM

## 2019-05-21 PROCEDURE — G0108 DIAB MANAGE TRN  PER INDIV: HCPCS | Mod: S$GLB,,, | Performed by: DIETITIAN, REGISTERED

## 2019-05-21 PROCEDURE — G0108 PR DIAB MANAGE TRN  PER INDIV: ICD-10-PCS | Mod: S$GLB,,, | Performed by: DIETITIAN, REGISTERED

## 2019-05-21 NOTE — PROGRESS NOTES
Diabetes Education  Author: Coretta Suazo RD  Date: 5/21/2019    Diabetes Care Management Summary  Diabetes Education Record Assessment/Progress: Initial  Current Diabetes Risk Level: Low   Diabetes Type  Diabetes Type : Pre-Diabetes  Diabetes History  Diabetes Diagnosis: 0-1 year  Current Treatment: Oral Medication, Diet    Health Maintenance was reviewed today with patient. Discussed with patient importance of routine eye exams, foot exams/foot care, blood work (i.e.: A1c, microalbumin, and lipid), dental visits, yearly flu vaccine, and pneumonia vaccine as indicated by PCP. Patient verbalized understanding.     Health Maintenance Topics with due status: Not Due       Topic Last Completion Date    TETANUS VACCINE 12/22/2016    Influenza Vaccine 01/23/2018    Lipid Panel 04/30/2019     There are no preventive care reminders to display for this patient.    Nutrition  Meal Planning: skipping meals, drinks regular soda, water, eats out often  What type of sweetener do you use?: sugar  What type of beverages do you drink?: regular soda/tea, sport drinks, water, juice  Meal Plan 24 Hour Recall - Breakfast: skips; drinks water all day while working  Meal Plan 24 Hour Recall - Lunch: skips; water or occ 1 regular coke  Meal Plan 24 Hour Recall - Dinner: 730-9pm: 2+ qts fruit juice (estimated in juice: 960 rubia, 240 gm carb),Malorie Double quarter pounder w med fries, reg soda ( estimated in meal: 1400 rubia, 61 g fat, 167 g carb 1600 mg na) TOTAL in even meal= 2400 rubia, 407 gm carb    Monitoring   Self Monitoring : no  Blood Glucose Logs: No  Do you use a personal continuous glucose monitor?: No    Exercise   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)    Current Diabetes Treatment   Current Treatment: Oral Medication, Diet    Social History  Preferred Learning Method: Face to Face  Primary Support: Spouse  Educational Level: High School  Occupation: self employed- auto detailing  Smoking Status: Never a Smoker  Alcohol  Use: Rarely  Barriers to Change: None  Learning Challenges : None  Readiness to Learn : Eager  Cultural Influences: No    Diabetes Education Assessment/Progress  Diabetes Disease Process (diabetes disease process and treatment options): Discussion, Written Materials Provided, Individual Session, Comprehends Key Points  Nutrition (Incorporating nutritional management into one's lifestyle): Discussion, Individual Session, Written Materials Provided, Comprehends Key Points  -diet recall indicates pt skips lunch and bkfst and consumes reg sugared beverages in large amounts throughout day in addition to lots of juice. Estimated cals in one meal 2400 rubia and over 400 g of carb. often evening meal is fast food which contains large amt of sodium and saturated fat. Diet recall also indicates large portion sizes in evening meals  -Discussed carb vs non-carb foods. Discussed appropriate amount of carbs to have at meals/snacks. Discussed appropriate serving sizes of individual carb items.   -Reviewed label reading, portion control (hand) and using the plate method of meal planning.  -Asked pt to eliminate reg sugared beverages and all juices from daily diet. Discussed using water enhancers such as Crystal light or SF propel, also Ok for [place] milk  -Rec'd pt consume 3 evenly spaced meals/day of no more than 60 grams of carb/meal. if pt not able to eat a full meal, suggessted try to replace meal with a 15-30 g carb snack    Physical Activity (incorporating physical activity into one's lifestyle): Discussion, Individual Session, Written Materials Provided, Comprehends Key Points  Medications (states correct name, dose, onset, peak, duration, side effects & timing of meds): Discussion, Written Materials Provided, Individual Session  -discussed timing of meds. Pt states due to GI issues, takes both metformin tabs in evening only    Monitoring (monitoring blood glucose/other parameters & using results): Discussion, Written  Materials Provided  Acute Complications (preventing, detecting, and treating acute complications): Discussion, Written Materials Provided  Chronic Complications (preventing, detecting, and treating chronic complications): Discussion, Written Materials Provided  Clinical (diabetes, other pertinent medical history, and relevant comorbidities reviewed during visit): Discussion, Written Materials Provided  Behavioral (readiness for change, lifestyle practices, self-care behaviors): Discussion    Goals  Patient has selected/evaluated goals during today's session: Yes, selected  Healthy Eating: Set(Eliminiate reg soda and juices from diet and consume 3 meals/day, limit to 60 g carb/meal)  Start Date: 05/21/19  Target Date: 06/21/19    Diabetes Care Plan/Intervention  Education Plan/Intervention: Individual Follow-Up DSMT(contact information provided)    Diabetes Meal Plan  Restrictions: Restricted Carbohydrate  Carbohydrate Per Meal: 45-60g, 60-75g  Carbohydrate Per Snack : 15-20g    Today's Self-Management Care Plan was developed with the patient's input and is based on barriers identified during today's assessment.    The long and short-term goals in the care plan were written with the patient/caregiver's input. The patient has agreed to work toward these goals to improve his overall diabetes control.      The patient received a copy of today's self-management plan and verbalized understanding of the care plan, goals, and all of today's instructions.      The patient was encouraged to communicate with his physician and care team regarding his condition(s) and treatment.  I provided the patient with my contact information today and encouraged him to contact me via phone or patient portal as needed.     Education Units of Time   Time Spent: 60 min

## 2019-06-17 ENCOUNTER — PATIENT MESSAGE (OUTPATIENT)
Dept: OPTOMETRY | Facility: CLINIC | Age: 44
End: 2019-06-17

## 2019-07-22 DIAGNOSIS — I10 ESSENTIAL HYPERTENSION: ICD-10-CM

## 2019-07-22 RX ORDER — AMLODIPINE BESYLATE 10 MG/1
TABLET ORAL
Qty: 90 TABLET | Refills: 1 | Status: SHIPPED | OUTPATIENT
Start: 2019-07-22 | End: 2020-07-02

## 2019-07-22 RX ORDER — AMLODIPINE BESYLATE 10 MG/1
10 TABLET ORAL DAILY
Qty: 90 TABLET | Refills: 1 | Status: SHIPPED | OUTPATIENT
Start: 2019-07-22 | End: 2019-07-22 | Stop reason: SDUPTHER

## 2019-07-22 NOTE — TELEPHONE ENCOUNTER
----- Message from Susan Salas sent at 7/22/2019  9:13 AM CDT -----  Contact: Layla [phone]  Type: RX Refill Request    Who Called: Layla     Refill or New Rx:Refill     RX Name and Strength:amLODIPine (NORVASC) 10 MG tablet    Is this a 30 day or 90 day RX:30 day     Preferred Pharmacy with phone number:[facility] [address]    Would the patient rather a call back or a response via [facility]? Call back    Best Call Back Number:[phone]

## 2019-08-22 ENCOUNTER — TELEPHONE (OUTPATIENT)
Dept: FAMILY MEDICINE | Facility: CLINIC | Age: 44
End: 2019-08-22

## 2019-11-07 ENCOUNTER — OFFICE VISIT (OUTPATIENT)
Dept: FAMILY MEDICINE | Facility: CLINIC | Age: 44
End: 2019-11-07
Payer: COMMERCIAL

## 2019-11-07 VITALS
WEIGHT: 292.31 LBS | SYSTOLIC BLOOD PRESSURE: 138 MMHG | HEIGHT: 73 IN | OXYGEN SATURATION: 97 % | TEMPERATURE: 99 F | RESPIRATION RATE: 16 BRPM | DIASTOLIC BLOOD PRESSURE: 90 MMHG | BODY MASS INDEX: 38.74 KG/M2 | HEART RATE: 74 BPM

## 2019-11-07 DIAGNOSIS — I10 BENIGN ESSENTIAL HTN: ICD-10-CM

## 2019-11-07 DIAGNOSIS — J06.9 VIRAL URI WITH COUGH: Primary | ICD-10-CM

## 2019-11-07 LAB
CTP QC/QA: YES
S PYO RRNA THROAT QL PROBE: NEGATIVE

## 2019-11-07 PROCEDURE — 3075F SYST BP GE 130 - 139MM HG: CPT | Mod: CPTII,S$GLB,, | Performed by: NURSE PRACTITIONER

## 2019-11-07 PROCEDURE — 3008F BODY MASS INDEX DOCD: CPT | Mod: CPTII,S$GLB,, | Performed by: NURSE PRACTITIONER

## 2019-11-07 PROCEDURE — 3008F PR BODY MASS INDEX (BMI) DOCUMENTED: ICD-10-PCS | Mod: CPTII,S$GLB,, | Performed by: NURSE PRACTITIONER

## 2019-11-07 PROCEDURE — 99999 PR PBB SHADOW E&M-EST. PATIENT-LVL III: ICD-10-PCS | Mod: PBBFAC,,, | Performed by: NURSE PRACTITIONER

## 2019-11-07 PROCEDURE — 99214 OFFICE O/P EST MOD 30 MIN: CPT | Mod: 25,S$GLB,, | Performed by: NURSE PRACTITIONER

## 2019-11-07 PROCEDURE — 87880 STREP A ASSAY W/OPTIC: CPT | Mod: QW,S$GLB,, | Performed by: NURSE PRACTITIONER

## 2019-11-07 PROCEDURE — 3080F DIAST BP >= 90 MM HG: CPT | Mod: CPTII,S$GLB,, | Performed by: NURSE PRACTITIONER

## 2019-11-07 PROCEDURE — 87880 POCT RAPID STREP A: ICD-10-PCS | Mod: QW,S$GLB,, | Performed by: NURSE PRACTITIONER

## 2019-11-07 PROCEDURE — 3080F PR MOST RECENT DIASTOLIC BLOOD PRESSURE >= 90 MM HG: ICD-10-PCS | Mod: CPTII,S$GLB,, | Performed by: NURSE PRACTITIONER

## 2019-11-07 PROCEDURE — 3075F PR MOST RECENT SYSTOLIC BLOOD PRESS GE 130-139MM HG: ICD-10-PCS | Mod: CPTII,S$GLB,, | Performed by: NURSE PRACTITIONER

## 2019-11-07 PROCEDURE — 99999 PR PBB SHADOW E&M-EST. PATIENT-LVL III: CPT | Mod: PBBFAC,,, | Performed by: NURSE PRACTITIONER

## 2019-11-07 PROCEDURE — 99214 PR OFFICE/OUTPT VISIT, EST, LEVL IV, 30-39 MIN: ICD-10-PCS | Mod: 25,S$GLB,, | Performed by: NURSE PRACTITIONER

## 2019-11-07 RX ORDER — BENZONATATE 100 MG/1
100 CAPSULE ORAL 3 TIMES DAILY PRN
Qty: 30 CAPSULE | Refills: 0 | Status: SHIPPED | OUTPATIENT
Start: 2019-11-07 | End: 2019-11-17

## 2019-11-07 RX ORDER — FLUTICASONE PROPIONATE 50 MCG
1 SPRAY, SUSPENSION (ML) NASAL DAILY
Qty: 15.8 ML | Refills: 0 | Status: SHIPPED | OUTPATIENT
Start: 2019-11-07 | End: 2020-07-14

## 2019-11-07 RX ORDER — DESLORATADINE 5 MG/1
5 TABLET ORAL DAILY
Qty: 30 TABLET | Refills: 11 | Status: SHIPPED | OUTPATIENT
Start: 2019-11-07 | End: 2022-12-20 | Stop reason: ALTCHOICE

## 2019-11-07 NOTE — PATIENT INSTRUCTIONS
Viral Upper Respiratory Illness (Adult)  You have a viral upper respiratory illness (URI), which is another term for the common cold. This illness is contagious during the first few days. It is spread through the air by coughing and sneezing. It may also be spread by direct contact (touching the sick person and then touching your own eyes, nose, or mouth). Frequent handwashing will decrease risk of spread. Most viral illnesses go away within 7 to 10 days with rest and simple home remedies. Sometimes the illness may last for several weeks. Antibiotics will not kill a virus, and they are generally not prescribed for this condition.    Home care  · If symptoms are severe, rest at home for the first 2 to 3 days. When you resume activity, don't let yourself get too tired.  · Avoid being exposed to cigarette smoke (yours or others).  · You may use acetaminophen or ibuprofen to control pain and fever, unless another medicine was prescribed. (Note: If you have chronic liver or kidney disease, have ever had a stomach ulcer or gastrointestinal bleeding, or are taking blood-thinning medicines, talk with your healthcare provider before using these medicines.) Aspirin should never be given to anyone under 18 years of age who is ill with a viral infection or fever. It may cause severe liver or brain damage.  · Your appetite may be poor, so a light diet is fine. Avoid dehydration by drinking 6 to 8 glasses of fluids per day (water, soft drinks, juices, tea, or soup). Extra fluids will help loosen secretions in the nose and lungs.  · Over-the-counter cold medicines will not shorten the length of time youre sick, but they may be helpful for the following symptoms: cough, sore throat, and nasal and sinus congestion. (Note: Do not use decongestants if you have high blood pressure.)  Follow-up care  Follow up with your healthcare provider, or as advised.  When to seek medical advice  Call your healthcare provider right away if any  of these occur:  · Cough with lots of colored sputum (mucus)  · Severe headache; face, neck, or ear pain  · Difficulty swallowing due to throat pain  · Fever of 100.4°F (38°C)  Call 911, or get immediate medical care  Call emergency services right away if any of these occur:  · Chest pain, shortness of breath, wheezing, or difficulty breathing  · Coughing up blood  · Inability to swallow due to throat pain  Date Last Reviewed: 9/13/2015  © 3921-1593 [employer]. [address] [place] [id number]. All rights reserved. This information is not intended as a substitute for professional medical care. Always follow your healthcare professional's instructions.

## 2019-11-07 NOTE — PROGRESS NOTES
Routine Office Visit    Patient Name: Layla Siegel    : 1975  MRN: [id number]    Chief Complaint:  Sore throat    Subjective:  Layla is a 44 y.o. male who presents today for:    1.  Sore throat - patient reports today for evaluation of a sore throat.  States that the sore throat started 5 days ago when he was in [place] and he states that he feels like the weather change there contributed to it.  Associated symptoms include hoarseness and spitting up cold as well as slight productive cough.  Denies any fevers or chills.  He has not gotten the flu shot and he denies any specific sick contacts.  Denies any chest pain, wheezing, shortness of breath, palpitations.  Denies any nausea, vomiting, diarrhea, or abdominal pain.  He has been taking daily Xyzal for the symptoms as well as Coricidin HBP and he states that his symptoms have slightly improved in the last day or so.  Patient is new to me and this is the extent of his concerns at this time.    Past Medical History  Past Medical History:   Diagnosis Date    High cholesterol     Hypertension        Past Surgical History  Past Surgical History:   Procedure Laterality Date    CHOLECYSTECTOMY  2017    COLONOSCOPY N/A 12/15/2015    Procedure: COLONOSCOPY;  Surgeon: Horace Bella MD;  Location: [facility];  Service: Endoscopy;  Laterality: N/A;       Family History  Family History   Problem Relation Age of Onset    Hypertension Mother     Transient ischemic attack Mother     Heart disease Maternal Grandfather     Heart disease Paternal Grandfather     Coronary artery disease Father         s/p cabg    Hypertension Brother        Social History  Social History     Socioeconomic History    Marital status:      Spouse name: Not on file    Number of children: Not on file    Years of education: Not on file    Highest education level: Not on file   Occupational History    Not on file   Social Needs    Financial resource strain: Not on  file    Food insecurity:     Worry: Not on file     Inability: Not on file    Transportation needs:     Medical: Not on file     Non-medical: Not on file   Tobacco Use    Smoking status: Never Smoker    Smokeless tobacco: Never Used   Substance and Sexual Activity    Alcohol use: Yes     Comment: occasional, once a month 1-2 mixed vodka drink    Drug use: No    Sexual activity: Yes     Partners: Female     Birth control/protection: None   Lifestyle    Physical activity:     Days per week: Not on file     Minutes per session: Not on file    Stress: Not on file   Relationships    Social connections:     Talks on phone: Not on file     Gets together: Not on file     Attends [employer] service: Not on file     Active member of club or organization: Not on file     Attends meetings of clubs or organizations: Not on file     Relationship status: Not on file   Other Topics Concern    Not on file   Social History Narrative    Not on file       Current Medications  Current Outpatient Medications on File Prior to Visit   Medication Sig Dispense Refill    amLODIPine (NORVASC) 10 MG tablet TAKE 1 TABLET BY MOUTH ONCE DAILY 90 tablet 1    losartan-hydrochlorothiazide 100-25 mg (HYZAAR) 100-25 mg per tablet TAKE ONE TABLET BY MOUTH ONCE DAILY 90 tablet 3    fenofibrate 160 MG Tab Take 1 tablet (160 mg total) by mouth once daily. (Patient not taking: Reported on 11/7/2019) 90 tablet 3    fluocinolone (SYNALAR) 0.01 % external solution       ketoconazole 2 % Foam       metFORMIN (GLUCOPHAGE) 500 MG tablet Take 1 tablet (500 mg total) by mouth 2 (two) times daily with meals. (Patient not taking: Reported on 11/7/2019) 180 tablet 3     No current facility-administered medications on file prior to visit.        Allergies   Review of patient's allergies indicates:   Allergen Reactions    Pravastatin      Myalgia with Elevated CK       Review of Systems (Pertinent positives)  Review of Systems   Constitutional:  "Negative for chills, diaphoresis, fever, malaise/fatigue and weight loss.   HENT: Positive for sore throat. Negative for congestion, ear discharge, ear pain, hearing loss, nosebleeds, sinus pain and tinnitus.         "spitting up cold"   Eyes: Negative.    Respiratory: Positive for cough and sputum production. Negative for hemoptysis, shortness of breath, wheezing and stridor.    Cardiovascular: Negative for chest pain, palpitations, orthopnea, claudication, leg swelling and PND.   Gastrointestinal: Negative.    Genitourinary: Negative.    Musculoskeletal: Negative.    Skin: Negative.    Neurological: Negative for dizziness, tingling, tremors, sensory change, speech change, focal weakness, seizures, loss of consciousness, weakness and headaches.   Endo/Heme/Allergies: Negative.    Psychiatric/Behavioral: Negative.        BP (!) 138/90 (BP Location: Right arm, Patient Position: Sitting, BP Method: Large (Manual))   Pulse 74   Temp 98.7 °F (37.1 °C) (Oral)   Resp 16   Ht 6' 1" (1.854 m)   Wt 132.6 kg (292 lb 5.3 oz)   SpO2 97%   BMI 38.57 kg/m²     Physical Exam   Constitutional: He is oriented to person, place, and time. He appears well-developed and well-nourished.  Non-toxic appearance. He does not have a sickly appearance. He does not appear ill. No distress.   HENT:   Head: Normocephalic and atraumatic.   Right Ear: Tympanic membrane, external ear and ear canal normal.   Left Ear: Tympanic membrane, external ear and ear canal normal.   Nose: No mucosal edema or rhinorrhea. Right sinus exhibits no maxillary sinus tenderness and no frontal sinus tenderness. Left sinus exhibits no maxillary sinus tenderness and no frontal sinus tenderness.   Mouth/Throat: Uvula is midline and mucous membranes are normal. No oropharyngeal exudate, posterior oropharyngeal edema, posterior oropharyngeal erythema or tonsillar abscesses. Tonsils are 0 on the right. Tonsils are 0 on the left. No tonsillar exudate.       Eyes: " Pupils are equal, round, and reactive to light. Conjunctivae and EOM are normal.   Neck: Normal range of motion. Neck supple. No JVD present.   Cardiovascular: Normal rate, regular rhythm, normal heart sounds and intact distal pulses. Exam reveals no gallop and no friction rub.   No murmur heard.  Pulmonary/Chest: Effort normal and breath sounds normal. No stridor. No respiratory distress. He has no wheezes. He has no rales. He exhibits no tenderness.   Abdominal: Soft. Bowel sounds are normal.   Musculoskeletal: Normal range of motion.   Lymphadenopathy:     He has no cervical adenopathy.   Neurological: He is alert and oriented to person, place, and time.   Skin: Skin is warm and dry. Capillary refill takes less than 2 seconds. He is not diaphoretic.   Vitals reviewed.       Assessment/Plan:  Layla Siegel is a 44 y.o. male who presents today for :    Layla was seen today for sore throat.    Diagnoses and all orders for this visit:    Viral URI with cough  -     POCT Rapid Strep A  -     desloratadine (CLARINEX) 5 mg tablet; Take 1 tablet (5 mg total) by mouth once daily.  -     fluticasone propionate (FLONASE) 50 mcg/actuation nasal spray; 1 spray (50 mcg total) by Each Nostril route once daily.  -     benzonatate (TESSALON) 100 MG capsule; Take 1 capsule (100 mg total) by mouth 3 (three) times daily as needed for Cough.    Viral, no antibiotics indicated.  Will treat postnasal drip with daily Flonase and antihistamine.  Add Tessalon for coughing.  Patient was instructed to drink plenty of water.  Take Tylenol or ibuprofen as needed for any aches, pains, or fevers.  Warm salt water gargles or tea with honey as needed for sore throat.    Benign essential HTN    Uncontrolled.  Patient has not had labs done in some time and is due for a follow-up with his PCP for this.  Will schedule.    Patient should contact the clinic if symptoms do not improve or if he has any other acute concerns.    Patient verbalized  understanding of instructions.        This office note has been dictated.  This dictation has been generated using M-Modal Fluency Direct dictation; some phonetic errors may occur.   My collaborating physician is Dr. Mayank Hercules.

## 2020-03-27 DIAGNOSIS — I10 ESSENTIAL HYPERTENSION: ICD-10-CM

## 2020-03-27 RX ORDER — LOSARTAN POTASSIUM 100 MG/1
TABLET ORAL
Refills: 0 | OUTPATIENT
Start: 2020-03-27

## 2020-03-27 RX ORDER — LOSARTAN POTASSIUM AND HYDROCHLOROTHIAZIDE 25; 100 MG/1; MG/1
1 TABLET ORAL DAILY
Qty: 90 TABLET | Refills: 0 | Status: SHIPPED | OUTPATIENT
Start: 2020-03-27 | End: 2020-07-02

## 2020-05-29 ENCOUNTER — PATIENT MESSAGE (OUTPATIENT)
Dept: ADMINISTRATIVE | Facility: HOSPITAL | Age: 45
End: 2020-05-29

## 2020-06-26 DIAGNOSIS — I10 ESSENTIAL HYPERTENSION: ICD-10-CM

## 2020-06-29 RX ORDER — LOSARTAN POTASSIUM AND HYDROCHLOROTHIAZIDE 25; 100 MG/1; MG/1
1 TABLET ORAL DAILY
Qty: 90 TABLET | Refills: 0 | OUTPATIENT
Start: 2020-06-29

## 2020-06-30 DIAGNOSIS — I10 ESSENTIAL HYPERTENSION: ICD-10-CM

## 2020-07-02 DIAGNOSIS — I10 ESSENTIAL HYPERTENSION: ICD-10-CM

## 2020-07-02 RX ORDER — LOSARTAN POTASSIUM AND HYDROCHLOROTHIAZIDE 25; 100 MG/1; MG/1
1 TABLET ORAL DAILY
Qty: 90 TABLET | Refills: 0 | OUTPATIENT
Start: 2020-07-02

## 2020-07-02 RX ORDER — AMLODIPINE BESYLATE 10 MG/1
TABLET ORAL
Qty: 30 TABLET | Refills: 0 | Status: SHIPPED | OUTPATIENT
Start: 2020-07-02 | End: 2020-07-14 | Stop reason: SDUPTHER

## 2020-07-02 RX ORDER — LOSARTAN POTASSIUM AND HYDROCHLOROTHIAZIDE 25; 100 MG/1; MG/1
1 TABLET ORAL DAILY
Qty: 30 TABLET | Refills: 0 | Status: SHIPPED | OUTPATIENT
Start: 2020-07-02 | End: 2020-07-14 | Stop reason: SDUPTHER

## 2020-07-14 ENCOUNTER — OFFICE VISIT (OUTPATIENT)
Dept: FAMILY MEDICINE | Facility: CLINIC | Age: 45
End: 2020-07-14
Payer: COMMERCIAL

## 2020-07-14 VITALS
TEMPERATURE: 97 F | HEART RATE: 78 BPM | WEIGHT: 290.56 LBS | SYSTOLIC BLOOD PRESSURE: 122 MMHG | BODY MASS INDEX: 38.51 KG/M2 | DIASTOLIC BLOOD PRESSURE: 74 MMHG | OXYGEN SATURATION: 98 % | HEIGHT: 73 IN | RESPIRATION RATE: 18 BRPM

## 2020-07-14 DIAGNOSIS — E78.5 HYPERLIPIDEMIA, UNSPECIFIED HYPERLIPIDEMIA TYPE: ICD-10-CM

## 2020-07-14 DIAGNOSIS — I10 BENIGN ESSENTIAL HTN: Primary | ICD-10-CM

## 2020-07-14 DIAGNOSIS — R73.03 PREDIABETES: ICD-10-CM

## 2020-07-14 PROCEDURE — 3078F PR MOST RECENT DIASTOLIC BLOOD PRESSURE < 80 MM HG: ICD-10-PCS | Mod: CPTII,S$GLB,, | Performed by: FAMILY MEDICINE

## 2020-07-14 PROCEDURE — 99214 OFFICE O/P EST MOD 30 MIN: CPT | Mod: S$GLB,,, | Performed by: FAMILY MEDICINE

## 2020-07-14 PROCEDURE — 99999 PR PBB SHADOW E&M-EST. PATIENT-LVL III: ICD-10-PCS | Mod: PBBFAC,,, | Performed by: FAMILY MEDICINE

## 2020-07-14 PROCEDURE — 3008F PR BODY MASS INDEX (BMI) DOCUMENTED: ICD-10-PCS | Mod: CPTII,S$GLB,, | Performed by: FAMILY MEDICINE

## 2020-07-14 PROCEDURE — 3008F BODY MASS INDEX DOCD: CPT | Mod: CPTII,S$GLB,, | Performed by: FAMILY MEDICINE

## 2020-07-14 PROCEDURE — 99214 PR OFFICE/OUTPT VISIT, EST, LEVL IV, 30-39 MIN: ICD-10-PCS | Mod: S$GLB,,, | Performed by: FAMILY MEDICINE

## 2020-07-14 PROCEDURE — 99999 PR PBB SHADOW E&M-EST. PATIENT-LVL III: CPT | Mod: PBBFAC,,, | Performed by: FAMILY MEDICINE

## 2020-07-14 PROCEDURE — 3074F SYST BP LT 130 MM HG: CPT | Mod: CPTII,S$GLB,, | Performed by: FAMILY MEDICINE

## 2020-07-14 PROCEDURE — 3074F PR MOST RECENT SYSTOLIC BLOOD PRESSURE < 130 MM HG: ICD-10-PCS | Mod: CPTII,S$GLB,, | Performed by: FAMILY MEDICINE

## 2020-07-14 PROCEDURE — 3078F DIAST BP <80 MM HG: CPT | Mod: CPTII,S$GLB,, | Performed by: FAMILY MEDICINE

## 2020-07-14 RX ORDER — LOSARTAN POTASSIUM AND HYDROCHLOROTHIAZIDE 25; 100 MG/1; MG/1
1 TABLET ORAL DAILY
Qty: 90 TABLET | Refills: 1 | Status: SHIPPED | OUTPATIENT
Start: 2020-07-14 | End: 2021-01-15 | Stop reason: SDUPTHER

## 2020-07-14 RX ORDER — AMLODIPINE BESYLATE 10 MG/1
10 TABLET ORAL DAILY
Qty: 90 TABLET | Refills: 1 | Status: SHIPPED | OUTPATIENT
Start: 2020-07-14 | End: 2021-01-15 | Stop reason: SDUPTHER

## 2020-07-20 NOTE — PROGRESS NOTES
"Subjective:       Patient ID: Layla Siegel is a 44 y.o. male.    Chief Complaint: Medication Refill (pt reported b/l leg swelling after walking for couple hours in [place]  & patient doubled Hyzaar yesterday b/c of eating seafood) and Abdominal Pain (still having stomach issues and 2yrs after removal)    HPI   Hypertension: Patient here for follow-up of elevated blood pressure. He is exercising and is not adherent to low salt diet.  Blood pressure is not checked regularly at home. Cardiac symptoms lower extremity edema occasionally. Patient denies chest pain, exertional chest pressure/discomfort, orthopnea, palpitations, paroxysmal nocturnal dyspnea, syncope and tachypnea.  Cardiovascular risk factors: male gender and obesity (BMI >= 30 kg/m2). Use of agents associated with hypertension: none. History of target organ damage: none.    Reports he doubled his Hyzaar yesterday because he knew he was going to eat very salty meal.     Review of Systems   Constitutional: Negative for chills, fever and unexpected weight change.   Respiratory: Negative for cough, shortness of breath and wheezing.    Cardiovascular: Positive for leg swelling. Negative for chest pain and palpitations.   Gastrointestinal: Negative for abdominal pain and blood in stool.   Genitourinary: Negative for dysuria and hematuria.         Objective:     /74 (BP Location: Left arm, Patient Position: Sitting, BP Method: Large (Manual))   Pulse 78   Temp 96.6 °F (35.9 °C) (Skin)   Resp 18   Ht 6' 1" (1.854 m)   Wt 131.8 kg (290 lb 9.1 oz)   SpO2 98%   BMI 38.34 kg/m²     Physical Exam  Vitals signs reviewed.   Constitutional:       Appearance: He is well-developed. He is not diaphoretic.   HENT:      Head: Normocephalic.      Right Ear: External ear normal.      Left Ear: External ear normal.      Nose: Nose normal.      Mouth/Throat:      Pharynx: No oropharyngeal exudate.   Neck:      Musculoskeletal: Normal range of motion and neck supple. "      Trachea: No tracheal deviation.   Cardiovascular:      Rate and Rhythm: Normal rate and regular rhythm.      Heart sounds: Normal heart sounds. No murmur.   Pulmonary:      Effort: Pulmonary effort is normal.      Breath sounds: Normal breath sounds. No wheezing or rales.   Abdominal:      General: Bowel sounds are normal.      Palpations: Abdomen is soft. Abdomen is not rigid. There is no mass.      Tenderness: There is no abdominal tenderness. There is no guarding or rebound.   Lymphadenopathy:      Cervical: No cervical adenopathy.   Neurological:      Mental Status: He is oriented to person, place, and time.      Sensory: No sensory deficit.      Motor: No atrophy.      Gait: Gait normal.      Deep Tendon Reflexes:      Reflex Scores:       Patellar reflexes are 2+ on the right side and 2+ on the left side.        Assessment:       1. Benign essential HTN    2. Prediabetes    3. Hyperlipidemia, unspecified hyperlipidemia type        Plan:       Layla was seen today for medication refill and abdominal pain.    Diagnoses and all orders for this visit:    Benign essential HTN  -     Comprehensive metabolic panel; Future  -     losartan-hydrochlorothiazide 100-25 mg (HYZAAR) 100-25 mg per tablet; Take 1 tablet by mouth once daily.  -     amLODIPine (NORVASC) 10 MG tablet; Take 1 tablet (10 mg total) by mouth once daily.  Patient declined doing labs today.   Agrees to have labs done in 2 days. Scheduled  Importance of having labs done as his last labs are over a year old, explained.  Importance of weight loss and good compliance and follow up explained.  Informed patient of danger of adjusting medication on his own.    Prediabetes  -     Hemoglobin A1C; Future  Check A1c    Hyperlipidemia, unspecified hyperlipidemia type  -     Comprehensive metabolic panel; Future  -     Lipid Panel; Future  Check lipids.

## 2020-07-28 ENCOUNTER — LAB VISIT (OUTPATIENT)
Dept: LAB | Facility: HOSPITAL | Age: 45
End: 2020-07-28
Attending: FAMILY MEDICINE
Payer: COMMERCIAL

## 2020-07-28 DIAGNOSIS — I10 BENIGN ESSENTIAL HTN: ICD-10-CM

## 2020-07-28 DIAGNOSIS — R73.03 PREDIABETES: ICD-10-CM

## 2020-07-28 DIAGNOSIS — E78.5 HYPERLIPIDEMIA, UNSPECIFIED HYPERLIPIDEMIA TYPE: ICD-10-CM

## 2020-07-28 LAB
ALBUMIN SERPL BCP-MCNC: 4.1 G/DL (ref 3.5–5.2)
ALP SERPL-CCNC: 71 U/L (ref 55–135)
ALT SERPL W/O P-5'-P-CCNC: 34 U/L (ref 10–44)
ANION GAP SERPL CALC-SCNC: 9 MMOL/L (ref 8–16)
AST SERPL-CCNC: 18 U/L (ref 10–40)
BILIRUB SERPL-MCNC: 0.4 MG/DL (ref 0.1–1)
BUN SERPL-MCNC: 17 MG/DL (ref 6–20)
CALCIUM SERPL-MCNC: 9.3 MG/DL (ref 8.7–10.5)
CHLORIDE SERPL-SCNC: 102 MMOL/L (ref 95–110)
CHOLEST SERPL-MCNC: 225 MG/DL (ref 120–199)
CHOLEST/HDLC SERPL: 4.5 {RATIO} (ref 2–5)
CO2 SERPL-SCNC: 27 MMOL/L (ref 23–29)
CREAT SERPL-MCNC: 0.9 MG/DL (ref 0.5–1.4)
EST. GFR  (AFRICAN AMERICAN): >60 ML/MIN/1.73 M^2
EST. GFR  (NON AFRICAN AMERICAN): >60 ML/MIN/1.73 M^2
GLUCOSE SERPL-MCNC: 111 MG/DL (ref 70–110)
HDLC SERPL-MCNC: 50 MG/DL (ref 40–75)
HDLC SERPL: 22.2 % (ref 20–50)
LDLC SERPL CALC-MCNC: 137.6 MG/DL (ref 63–159)
NONHDLC SERPL-MCNC: 175 MG/DL
POTASSIUM SERPL-SCNC: 3.8 MMOL/L (ref 3.5–5.1)
PROT SERPL-MCNC: 7.9 G/DL (ref 6–8.4)
SODIUM SERPL-SCNC: 138 MMOL/L (ref 136–145)
TRIGL SERPL-MCNC: 187 MG/DL (ref 30–150)

## 2020-07-28 PROCEDURE — 80061 LIPID PANEL: CPT

## 2020-07-28 PROCEDURE — 36415 COLL VENOUS BLD VENIPUNCTURE: CPT | Mod: PN

## 2020-07-28 PROCEDURE — 80053 COMPREHEN METABOLIC PANEL: CPT

## 2020-07-28 PROCEDURE — 83036 HEMOGLOBIN GLYCOSYLATED A1C: CPT

## 2020-07-29 LAB
ESTIMATED AVG GLUCOSE: 128 MG/DL (ref 68–131)
HBA1C MFR BLD HPLC: 6.1 % (ref 4–5.6)

## 2020-08-06 NOTE — PROGRESS NOTES
The 10-year ASCVD risk score (Lenore BELLE Jr., et al., 2013) is: 5.8%    Values used to calculate the score:      Age: 44 years      Sex: Male      Is Non- : Yes      Diabetic: No      Tobacco smoker: No      Systolic Blood Pressure: 122 mmHg      Is BP treated: Yes      HDL Cholesterol: 50 mg/dL      Total Cholesterol: 225 mg/dL

## 2020-09-02 ENCOUNTER — PATIENT OUTREACH (OUTPATIENT)
Dept: ADMINISTRATIVE | Facility: HOSPITAL | Age: 45
End: 2020-09-02

## 2020-09-16 ENCOUNTER — PATIENT OUTREACH (OUTPATIENT)
Dept: ADMINISTRATIVE | Facility: HOSPITAL | Age: 45
End: 2020-09-16

## 2021-01-15 ENCOUNTER — OFFICE VISIT (OUTPATIENT)
Dept: FAMILY MEDICINE | Facility: CLINIC | Age: 46
End: 2021-01-15
Payer: COMMERCIAL

## 2021-01-15 VITALS
RESPIRATION RATE: 18 BRPM | OXYGEN SATURATION: 96 % | WEIGHT: 289.25 LBS | HEIGHT: 73 IN | SYSTOLIC BLOOD PRESSURE: 126 MMHG | TEMPERATURE: 98 F | HEART RATE: 87 BPM | BODY MASS INDEX: 38.33 KG/M2 | DIASTOLIC BLOOD PRESSURE: 80 MMHG

## 2021-01-15 DIAGNOSIS — N52.9 ERECTILE DYSFUNCTION, UNSPECIFIED ERECTILE DYSFUNCTION TYPE: ICD-10-CM

## 2021-01-15 DIAGNOSIS — I10 BENIGN ESSENTIAL HTN: ICD-10-CM

## 2021-01-15 DIAGNOSIS — I10 ESSENTIAL HYPERTENSION: ICD-10-CM

## 2021-01-15 DIAGNOSIS — R73.03 PREDIABETES: Primary | ICD-10-CM

## 2021-01-15 PROCEDURE — 3008F BODY MASS INDEX DOCD: CPT | Mod: CPTII,S$GLB,, | Performed by: INTERNAL MEDICINE

## 2021-01-15 PROCEDURE — 99214 PR OFFICE/OUTPT VISIT, EST, LEVL IV, 30-39 MIN: ICD-10-PCS | Mod: S$GLB,,, | Performed by: INTERNAL MEDICINE

## 2021-01-15 PROCEDURE — 1125F PR PAIN SEVERITY QUANTIFIED, PAIN PRESENT: ICD-10-PCS | Mod: S$GLB,,, | Performed by: INTERNAL MEDICINE

## 2021-01-15 PROCEDURE — 99999 PR PBB SHADOW E&M-EST. PATIENT-LVL IV: CPT | Mod: PBBFAC,,, | Performed by: INTERNAL MEDICINE

## 2021-01-15 PROCEDURE — 1125F AMNT PAIN NOTED PAIN PRSNT: CPT | Mod: S$GLB,,, | Performed by: INTERNAL MEDICINE

## 2021-01-15 PROCEDURE — 99999 PR PBB SHADOW E&M-EST. PATIENT-LVL IV: ICD-10-PCS | Mod: PBBFAC,,, | Performed by: INTERNAL MEDICINE

## 2021-01-15 PROCEDURE — 3079F PR MOST RECENT DIASTOLIC BLOOD PRESSURE 80-89 MM HG: ICD-10-PCS | Mod: CPTII,S$GLB,, | Performed by: INTERNAL MEDICINE

## 2021-01-15 PROCEDURE — 99214 OFFICE O/P EST MOD 30 MIN: CPT | Mod: S$GLB,,, | Performed by: INTERNAL MEDICINE

## 2021-01-15 PROCEDURE — 3079F DIAST BP 80-89 MM HG: CPT | Mod: CPTII,S$GLB,, | Performed by: INTERNAL MEDICINE

## 2021-01-15 PROCEDURE — 3074F SYST BP LT 130 MM HG: CPT | Mod: CPTII,S$GLB,, | Performed by: INTERNAL MEDICINE

## 2021-01-15 PROCEDURE — 3008F PR BODY MASS INDEX (BMI) DOCUMENTED: ICD-10-PCS | Mod: CPTII,S$GLB,, | Performed by: INTERNAL MEDICINE

## 2021-01-15 PROCEDURE — 3074F PR MOST RECENT SYSTOLIC BLOOD PRESSURE < 130 MM HG: ICD-10-PCS | Mod: CPTII,S$GLB,, | Performed by: INTERNAL MEDICINE

## 2021-01-15 RX ORDER — METFORMIN HYDROCHLORIDE 500 MG/1
500 TABLET ORAL
Qty: 90 TABLET | Refills: 3
Start: 2021-01-15 | End: 2021-08-03

## 2021-01-15 RX ORDER — AMLODIPINE BESYLATE 10 MG/1
10 TABLET ORAL DAILY
Qty: 90 TABLET | Refills: 1 | Status: SHIPPED | OUTPATIENT
Start: 2021-01-15 | End: 2021-08-16

## 2021-01-15 RX ORDER — LOSARTAN POTASSIUM AND HYDROCHLOROTHIAZIDE 25; 100 MG/1; MG/1
1 TABLET ORAL DAILY
Qty: 90 TABLET | Refills: 1 | Status: SHIPPED | OUTPATIENT
Start: 2021-01-15 | End: 2021-08-12 | Stop reason: SDUPTHER

## 2021-01-15 RX ORDER — SILDENAFIL 100 MG/1
100 TABLET, FILM COATED ORAL DAILY PRN
Qty: 12 TABLET | Refills: 2 | Status: SHIPPED | OUTPATIENT
Start: 2021-01-15 | End: 2022-01-15

## 2021-01-22 ENCOUNTER — TELEPHONE (OUTPATIENT)
Dept: FAMILY MEDICINE | Facility: CLINIC | Age: 46
End: 2021-01-22

## 2021-04-15 ENCOUNTER — PATIENT MESSAGE (OUTPATIENT)
Dept: RESEARCH | Facility: HOSPITAL | Age: 46
End: 2021-04-15

## 2021-08-03 ENCOUNTER — OFFICE VISIT (OUTPATIENT)
Dept: FAMILY MEDICINE | Facility: CLINIC | Age: 46
End: 2021-08-03
Payer: COMMERCIAL

## 2021-08-03 VITALS
SYSTOLIC BLOOD PRESSURE: 108 MMHG | WEIGHT: 292.75 LBS | TEMPERATURE: 98 F | BODY MASS INDEX: 38.8 KG/M2 | OXYGEN SATURATION: 99 % | HEART RATE: 69 BPM | DIASTOLIC BLOOD PRESSURE: 72 MMHG | HEIGHT: 73 IN

## 2021-08-03 DIAGNOSIS — I10 ESSENTIAL HYPERTENSION: Primary | ICD-10-CM

## 2021-08-03 DIAGNOSIS — R73.03 PREDIABETES: ICD-10-CM

## 2021-08-03 DIAGNOSIS — Z23 NEED FOR TDAP VACCINATION: ICD-10-CM

## 2021-08-03 PROCEDURE — 3008F PR BODY MASS INDEX (BMI) DOCUMENTED: ICD-10-PCS | Mod: CPTII,S$GLB,, | Performed by: INTERNAL MEDICINE

## 2021-08-03 PROCEDURE — 90715 TDAP VACCINE 7 YRS/> IM: CPT | Mod: S$GLB,,, | Performed by: INTERNAL MEDICINE

## 2021-08-03 PROCEDURE — 1126F AMNT PAIN NOTED NONE PRSNT: CPT | Mod: CPTII,S$GLB,, | Performed by: INTERNAL MEDICINE

## 2021-08-03 PROCEDURE — 90471 TDAP VACCINE GREATER THAN OR EQUAL TO 7YO IM: ICD-10-PCS | Mod: S$GLB,,, | Performed by: INTERNAL MEDICINE

## 2021-08-03 PROCEDURE — 1126F PR PAIN SEVERITY QUANTIFIED, NO PAIN PRESENT: ICD-10-PCS | Mod: CPTII,S$GLB,, | Performed by: INTERNAL MEDICINE

## 2021-08-03 PROCEDURE — 90715 TDAP VACCINE GREATER THAN OR EQUAL TO 7YO IM: ICD-10-PCS | Mod: S$GLB,,, | Performed by: INTERNAL MEDICINE

## 2021-08-03 PROCEDURE — 1160F PR REVIEW ALL MEDS BY PRESCRIBER/CLIN PHARMACIST DOCUMENTED: ICD-10-PCS | Mod: CPTII,S$GLB,, | Performed by: INTERNAL MEDICINE

## 2021-08-03 PROCEDURE — 3078F DIAST BP <80 MM HG: CPT | Mod: CPTII,S$GLB,, | Performed by: INTERNAL MEDICINE

## 2021-08-03 PROCEDURE — 90471 IMMUNIZATION ADMIN: CPT | Mod: S$GLB,,, | Performed by: INTERNAL MEDICINE

## 2021-08-03 PROCEDURE — 3078F PR MOST RECENT DIASTOLIC BLOOD PRESSURE < 80 MM HG: ICD-10-PCS | Mod: CPTII,S$GLB,, | Performed by: INTERNAL MEDICINE

## 2021-08-03 PROCEDURE — 99214 PR OFFICE/OUTPT VISIT, EST, LEVL IV, 30-39 MIN: ICD-10-PCS | Mod: 25,S$GLB,, | Performed by: INTERNAL MEDICINE

## 2021-08-03 PROCEDURE — 1159F MED LIST DOCD IN RCRD: CPT | Mod: CPTII,S$GLB,, | Performed by: INTERNAL MEDICINE

## 2021-08-03 PROCEDURE — 1160F RVW MEDS BY RX/DR IN RCRD: CPT | Mod: CPTII,S$GLB,, | Performed by: INTERNAL MEDICINE

## 2021-08-03 PROCEDURE — 3008F BODY MASS INDEX DOCD: CPT | Mod: CPTII,S$GLB,, | Performed by: INTERNAL MEDICINE

## 2021-08-03 PROCEDURE — 99214 OFFICE O/P EST MOD 30 MIN: CPT | Mod: 25,S$GLB,, | Performed by: INTERNAL MEDICINE

## 2021-08-03 PROCEDURE — 3074F SYST BP LT 130 MM HG: CPT | Mod: CPTII,S$GLB,, | Performed by: INTERNAL MEDICINE

## 2021-08-03 PROCEDURE — 99999 PR PBB SHADOW E&M-EST. PATIENT-LVL III: CPT | Mod: PBBFAC,,, | Performed by: INTERNAL MEDICINE

## 2021-08-03 PROCEDURE — 1159F PR MEDICATION LIST DOCUMENTED IN MEDICAL RECORD: ICD-10-PCS | Mod: CPTII,S$GLB,, | Performed by: INTERNAL MEDICINE

## 2021-08-03 PROCEDURE — 99999 PR PBB SHADOW E&M-EST. PATIENT-LVL III: ICD-10-PCS | Mod: PBBFAC,,, | Performed by: INTERNAL MEDICINE

## 2021-08-03 PROCEDURE — 3074F PR MOST RECENT SYSTOLIC BLOOD PRESSURE < 130 MM HG: ICD-10-PCS | Mod: CPTII,S$GLB,, | Performed by: INTERNAL MEDICINE

## 2021-08-03 RX ORDER — METFORMIN HYDROCHLORIDE 500 MG/1
500 TABLET ORAL
Qty: 90 TABLET | Refills: 3 | Status: SHIPPED | OUTPATIENT
Start: 2021-08-03 | End: 2021-08-03

## 2021-08-03 RX ORDER — METFORMIN HYDROCHLORIDE 500 MG/1
500 TABLET ORAL 2 TIMES DAILY WITH MEALS
Qty: 180 TABLET | Refills: 3 | Status: SHIPPED | OUTPATIENT
Start: 2021-08-03 | End: 2022-10-25 | Stop reason: SDUPTHER

## 2021-08-12 DIAGNOSIS — I10 ESSENTIAL HYPERTENSION: ICD-10-CM

## 2021-08-12 RX ORDER — LOSARTAN POTASSIUM AND HYDROCHLOROTHIAZIDE 25; 100 MG/1; MG/1
1 TABLET ORAL DAILY
Qty: 90 TABLET | Refills: 0 | Status: SHIPPED | OUTPATIENT
Start: 2021-08-12 | End: 2021-12-06

## 2021-08-18 ENCOUNTER — LAB VISIT (OUTPATIENT)
Dept: LAB | Facility: HOSPITAL | Age: 46
End: 2021-08-18
Attending: INTERNAL MEDICINE
Payer: COMMERCIAL

## 2021-08-18 DIAGNOSIS — R73.03 PREDIABETES: ICD-10-CM

## 2021-08-18 DIAGNOSIS — I10 ESSENTIAL HYPERTENSION: ICD-10-CM

## 2021-08-18 LAB
ALBUMIN SERPL BCP-MCNC: 3.7 G/DL (ref 3.5–5.2)
ALP SERPL-CCNC: 76 U/L (ref 55–135)
ALT SERPL W/O P-5'-P-CCNC: 30 U/L (ref 10–44)
ANION GAP SERPL CALC-SCNC: 10 MMOL/L (ref 8–16)
AST SERPL-CCNC: 21 U/L (ref 10–40)
BASOPHILS # BLD AUTO: 0.02 K/UL (ref 0–0.2)
BASOPHILS NFR BLD: 0.4 % (ref 0–1.9)
BILIRUB SERPL-MCNC: 0.3 MG/DL (ref 0.1–1)
BUN SERPL-MCNC: 13 MG/DL (ref 6–20)
CALCIUM SERPL-MCNC: 9.4 MG/DL (ref 8.7–10.5)
CHLORIDE SERPL-SCNC: 103 MMOL/L (ref 95–110)
CHOLEST SERPL-MCNC: 232 MG/DL (ref 120–199)
CHOLEST/HDLC SERPL: 5.2 {RATIO} (ref 2–5)
CO2 SERPL-SCNC: 28 MMOL/L (ref 23–29)
CREAT SERPL-MCNC: 1 MG/DL (ref 0.5–1.4)
DIFFERENTIAL METHOD: ABNORMAL
EOSINOPHIL # BLD AUTO: 0.1 K/UL (ref 0–0.5)
EOSINOPHIL NFR BLD: 2.5 % (ref 0–8)
ERYTHROCYTE [DISTWIDTH] IN BLOOD BY AUTOMATED COUNT: 13.2 % (ref 11.5–14.5)
EST. GFR  (AFRICAN AMERICAN): >60 ML/MIN/1.73 M^2
EST. GFR  (NON AFRICAN AMERICAN): >60 ML/MIN/1.73 M^2
ESTIMATED AVG GLUCOSE: 128 MG/DL (ref 68–131)
GLUCOSE SERPL-MCNC: 120 MG/DL (ref 70–110)
HBA1C MFR BLD: 6.1 % (ref 4–5.6)
HCT VFR BLD AUTO: 39 % (ref 40–54)
HDLC SERPL-MCNC: 45 MG/DL (ref 40–75)
HDLC SERPL: 19.4 % (ref 20–50)
HGB BLD-MCNC: 13.6 G/DL (ref 14–18)
IMM GRANULOCYTES # BLD AUTO: 0.01 K/UL (ref 0–0.04)
IMM GRANULOCYTES NFR BLD AUTO: 0.2 % (ref 0–0.5)
LDLC SERPL CALC-MCNC: 149.6 MG/DL (ref 63–159)
LYMPHOCYTES # BLD AUTO: 2.4 K/UL (ref 1–4.8)
LYMPHOCYTES NFR BLD: 50 % (ref 18–48)
MCH RBC QN AUTO: 29.9 PG (ref 27–31)
MCHC RBC AUTO-ENTMCNC: 34.9 G/DL (ref 32–36)
MCV RBC AUTO: 86 FL (ref 82–98)
MONOCYTES # BLD AUTO: 0.5 K/UL (ref 0.3–1)
MONOCYTES NFR BLD: 9.5 % (ref 4–15)
NEUTROPHILS # BLD AUTO: 1.8 K/UL (ref 1.8–7.7)
NEUTROPHILS NFR BLD: 37.4 % (ref 38–73)
NONHDLC SERPL-MCNC: 187 MG/DL
NRBC BLD-RTO: 0 /100 WBC
PLATELET # BLD AUTO: 236 K/UL (ref 150–450)
PMV BLD AUTO: 9.5 FL (ref 9.2–12.9)
POTASSIUM SERPL-SCNC: 3.2 MMOL/L (ref 3.5–5.1)
PROT SERPL-MCNC: 7.6 G/DL (ref 6–8.4)
RBC # BLD AUTO: 4.55 M/UL (ref 4.6–6.2)
SODIUM SERPL-SCNC: 141 MMOL/L (ref 136–145)
TRIGL SERPL-MCNC: 187 MG/DL (ref 30–150)
TSH SERPL DL<=0.005 MIU/L-ACNC: 1.5 UIU/ML (ref 0.4–4)
WBC # BLD AUTO: 4.84 K/UL (ref 3.9–12.7)

## 2021-08-18 PROCEDURE — 83036 HEMOGLOBIN GLYCOSYLATED A1C: CPT | Performed by: INTERNAL MEDICINE

## 2021-08-18 PROCEDURE — 84443 ASSAY THYROID STIM HORMONE: CPT | Performed by: INTERNAL MEDICINE

## 2021-08-18 PROCEDURE — 85025 COMPLETE CBC W/AUTO DIFF WBC: CPT | Performed by: INTERNAL MEDICINE

## 2021-08-18 PROCEDURE — 36415 COLL VENOUS BLD VENIPUNCTURE: CPT | Mod: PN | Performed by: INTERNAL MEDICINE

## 2021-08-18 PROCEDURE — 80061 LIPID PANEL: CPT | Performed by: INTERNAL MEDICINE

## 2021-08-18 PROCEDURE — 80053 COMPREHEN METABOLIC PANEL: CPT | Performed by: INTERNAL MEDICINE

## 2021-11-11 ENCOUNTER — HOSPITAL ENCOUNTER (EMERGENCY)
Facility: HOSPITAL | Age: 46
Discharge: HOME | End: 2021-11-11
Attending: EMERGENCY MEDICINE
Payer: COMMERCIAL

## 2021-11-11 VITALS
HEIGHT: 73 IN | RESPIRATION RATE: 18 BRPM | TEMPERATURE: 100 F | DIASTOLIC BLOOD PRESSURE: 78 MMHG | SYSTOLIC BLOOD PRESSURE: 145 MMHG | OXYGEN SATURATION: 97 % | WEIGHT: 295 LBS | HEART RATE: 85 BPM | BODY MASS INDEX: 39.1 KG/M2

## 2021-11-11 DIAGNOSIS — K52.9 GASTROENTERITIS: Primary | ICD-10-CM

## 2021-11-11 LAB
ALBUMIN SERPL BCP-MCNC: 4.3 G/DL (ref 3.5–5.2)
ALP SERPL-CCNC: 68 U/L (ref 55–135)
ALT SERPL W/O P-5'-P-CCNC: 34 U/L (ref 10–44)
ANION GAP SERPL CALC-SCNC: 14 MMOL/L (ref 8–16)
AST SERPL-CCNC: 23 U/L (ref 10–40)
BACTERIA #/AREA URNS HPF: ABNORMAL /HPF
BASOPHILS # BLD AUTO: 0.01 K/UL (ref 0–0.2)
BASOPHILS NFR BLD: 0.1 % (ref 0–1.9)
BILIRUB SERPL-MCNC: 0.7 MG/DL (ref 0.1–1)
BILIRUB UR QL STRIP: NEGATIVE
BUN SERPL-MCNC: 19 MG/DL (ref 6–20)
CALCIUM SERPL-MCNC: 10.2 MG/DL (ref 8.7–10.5)
CHLORIDE SERPL-SCNC: 102 MMOL/L (ref 95–110)
CK SERPL-CCNC: 379 U/L (ref 20–200)
CLARITY UR: ABNORMAL
CO2 SERPL-SCNC: 25 MMOL/L (ref 23–29)
COLOR UR: YELLOW
CREAT SERPL-MCNC: 1.1 MG/DL (ref 0.5–1.4)
CTP QC/QA: YES
DIFFERENTIAL METHOD: ABNORMAL
EOSINOPHIL # BLD AUTO: 0 K/UL (ref 0–0.5)
EOSINOPHIL NFR BLD: 0.4 % (ref 0–8)
ERYTHROCYTE [DISTWIDTH] IN BLOOD BY AUTOMATED COUNT: 13.2 % (ref 11.5–14.5)
EST. GFR  (AFRICAN AMERICAN): >60 ML/MIN/1.73 M^2
EST. GFR  (NON AFRICAN AMERICAN): >60 ML/MIN/1.73 M^2
GLUCOSE SERPL-MCNC: 145 MG/DL (ref 70–110)
GLUCOSE UR QL STRIP: NEGATIVE
HCT VFR BLD AUTO: 43.9 % (ref 40–54)
HGB BLD-MCNC: 15.2 G/DL (ref 14–18)
HGB UR QL STRIP: NEGATIVE
HYALINE CASTS #/AREA URNS LPF: 0 /LPF
IMM GRANULOCYTES # BLD AUTO: 0.03 K/UL (ref 0–0.04)
IMM GRANULOCYTES NFR BLD AUTO: 0.4 % (ref 0–0.5)
KETONES UR QL STRIP: NEGATIVE
LEUKOCYTE ESTERASE UR QL STRIP: NEGATIVE
LIPASE SERPL-CCNC: 12 U/L (ref 4–60)
LYMPHOCYTES # BLD AUTO: 0.6 K/UL (ref 1–4.8)
LYMPHOCYTES NFR BLD: 7.3 % (ref 18–48)
MCH RBC QN AUTO: 30 PG (ref 27–31)
MCHC RBC AUTO-ENTMCNC: 34.6 G/DL (ref 32–36)
MCV RBC AUTO: 87 FL (ref 82–98)
MICROSCOPIC COMMENT: ABNORMAL
MONOCYTES # BLD AUTO: 0.4 K/UL (ref 0.3–1)
MONOCYTES NFR BLD: 5.5 % (ref 4–15)
NEUTROPHILS # BLD AUTO: 6.9 K/UL (ref 1.8–7.7)
NEUTROPHILS NFR BLD: 86.3 % (ref 38–73)
NITRITE UR QL STRIP: NEGATIVE
NRBC BLD-RTO: 0 /100 WBC
PH UR STRIP: 6 [PH] (ref 5–8)
PLATELET # BLD AUTO: 231 K/UL (ref 150–450)
PMV BLD AUTO: 9 FL (ref 9.2–12.9)
POTASSIUM SERPL-SCNC: 4.3 MMOL/L (ref 3.5–5.1)
PROT SERPL-MCNC: 8.3 G/DL (ref 6–8.4)
PROT UR QL STRIP: ABNORMAL
RBC # BLD AUTO: 5.06 M/UL (ref 4.6–6.2)
RBC #/AREA URNS HPF: 0 /HPF (ref 0–4)
SARS-COV-2 RDRP RESP QL NAA+PROBE: NEGATIVE
SODIUM SERPL-SCNC: 141 MMOL/L (ref 136–145)
SP GR UR STRIP: >1.03 (ref 1–1.03)
SQUAMOUS #/AREA URNS HPF: 1 /HPF
UNIDENT CRYS URNS QL MICRO: ABNORMAL
URN SPEC COLLECT METH UR: ABNORMAL
UROBILINOGEN UR STRIP-ACNC: NEGATIVE EU/DL
WBC # BLD AUTO: 7.97 K/UL (ref 3.9–12.7)
WBC #/AREA URNS HPF: 2 /HPF (ref 0–5)
WBC CLUMPS URNS QL MICRO: ABNORMAL

## 2021-11-11 PROCEDURE — 25000003 PHARM REV CODE 250: Performed by: NURSE PRACTITIONER

## 2021-11-11 PROCEDURE — 83690 ASSAY OF LIPASE: CPT | Performed by: NURSE PRACTITIONER

## 2021-11-11 PROCEDURE — 81000 URINALYSIS NONAUTO W/SCOPE: CPT | Performed by: NURSE PRACTITIONER

## 2021-11-11 PROCEDURE — 85025 COMPLETE CBC W/AUTO DIFF WBC: CPT | Performed by: NURSE PRACTITIONER

## 2021-11-11 PROCEDURE — 99284 EMERGENCY DEPT VISIT MOD MDM: CPT | Mod: 25

## 2021-11-11 PROCEDURE — 82550 ASSAY OF CK (CPK): CPT | Performed by: NURSE PRACTITIONER

## 2021-11-11 PROCEDURE — U0002 COVID-19 LAB TEST NON-CDC: HCPCS | Performed by: PHYSICIAN ASSISTANT

## 2021-11-11 PROCEDURE — 80053 COMPREHEN METABOLIC PANEL: CPT | Performed by: NURSE PRACTITIONER

## 2021-11-11 RX ORDER — DICYCLOMINE HYDROCHLORIDE 20 MG/1
20 TABLET ORAL 4 TIMES DAILY PRN
Qty: 20 TABLET | Refills: 0 | Status: SHIPPED | OUTPATIENT
Start: 2021-11-11 | End: 2021-12-11

## 2021-11-11 RX ORDER — ONDANSETRON 4 MG/1
4 TABLET, ORALLY DISINTEGRATING ORAL
Status: COMPLETED | OUTPATIENT
Start: 2021-11-11 | End: 2021-11-11

## 2021-11-11 RX ORDER — FAMOTIDINE 20 MG/1
20 TABLET, FILM COATED ORAL 2 TIMES DAILY
Qty: 28 TABLET | Refills: 0 | Status: SHIPPED | OUTPATIENT
Start: 2021-11-11 | End: 2023-06-20

## 2021-11-11 RX ORDER — ONDANSETRON 4 MG/1
4 TABLET, ORALLY DISINTEGRATING ORAL EVERY 6 HOURS PRN
Qty: 20 TABLET | Refills: 0 | Status: SHIPPED | OUTPATIENT
Start: 2021-11-11 | End: 2022-12-20

## 2021-11-11 RX ADMIN — ONDANSETRON 4 MG: 4 TABLET, ORALLY DISINTEGRATING ORAL at 10:11

## 2021-12-06 DIAGNOSIS — R73.03 PREDIABETES: Primary | ICD-10-CM

## 2021-12-06 DIAGNOSIS — I10 ESSENTIAL HYPERTENSION: ICD-10-CM

## 2021-12-06 RX ORDER — AMLODIPINE BESYLATE 10 MG/1
TABLET ORAL
Qty: 90 TABLET | Refills: 0 | Status: SHIPPED | OUTPATIENT
Start: 2021-12-06 | End: 2022-03-31

## 2021-12-06 RX ORDER — LOSARTAN POTASSIUM AND HYDROCHLOROTHIAZIDE 25; 100 MG/1; MG/1
1 TABLET ORAL DAILY
Qty: 90 TABLET | Refills: 0 | Status: SHIPPED | OUTPATIENT
Start: 2021-12-06 | End: 2022-03-31

## 2021-12-07 ENCOUNTER — LAB VISIT (OUTPATIENT)
Dept: LAB | Facility: HOSPITAL | Age: 46
End: 2021-12-07
Attending: INTERNAL MEDICINE
Payer: COMMERCIAL

## 2021-12-07 DIAGNOSIS — R73.03 PREDIABETES: ICD-10-CM

## 2021-12-07 DIAGNOSIS — I10 ESSENTIAL HYPERTENSION: ICD-10-CM

## 2021-12-07 LAB
ALBUMIN SERPL BCP-MCNC: 3.8 G/DL (ref 3.5–5.2)
ALP SERPL-CCNC: 72 U/L (ref 55–135)
ALT SERPL W/O P-5'-P-CCNC: 33 U/L (ref 10–44)
ANION GAP SERPL CALC-SCNC: 7 MMOL/L (ref 8–16)
AST SERPL-CCNC: 21 U/L (ref 10–40)
BILIRUB SERPL-MCNC: 0.4 MG/DL (ref 0.1–1)
BUN SERPL-MCNC: 14 MG/DL (ref 6–20)
CALCIUM SERPL-MCNC: 9.5 MG/DL (ref 8.7–10.5)
CHLORIDE SERPL-SCNC: 106 MMOL/L (ref 95–110)
CO2 SERPL-SCNC: 28 MMOL/L (ref 23–29)
CREAT SERPL-MCNC: 0.9 MG/DL (ref 0.5–1.4)
EST. GFR  (AFRICAN AMERICAN): >60 ML/MIN/1.73 M^2
EST. GFR  (NON AFRICAN AMERICAN): >60 ML/MIN/1.73 M^2
ESTIMATED AVG GLUCOSE: 131 MG/DL (ref 68–131)
GLUCOSE SERPL-MCNC: 98 MG/DL (ref 70–110)
HBA1C MFR BLD: 6.2 % (ref 4–5.6)
POTASSIUM SERPL-SCNC: 3.6 MMOL/L (ref 3.5–5.1)
PROT SERPL-MCNC: 7.7 G/DL (ref 6–8.4)
SODIUM SERPL-SCNC: 141 MMOL/L (ref 136–145)

## 2021-12-07 PROCEDURE — 80053 COMPREHEN METABOLIC PANEL: CPT | Performed by: INTERNAL MEDICINE

## 2021-12-07 PROCEDURE — 83036 HEMOGLOBIN GLYCOSYLATED A1C: CPT | Performed by: INTERNAL MEDICINE

## 2021-12-07 PROCEDURE — 36415 COLL VENOUS BLD VENIPUNCTURE: CPT | Mod: PN | Performed by: INTERNAL MEDICINE

## 2022-03-31 DIAGNOSIS — I10 ESSENTIAL HYPERTENSION: ICD-10-CM

## 2022-03-31 RX ORDER — AMLODIPINE BESYLATE 10 MG/1
TABLET ORAL
Qty: 90 TABLET | Refills: 0 | Status: SHIPPED | OUTPATIENT
Start: 2022-03-31 | End: 2022-07-19

## 2022-03-31 RX ORDER — LOSARTAN POTASSIUM AND HYDROCHLOROTHIAZIDE 25; 100 MG/1; MG/1
1 TABLET ORAL DAILY
Qty: 90 TABLET | Refills: 0 | Status: SHIPPED | OUTPATIENT
Start: 2022-03-31 | End: 2022-07-19

## 2022-03-31 NOTE — TELEPHONE ENCOUNTER
This Rx Request does not qualify for assessment with the [facility]   Please review protocol details and the Care Due Message for extra clinical information    Reasons Rx Request may be deferred:  1. Labs/Vitals overdue  2. Labs/Vitals abnormal  3. Patient has been seen in the ED/Hospital since the last PCP visit  4. Medication is non-delegated  5. Medication associated diagnosis code is outside of scope  6. Provider is a non-participating provider  7. Visit criteria not met (Patient needs to be seen at least every 15 months by PCP)    Note composed:1:35 PM 03/31/2022

## 2022-03-31 NOTE — TELEPHONE ENCOUNTER
Care Due:                  Date            Visit Type   Department     Provider  --------------------------------------------------------------------------------                                [facility]                              PRIMARY      MEDICINE/  Last Visit: 08-      CARE ([facility])   INTERNAL MED   Anival Rose  Next Visit: None Scheduled  None         None Found                                                            Last  Test          Frequency    Reason                     Performed    Due Date  --------------------------------------------------------------------------------    HBA1C.......  6 months...  metFORMIN................  12- 06-    Powered by [employer]. Reference number: [id number].   3/31/2022 8:40:26 AM CDT

## 2022-04-05 ENCOUNTER — OFFICE VISIT (OUTPATIENT)
Dept: FAMILY MEDICINE | Facility: CLINIC | Age: 47
End: 2022-04-05
Payer: COMMERCIAL

## 2022-04-05 VITALS
BODY MASS INDEX: 38.86 KG/M2 | HEIGHT: 73 IN | OXYGEN SATURATION: 96 % | WEIGHT: 293.19 LBS | HEART RATE: 69 BPM | DIASTOLIC BLOOD PRESSURE: 88 MMHG | RESPIRATION RATE: 18 BRPM | SYSTOLIC BLOOD PRESSURE: 144 MMHG

## 2022-04-05 DIAGNOSIS — R07.9 CHEST PAIN, UNSPECIFIED TYPE: ICD-10-CM

## 2022-04-05 DIAGNOSIS — E66.9 OBESITY (BMI 30-39.9): ICD-10-CM

## 2022-04-05 DIAGNOSIS — M54.41 ACUTE BILATERAL LOW BACK PAIN WITH RIGHT-SIDED SCIATICA: ICD-10-CM

## 2022-04-05 DIAGNOSIS — I10 ESSENTIAL HYPERTENSION: Primary | ICD-10-CM

## 2022-04-05 PROCEDURE — 3008F PR BODY MASS INDEX (BMI) DOCUMENTED: ICD-10-PCS | Mod: CPTII,S$GLB,, | Performed by: NURSE PRACTITIONER

## 2022-04-05 PROCEDURE — 99214 PR OFFICE/OUTPT VISIT, EST, LEVL IV, 30-39 MIN: ICD-10-PCS | Mod: S$GLB,,, | Performed by: NURSE PRACTITIONER

## 2022-04-05 PROCEDURE — 93005 EKG 12-LEAD: ICD-10-PCS | Mod: S$GLB,,, | Performed by: NURSE PRACTITIONER

## 2022-04-05 PROCEDURE — 3008F BODY MASS INDEX DOCD: CPT | Mod: CPTII,S$GLB,, | Performed by: NURSE PRACTITIONER

## 2022-04-05 PROCEDURE — 3079F DIAST BP 80-89 MM HG: CPT | Mod: CPTII,S$GLB,, | Performed by: NURSE PRACTITIONER

## 2022-04-05 PROCEDURE — 1159F MED LIST DOCD IN RCRD: CPT | Mod: CPTII,S$GLB,, | Performed by: NURSE PRACTITIONER

## 2022-04-05 PROCEDURE — 99999 PR PBB SHADOW E&M-EST. PATIENT-LVL V: CPT | Mod: PBBFAC,,, | Performed by: NURSE PRACTITIONER

## 2022-04-05 PROCEDURE — 99214 OFFICE O/P EST MOD 30 MIN: CPT | Mod: S$GLB,,, | Performed by: NURSE PRACTITIONER

## 2022-04-05 PROCEDURE — 93005 ELECTROCARDIOGRAM TRACING: CPT | Mod: S$GLB,,, | Performed by: NURSE PRACTITIONER

## 2022-04-05 PROCEDURE — 1160F RVW MEDS BY RX/DR IN RCRD: CPT | Mod: CPTII,S$GLB,, | Performed by: NURSE PRACTITIONER

## 2022-04-05 PROCEDURE — 1160F PR REVIEW ALL MEDS BY PRESCRIBER/CLIN PHARMACIST DOCUMENTED: ICD-10-PCS | Mod: CPTII,S$GLB,, | Performed by: NURSE PRACTITIONER

## 2022-04-05 PROCEDURE — 3079F PR MOST RECENT DIASTOLIC BLOOD PRESSURE 80-89 MM HG: ICD-10-PCS | Mod: CPTII,S$GLB,, | Performed by: NURSE PRACTITIONER

## 2022-04-05 PROCEDURE — 93010 ELECTROCARDIOGRAM REPORT: CPT | Mod: S$GLB,,, | Performed by: INTERNAL MEDICINE

## 2022-04-05 PROCEDURE — 3077F PR MOST RECENT SYSTOLIC BLOOD PRESSURE >= 140 MM HG: ICD-10-PCS | Mod: CPTII,S$GLB,, | Performed by: NURSE PRACTITIONER

## 2022-04-05 PROCEDURE — 93010 EKG 12-LEAD: ICD-10-PCS | Mod: S$GLB,,, | Performed by: INTERNAL MEDICINE

## 2022-04-05 PROCEDURE — 1159F PR MEDICATION LIST DOCUMENTED IN MEDICAL RECORD: ICD-10-PCS | Mod: CPTII,S$GLB,, | Performed by: NURSE PRACTITIONER

## 2022-04-05 PROCEDURE — 3077F SYST BP >= 140 MM HG: CPT | Mod: CPTII,S$GLB,, | Performed by: NURSE PRACTITIONER

## 2022-04-05 PROCEDURE — 99999 PR PBB SHADOW E&M-EST. PATIENT-LVL V: ICD-10-PCS | Mod: PBBFAC,,, | Performed by: NURSE PRACTITIONER

## 2022-04-05 RX ORDER — MELOXICAM 15 MG/1
15 TABLET ORAL DAILY PRN
Qty: 30 TABLET | Refills: 0 | Status: SHIPPED | OUTPATIENT
Start: 2022-04-05 | End: 2022-12-20 | Stop reason: ALTCHOICE

## 2022-04-05 NOTE — PROGRESS NOTES
Routine Office Visit    Patient Name: Layla Siegel    : 1975  MRN: [id number]    Chief Complaint:  Leg pain, med refill, chest pain    Subjective:  Layla is a 46 y.o. male who presents today for:    1. Leg pain, med refill, chest pain - patient who is known to me with a history of high cholesterol, hypertension reports today for evaluation.  He states he needs a refill of his blood pressure medications, but he also states that he received them this week.  Prior to this week he was only taking 5 mg of his amlodipine so he would not run out of his tablets.  He has been taking the appropriate dose of the 2 pills for the last 2 days.    He mainly reports issues with sciatica in the last 2 weeks without any specific inciting event or injury.  He endorses bilateral numbing back pain which occasionally radiates to the right hip and posterior aspect of the right knee.  He denies any numbness or tingling of the right leg however.  Denies any weakness, fevers, chills, or urinary symptoms.  He has not taken anything for the pain.    Has a history of pre diabetes but does not take his metformin as prescribed because it does cause an upset stomach.    At the end of the visit he mentions that he has been having L sided intermittent chest pain for the last 2 months.  He is not having chest pain currently today.  The chest pain is sporadic and there is really no pattern to the chest pain.  It does not necessarily happen on exertion.  No other reported cardiac symptoms.    He does not smoke.  He states that he does not participate in cardiovascular exercise and does not follow any specific diet.    This is the extent of his concerns.    Past Medical History  Past Medical History:   Diagnosis Date    High cholesterol     Hypertension        Past Surgical History  Past Surgical History:   Procedure Laterality Date    CHOLECYSTECTOMY  2017    COLONOSCOPY N/A 12/15/2015    Procedure: COLONOSCOPY;  Surgeon: Horace  MD Shayne;  Location: [facility];  Service: Endoscopy;  Laterality: N/A;       Family History  Family History   Problem Relation Age of Onset    Hypertension Mother     Transient ischemic attack Mother     Heart disease Maternal Grandfather     Heart disease Paternal Grandfather     Coronary artery disease Father         s/p cabg    Hypertension Brother        Social History  Social History     Socioeconomic History    Marital status:    Tobacco Use    Smoking status: Never Smoker    Smokeless tobacco: Never Used   Substance and Sexual Activity    Alcohol use: Yes     Comment: occasional, once a month 1-2 mixed vodka drink    Drug use: No    Sexual activity: Yes     Partners: Female     Birth control/protection: None       Current Medications  Current Outpatient Medications on File Prior to Visit   Medication Sig Dispense Refill    amLODIPine (NORVASC) 10 MG tablet Take 1 tablet by mouth once daily 90 tablet 0    losartan-hydrochlorothiazide 100-25 mg (HYZAAR) 100-25 mg per tablet Take 1 tablet by mouth once daily 90 tablet 0    metFORMIN (GLUCOPHAGE) 500 MG tablet Take 1 tablet (500 mg total) by mouth 2 (two) times daily with meals. 180 tablet 3    ondansetron (ZOFRAN-ODT) 4 MG TbDL Take 1 tablet (4 mg total) by mouth every 6 (six) hours as needed (nausea). 20 tablet 0    desloratadine (CLARINEX) 5 mg tablet Take 1 tablet (5 mg total) by mouth once daily. 30 tablet 11    famotidine (PEPCID) 20 MG tablet Take 1 tablet (20 mg total) by mouth 2 (two) times daily. for 14 days 28 tablet 0    sildenafiL (VIAGRA) 100 MG tablet Take 1 tablet (100 mg total) by mouth daily as needed for Erectile Dysfunction. 12 tablet 2     No current facility-administered medications on file prior to visit.       Allergies   Review of patient's allergies indicates:   Allergen Reactions    Pravastatin      Myalgia with Elevated CK       Review of Systems (Pertinent positives)  Review of Systems   Constitutional:  "Negative.  Negative for chills, fever and weight loss.   HENT: Negative.    Eyes: Negative.  Negative for blurred vision, double vision and photophobia.   Respiratory: Negative.    Cardiovascular: Positive for chest pain. Negative for palpitations, orthopnea, claudication, leg swelling and PND.   Gastrointestinal: Negative.  Negative for abdominal pain, diarrhea, heartburn, nausea and vomiting.   Genitourinary: Negative for dysuria, flank pain, frequency, hematuria and urgency.   Musculoskeletal: Positive for back pain and joint pain. Negative for falls, myalgias and neck pain.   Skin: Negative.    Neurological: Negative for dizziness, tingling, tremors, sensory change, speech change, focal weakness, seizures, loss of consciousness, weakness and headaches.   Endo/Heme/Allergies: Negative.    Psychiatric/Behavioral: Negative.        BP (!) 144/88 (BP Location: Left arm, Patient Position: Sitting, BP Method: Large (Manual))   Pulse 69   Resp 18   Ht 6' 1" (1.854 m)   Wt 133 kg (293 lb 3.4 oz)   SpO2 96%   BMI 38.68 kg/m²     Physical Exam  Vitals reviewed.   Constitutional:       General: He is not in acute distress.     Appearance: Normal appearance. He is obese. He is not ill-appearing, toxic-appearing or diaphoretic.   HENT:      Head: Normocephalic and atraumatic.   Eyes:      Extraocular Movements: Extraocular movements intact.      Conjunctiva/sclera: Conjunctivae normal.      Pupils: Pupils are equal, round, and reactive to light.   Cardiovascular:      Rate and Rhythm: Normal rate and regular rhythm.      Pulses: Normal pulses.      Heart sounds: Normal heart sounds.   Pulmonary:      Effort: Pulmonary effort is normal.      Breath sounds: Normal breath sounds. No wheezing.   Chest:      Chest wall: No tenderness.   Abdominal:      General: Bowel sounds are normal. There is no distension.      Palpations: Abdomen is soft. There is no mass.      Tenderness: There is no abdominal tenderness. "   Musculoskeletal:         General: No swelling or tenderness. Normal range of motion.   Skin:     General: Skin is warm and dry.      Capillary Refill: Capillary refill takes less than 2 seconds.   Neurological:      General: No focal deficit present.      Mental Status: He is alert and oriented to person, place, and time.      Motor: No weakness.      Coordination: Coordination normal.      Gait: Gait normal.   Psychiatric:         Mood and Affect: Mood normal.         Behavior: Behavior normal.          Assessment/Plan:  Layla Siegel is a 46 y.o. male who presents today for :    Diagnoses and all orders for this visit:    Essential hypertension  -     Basic Metabolic Panel; Future    BP uncontrolled, however patient states he just started taking his prescribed dose of his medications in the last 2 days.  Check BMP in 2 weeks with nurse blood pressure visit.  Recommended patient to try to take metformin regularly has gastrointestinal side effects of metformin may decline over time.    Acute bilateral low back pain with right-sided sciatica  -     meloxicam (MOBIC) 15 MG tablet; Take 1 tablet (15 mg total) by mouth daily as needed for Pain (take with food).  -     Ambulatory referral/consult to Physical/Occupational Therapy; Future    No alarming s/s - given history of elevated blood sugars will avoid steroids today.  Treat with p.r.n. Mobic. Patient was educated regarding the side effects of NSAIDs, including GI ulceration, kidney dysfunction, GI bleeding, and stomach upset.  Patient was instructed to stop the medication and call the clinic if the patient experiences any stomach upset, black tarry stools, bloody stools, or vomiting while taking this medication.  Recommended physical therapy which patient is amenable to.    Obesity (BMI 30-39.9)    Proper diet and exercise recommendations discussed with patient.    Chest pain, unspecified type  -     IN OFFICE EKG 12-LEAD (to [place])  -     Ambulatory  referral/consult to Cardiology; Future    EKG in office shows normal sinus rhythm with potential criteria for LVH.  No alarming changes.  Will refer to Cardiology for further evaluation given patient's risk factors (obesity, hypertension, etc.)        This office note has been dictated.  This dictation has been generated using M-Modal Fluency Direct dictation; some phonetic errors may occur.   My collaborating physician is Dr. Mayank Hercules.

## 2022-07-06 ENCOUNTER — OCCUPATIONAL HEALTH (OUTPATIENT)
Dept: URGENT CARE | Facility: CLINIC | Age: 47
End: 2022-07-06

## 2022-07-06 DIAGNOSIS — Z02.83 ENCOUNTER FOR DRUG SCREENING: Primary | ICD-10-CM

## 2022-07-06 LAB — BREATH ALCOHOL: 0

## 2022-07-06 PROCEDURE — 80305 DRUG TEST PRSMV DIR OPT OBS: CPT | Mod: S$GLB,,, | Performed by: EMERGENCY MEDICINE

## 2022-07-06 PROCEDURE — 82075 POCT BREATH ALCOHOL TEST: ICD-10-PCS | Mod: S$GLB,,, | Performed by: EMERGENCY MEDICINE

## 2022-07-06 PROCEDURE — 82075 ASSAY OF BREATH ETHANOL: CPT | Mod: S$GLB,,, | Performed by: EMERGENCY MEDICINE

## 2022-07-06 PROCEDURE — 80305 MEDTOX HAIR COLLECTION ONLY: ICD-10-PCS | Mod: S$GLB,,, | Performed by: EMERGENCY MEDICINE

## 2022-09-22 PROBLEM — E66.01 SEVERE OBESITY (BMI 35.0-39.9) WITH COMORBIDITY: Status: ACTIVE | Noted: 2022-09-22

## 2022-10-25 DIAGNOSIS — I10 ESSENTIAL HYPERTENSION: ICD-10-CM

## 2022-10-25 DIAGNOSIS — R73.03 PREDIABETES: ICD-10-CM

## 2022-10-25 RX ORDER — METFORMIN HYDROCHLORIDE 500 MG/1
500 TABLET ORAL 2 TIMES DAILY WITH MEALS
Qty: 180 TABLET | Refills: 0 | Status: SHIPPED | OUTPATIENT
Start: 2022-10-25 | End: 2023-06-20

## 2022-10-25 RX ORDER — LOSARTAN POTASSIUM AND HYDROCHLOROTHIAZIDE 25; 100 MG/1; MG/1
1 TABLET ORAL DAILY
Qty: 90 TABLET | Refills: 0 | Status: SHIPPED | OUTPATIENT
Start: 2022-10-25 | End: 2022-12-20 | Stop reason: SDUPTHER

## 2022-10-25 RX ORDER — AMLODIPINE BESYLATE 10 MG/1
10 TABLET ORAL DAILY
Qty: 90 TABLET | Refills: 0 | Status: SHIPPED | OUTPATIENT
Start: 2022-10-25 | End: 2022-12-20 | Stop reason: SDUPTHER

## 2022-10-25 NOTE — TELEPHONE ENCOUNTER
Care Due:                  Date            Visit Type   Department     Provider  --------------------------------------------------------------------------------                                [facility]                              PRIMARY      MEDICINE/  Last Visit: 08-      CARE ([facility])   INTERNAL MED   Anival Rose  Next Visit: None Scheduled  None         None Found                                                            Last  Test          Frequency    Reason                     Performed    Due Date  --------------------------------------------------------------------------------    Office Visit  12 months..  losartan-hydrochlorothiaz  08- 07-                             emmanuel, metFORMIN,                             sildenafiL...............    CMP.........  12 months..  losartan-hydrochlorothiaz  12- 12-                             emmanuel, metFORMIN...........    HBA1C.......  6 months...  metFORMIN................  12- 06-    [facility] Embedded Care Gaps. Reference number: [id number]. 10/25/2022   11:29:42 AM CDT

## 2022-10-25 NOTE — TELEPHONE ENCOUNTER
----- Message from Veronica Frye sent at 10/25/2022 11:22 AM CDT -----  Type: RX Refill Request    Who Called: pt     Have you contacted your pharmacy:    Refill or New Rx:amLODIPine (NORVASC) 10 MG tablet    losartan-hydrochlorothiazide 100-25 mg (HYZAAR) 100-25 mg per tablet    metFORMIN (GLUCOPHAGE) 500 MG tablet    RX Name and Strength:    How is the patient currently taking it? (ex. 1XDay):    Is this a 30 day or 90 day RX:    Preferred Pharmacy with phone number:  [facility]   [address] in [place], [place]     Local or Mail Order:    Ordering Provider:    Would the patient rather a call back or a response via [facility]? call    Best Call Back Number:[phone] (home)       Additional Information:

## 2022-11-15 ENCOUNTER — PATIENT OUTREACH (OUTPATIENT)
Dept: ADMINISTRATIVE | Facility: HOSPITAL | Age: 47
End: 2022-11-15
Payer: COMMERCIAL

## 2022-12-15 ENCOUNTER — LAB VISIT (OUTPATIENT)
Dept: LAB | Facility: HOSPITAL | Age: 47
End: 2022-12-15
Attending: INTERNAL MEDICINE
Payer: COMMERCIAL

## 2022-12-15 DIAGNOSIS — R73.03 PREDIABETES: ICD-10-CM

## 2022-12-15 DIAGNOSIS — I10 ESSENTIAL HYPERTENSION: ICD-10-CM

## 2022-12-15 LAB
ALBUMIN SERPL BCP-MCNC: 4 G/DL (ref 3.5–5.2)
ALP SERPL-CCNC: 68 U/L (ref 55–135)
ALT SERPL W/O P-5'-P-CCNC: 34 U/L (ref 10–44)
ANION GAP SERPL CALC-SCNC: 7 MMOL/L (ref 8–16)
AST SERPL-CCNC: 20 U/L (ref 10–40)
BASOPHILS # BLD AUTO: 0.02 K/UL (ref 0–0.2)
BASOPHILS NFR BLD: 0.4 % (ref 0–1.9)
BILIRUB SERPL-MCNC: 0.5 MG/DL (ref 0.1–1)
BUN SERPL-MCNC: 12 MG/DL (ref 6–20)
CALCIUM SERPL-MCNC: 9.5 MG/DL (ref 8.7–10.5)
CHLORIDE SERPL-SCNC: 104 MMOL/L (ref 95–110)
CHOLEST SERPL-MCNC: 201 MG/DL (ref 120–199)
CHOLEST/HDLC SERPL: 4.2 {RATIO} (ref 2–5)
CO2 SERPL-SCNC: 28 MMOL/L (ref 23–29)
CREAT SERPL-MCNC: 0.9 MG/DL (ref 0.5–1.4)
DIFFERENTIAL METHOD: NORMAL
EOSINOPHIL # BLD AUTO: 0.1 K/UL (ref 0–0.5)
EOSINOPHIL NFR BLD: 1.7 % (ref 0–8)
ERYTHROCYTE [DISTWIDTH] IN BLOOD BY AUTOMATED COUNT: 12.9 % (ref 11.5–14.5)
EST. GFR  (NO RACE VARIABLE): >60 ML/MIN/1.73 M^2
GLUCOSE SERPL-MCNC: 97 MG/DL (ref 70–110)
HCT VFR BLD AUTO: 41.7 % (ref 40–54)
HDLC SERPL-MCNC: 48 MG/DL (ref 40–75)
HDLC SERPL: 23.9 % (ref 20–50)
HGB BLD-MCNC: 14.2 G/DL (ref 14–18)
IMM GRANULOCYTES # BLD AUTO: 0.01 K/UL (ref 0–0.04)
IMM GRANULOCYTES NFR BLD AUTO: 0.2 % (ref 0–0.5)
LDLC SERPL CALC-MCNC: 102.2 MG/DL (ref 63–159)
LYMPHOCYTES # BLD AUTO: 2.2 K/UL (ref 1–4.8)
LYMPHOCYTES NFR BLD: 47.3 % (ref 18–48)
MCH RBC QN AUTO: 29.4 PG (ref 27–31)
MCHC RBC AUTO-ENTMCNC: 34.1 G/DL (ref 32–36)
MCV RBC AUTO: 86 FL (ref 82–98)
MONOCYTES # BLD AUTO: 0.5 K/UL (ref 0.3–1)
MONOCYTES NFR BLD: 9.9 % (ref 4–15)
NEUTROPHILS # BLD AUTO: 1.9 K/UL (ref 1.8–7.7)
NEUTROPHILS NFR BLD: 40.5 % (ref 38–73)
NONHDLC SERPL-MCNC: 153 MG/DL
NRBC BLD-RTO: 0 /100 WBC
PLATELET # BLD AUTO: 253 K/UL (ref 150–450)
PMV BLD AUTO: 9.4 FL (ref 9.2–12.9)
POTASSIUM SERPL-SCNC: 4 MMOL/L (ref 3.5–5.1)
PROT SERPL-MCNC: 8.2 G/DL (ref 6–8.4)
RBC # BLD AUTO: 4.83 M/UL (ref 4.6–6.2)
SODIUM SERPL-SCNC: 139 MMOL/L (ref 136–145)
TRIGL SERPL-MCNC: 254 MG/DL (ref 30–150)
WBC # BLD AUTO: 4.74 K/UL (ref 3.9–12.7)

## 2022-12-15 PROCEDURE — 85025 COMPLETE CBC W/AUTO DIFF WBC: CPT | Performed by: INTERNAL MEDICINE

## 2022-12-15 PROCEDURE — 83036 HEMOGLOBIN GLYCOSYLATED A1C: CPT | Performed by: INTERNAL MEDICINE

## 2022-12-15 PROCEDURE — 80061 LIPID PANEL: CPT | Performed by: INTERNAL MEDICINE

## 2022-12-15 PROCEDURE — 80053 COMPREHEN METABOLIC PANEL: CPT | Performed by: INTERNAL MEDICINE

## 2022-12-15 PROCEDURE — 36415 COLL VENOUS BLD VENIPUNCTURE: CPT | Mod: PN | Performed by: INTERNAL MEDICINE

## 2022-12-16 LAB
ESTIMATED AVG GLUCOSE: 126 MG/DL (ref 68–131)
HBA1C MFR BLD: 6 % (ref 4–5.6)

## 2022-12-20 ENCOUNTER — OFFICE VISIT (OUTPATIENT)
Dept: FAMILY MEDICINE | Facility: CLINIC | Age: 47
End: 2022-12-20
Payer: COMMERCIAL

## 2022-12-20 VITALS
WEIGHT: 295.44 LBS | DIASTOLIC BLOOD PRESSURE: 76 MMHG | RESPIRATION RATE: 18 BRPM | OXYGEN SATURATION: 96 % | SYSTOLIC BLOOD PRESSURE: 136 MMHG | BODY MASS INDEX: 38.98 KG/M2 | TEMPERATURE: 98 F | HEART RATE: 86 BPM

## 2022-12-20 DIAGNOSIS — I10 ESSENTIAL HYPERTENSION: Primary | ICD-10-CM

## 2022-12-20 DIAGNOSIS — Z23 NEED FOR INFLUENZA VACCINATION: ICD-10-CM

## 2022-12-20 DIAGNOSIS — E88.810 METABOLIC SYNDROME: ICD-10-CM

## 2022-12-20 PROCEDURE — 99999 PR PBB SHADOW E&M-EST. PATIENT-LVL III: CPT | Mod: PBBFAC,,, | Performed by: INTERNAL MEDICINE

## 2022-12-20 PROCEDURE — 1159F MED LIST DOCD IN RCRD: CPT | Mod: CPTII,S$GLB,, | Performed by: INTERNAL MEDICINE

## 2022-12-20 PROCEDURE — 1160F RVW MEDS BY RX/DR IN RCRD: CPT | Mod: CPTII,S$GLB,, | Performed by: INTERNAL MEDICINE

## 2022-12-20 PROCEDURE — 3075F PR MOST RECENT SYSTOLIC BLOOD PRESS GE 130-139MM HG: ICD-10-PCS | Mod: CPTII,S$GLB,, | Performed by: INTERNAL MEDICINE

## 2022-12-20 PROCEDURE — 90686 IIV4 VACC NO PRSV 0.5 ML IM: CPT | Mod: S$GLB,,, | Performed by: INTERNAL MEDICINE

## 2022-12-20 PROCEDURE — 99214 PR OFFICE/OUTPT VISIT, EST, LEVL IV, 30-39 MIN: ICD-10-PCS | Mod: 25,S$GLB,, | Performed by: INTERNAL MEDICINE

## 2022-12-20 PROCEDURE — 3008F PR BODY MASS INDEX (BMI) DOCUMENTED: ICD-10-PCS | Mod: CPTII,S$GLB,, | Performed by: INTERNAL MEDICINE

## 2022-12-20 PROCEDURE — 90686 FLU VACCINE (QUAD) GREATER THAN OR EQUAL TO 3YO PRESERVATIVE FREE IM: ICD-10-PCS | Mod: S$GLB,,, | Performed by: INTERNAL MEDICINE

## 2022-12-20 PROCEDURE — 3078F DIAST BP <80 MM HG: CPT | Mod: CPTII,S$GLB,, | Performed by: INTERNAL MEDICINE

## 2022-12-20 PROCEDURE — 3044F HG A1C LEVEL LT 7.0%: CPT | Mod: CPTII,S$GLB,, | Performed by: INTERNAL MEDICINE

## 2022-12-20 PROCEDURE — 99999 PR PBB SHADOW E&M-EST. PATIENT-LVL III: ICD-10-PCS | Mod: PBBFAC,,, | Performed by: INTERNAL MEDICINE

## 2022-12-20 PROCEDURE — 3044F PR MOST RECENT HEMOGLOBIN A1C LEVEL <7.0%: ICD-10-PCS | Mod: CPTII,S$GLB,, | Performed by: INTERNAL MEDICINE

## 2022-12-20 PROCEDURE — 3078F PR MOST RECENT DIASTOLIC BLOOD PRESSURE < 80 MM HG: ICD-10-PCS | Mod: CPTII,S$GLB,, | Performed by: INTERNAL MEDICINE

## 2022-12-20 PROCEDURE — 90471 IMMUNIZATION ADMIN: CPT | Mod: S$GLB,,, | Performed by: INTERNAL MEDICINE

## 2022-12-20 PROCEDURE — 3008F BODY MASS INDEX DOCD: CPT | Mod: CPTII,S$GLB,, | Performed by: INTERNAL MEDICINE

## 2022-12-20 PROCEDURE — 3075F SYST BP GE 130 - 139MM HG: CPT | Mod: CPTII,S$GLB,, | Performed by: INTERNAL MEDICINE

## 2022-12-20 PROCEDURE — 90471 FLU VACCINE (QUAD) GREATER THAN OR EQUAL TO 3YO PRESERVATIVE FREE IM: ICD-10-PCS | Mod: S$GLB,,, | Performed by: INTERNAL MEDICINE

## 2022-12-20 PROCEDURE — 99214 OFFICE O/P EST MOD 30 MIN: CPT | Mod: 25,S$GLB,, | Performed by: INTERNAL MEDICINE

## 2022-12-20 PROCEDURE — 1159F PR MEDICATION LIST DOCUMENTED IN MEDICAL RECORD: ICD-10-PCS | Mod: CPTII,S$GLB,, | Performed by: INTERNAL MEDICINE

## 2022-12-20 PROCEDURE — 1160F PR REVIEW ALL MEDS BY PRESCRIBER/CLIN PHARMACIST DOCUMENTED: ICD-10-PCS | Mod: CPTII,S$GLB,, | Performed by: INTERNAL MEDICINE

## 2022-12-20 RX ORDER — AMLODIPINE BESYLATE 10 MG/1
10 TABLET ORAL DAILY
Qty: 90 TABLET | Refills: 1 | Status: SHIPPED | OUTPATIENT
Start: 2022-12-20 | End: 2023-06-20 | Stop reason: ALTCHOICE

## 2022-12-20 RX ORDER — LOSARTAN POTASSIUM AND HYDROCHLOROTHIAZIDE 25; 100 MG/1; MG/1
1 TABLET ORAL DAILY
Qty: 90 TABLET | Refills: 0 | Status: SHIPPED | OUTPATIENT
Start: 2022-12-20 | End: 2023-05-01

## 2022-12-20 NOTE — PROGRESS NOTES
Subjective:       Patient ID: Layla Siegel is a 47 y.o. male.    Chief Complaint: Follow-up    F/u chronic conditions    HPI: 46 y/o w/ HTN obesity metabolic syndrome on metformin presents alone for scheduled follow up. Is undergoing work up for bariatric surgery at [facility]. Has been taking metformin once daily no LE swelling no REYES he does have intermittent right gluteal pain when sitting for prolong period no NSAIDs no radiation of pain below knee     Review of Systems   Constitutional:  Negative for activity change, fever and unexpected weight change.   HENT:  Negative for congestion, rhinorrhea, sore throat and trouble swallowing.    Eyes:  Negative for photophobia and redness.   Respiratory:  Negative for cough, chest tightness, shortness of breath and wheezing.    Cardiovascular:  Negative for chest pain, palpitations and leg swelling.   Gastrointestinal:  Negative for abdominal pain, blood in stool, constipation, diarrhea, nausea and vomiting.   Endocrine: Negative for cold intolerance, heat intolerance and polyuria.   Genitourinary:  Negative for decreased urine volume, difficulty urinating, dysuria and urgency.   Musculoskeletal:  Positive for back pain. Negative for arthralgias.   Skin:  Negative for rash.   Neurological:  Negative for dizziness, syncope, weakness and headaches.   Psychiatric/Behavioral:  Negative for dysphoric mood, sleep disturbance and suicidal ideas.      Objective:     Vitals:    12/20/22 1308   BP: 136/76   BP Location: Right arm   Patient Position: Sitting   BP Method: Large (Automatic)   Pulse: 86   Resp: 18   Temp: 98.3 °F (36.8 °C)   TempSrc: Oral   SpO2: 96%   Weight: 134 kg (295 lb 6.7 oz)          Physical Exam  Constitutional:       General: He is not in acute distress.     Appearance: He is well-developed. He is obese.   HENT:      Head: Normocephalic and atraumatic.   Eyes:      General: No scleral icterus.     Conjunctiva/sclera: Conjunctivae normal.    Cardiovascular:      Rate and Rhythm: Normal rate and regular rhythm.      Heart sounds: No murmur heard.    No friction rub. No gallop.   Pulmonary:      Effort: Pulmonary effort is normal.      Breath sounds: Normal breath sounds. No wheezing or rales.   Abdominal:      Palpations: Abdomen is soft.      Tenderness: There is no abdominal tenderness. There is no right CVA tenderness, left CVA tenderness, guarding or rebound.   Musculoskeletal:         General: No tenderness. Normal range of motion.      Cervical back: Normal range of motion.      Right lower leg: No edema.      Left lower leg: No edema.   Skin:     General: Skin is warm and dry.   Neurological:      Mental Status: He is alert and oriented to person, place, and time.      Cranial Nerves: No cranial nerve deficit.       Assessment and Plan   1. Essential hypertension  Bp at goal continue current medications labs reviewed normal electrolytes and renal funciton repeat in six months  - losartan-hydrochlorothiazide 100-25 mg (HYZAAR) 100-25 mg per tablet; Take 1 tablet by mouth once daily.  Dispense: 90 tablet; Refill: 0  - amLODIPine (NORVASC) 10 MG tablet; Take 1 tablet (10 mg total) by mouth once daily.  Dispense: 90 tablet; Refill: 1  - CBC Auto Differential; Future  - Comprehensive Metabolic Panel; Future    2. Metabolic syndrome  Continue metformin a1c q6months  - Comprehensive Metabolic Panel; Future  - Hemoglobin A1C; Future    3. Need for influenza vaccination  Flu vaccine today  - Influenza - Quadrivalent *Preferred* (6 months+) (PF)    4. BMI 38.0-38.9,adult  To continue work up for bariatric surgery medically optimized intermediate risk

## 2022-12-20 NOTE — PROGRESS NOTES
Administered Flu vaccine to RT deltoid, VIS 8/16/21 was given. Pt was instructed to wait in lobby for 15 minutes to note reaction.Verbalized understanding

## 2023-01-12 ENCOUNTER — HOSPITAL ENCOUNTER (EMERGENCY)
Facility: HOSPITAL | Age: 48
Discharge: HOME | End: 2023-01-12
Attending: EMERGENCY MEDICINE
Payer: COMMERCIAL

## 2023-01-12 VITALS
DIASTOLIC BLOOD PRESSURE: 66 MMHG | SYSTOLIC BLOOD PRESSURE: 135 MMHG | OXYGEN SATURATION: 95 % | HEART RATE: 64 BPM | RESPIRATION RATE: 18 BRPM | TEMPERATURE: 99 F

## 2023-01-12 DIAGNOSIS — M54.32 LEFT SIDED SCIATICA: Primary | ICD-10-CM

## 2023-01-12 LAB
HCV AB SERPL QL IA: NORMAL
HIV 1+2 AB+HIV1 P24 AG SERPL QL IA: NORMAL

## 2023-01-12 PROCEDURE — 63600175 PHARM REV CODE 636 W HCPCS: Performed by: EMERGENCY MEDICINE

## 2023-01-12 PROCEDURE — 36415 COLL VENOUS BLD VENIPUNCTURE: CPT | Performed by: EMERGENCY MEDICINE

## 2023-01-12 PROCEDURE — 99284 EMERGENCY DEPT VISIT MOD MDM: CPT

## 2023-01-12 PROCEDURE — 86803 HEPATITIS C AB TEST: CPT | Performed by: EMERGENCY MEDICINE

## 2023-01-12 PROCEDURE — 87389 HIV-1 AG W/HIV-1&-2 AB AG IA: CPT | Performed by: EMERGENCY MEDICINE

## 2023-01-12 PROCEDURE — 96372 THER/PROPH/DIAG INJ SC/IM: CPT | Performed by: PHYSICIAN ASSISTANT

## 2023-01-12 PROCEDURE — 96372 THER/PROPH/DIAG INJ SC/IM: CPT | Performed by: EMERGENCY MEDICINE

## 2023-01-12 PROCEDURE — 63600175 PHARM REV CODE 636 W HCPCS: Performed by: PHYSICIAN ASSISTANT

## 2023-01-12 RX ORDER — METHOCARBAMOL 500 MG/1
1000 TABLET, FILM COATED ORAL 3 TIMES DAILY PRN
Qty: 20 TABLET | Refills: 0 | OUTPATIENT
Start: 2023-01-12 | End: 2023-01-17

## 2023-01-12 RX ORDER — ORPHENADRINE CITRATE 30 MG/ML
60 INJECTION INTRAMUSCULAR; INTRAVENOUS
Status: COMPLETED | OUTPATIENT
Start: 2023-01-12 | End: 2023-01-12

## 2023-01-12 RX ORDER — KETOROLAC TROMETHAMINE 30 MG/ML
15 INJECTION, SOLUTION INTRAMUSCULAR; INTRAVENOUS
Status: DISCONTINUED | OUTPATIENT
Start: 2023-01-12 | End: 2023-01-12

## 2023-01-12 RX ORDER — KETOROLAC TROMETHAMINE 30 MG/ML
30 INJECTION, SOLUTION INTRAMUSCULAR; INTRAVENOUS
Status: COMPLETED | OUTPATIENT
Start: 2023-01-12 | End: 2023-01-12

## 2023-01-12 RX ADMIN — ORPHENADRINE CITRATE 60 MG: 30 INJECTION INTRAMUSCULAR; INTRAVENOUS at 11:01

## 2023-01-12 RX ADMIN — KETOROLAC TROMETHAMINE 30 MG: 30 INJECTION, SOLUTION INTRAMUSCULAR; INTRAVENOUS at 10:01

## 2023-01-12 NOTE — ED NOTES
Assumed care    Patient presents to ED with complaints of left leg pain, numbness to toes to left side, and calf spasms. Patient states he has a history of sciatic nerve pain to right side but left side has started recently. No change with bowel or bladder habits. Patient denies chest pain or shortness of breath. Patient AAOx4. Ambulatory without assist.

## 2023-01-12 NOTE — ED PROVIDER NOTES
"Encounter Date: 1/12/2023    SCRIBE #1 NOTE: I, Maru Lackey, am scribing for, and in the presence of,  Perez Solis MD.     History     Chief Complaint   Patient presents with    Leg Pain     Lt leg pain progressing from lower back (atraumatic) x 2 weeks; now with foot involvement & numbness to toes on Lt foot x 3 days     Time seen by provider: 10:40 AM on 01/12/2023    Layla Siegel is a 47 y.o. male with a PMHx of HTN and HLD who presents to the ED for evaluation of back pain radiating to the LLE that onset 2 weeks ago with associated numbness to the L toes, sparing the great toe x 3 days.  Patient describes L calf pain as a "thumping" sensation.  He references a fall that occurred the other day when attempting to stretch his LLE and his "leg gave out."  Patient expresses the pain is exacerbated with sitting and unchanged with OTC Ibuprofen.  The patient confirms deeper yellow urine, but denies a fever, N/V, painful urination, bloody urine, loss of bowel/bladder control or any other symptoms at this time.  No pertinent PSHx.      The history is provided by the patient.   Review of patient's allergies indicates:   Allergen Reactions    Pravastatin      Myalgia with Elevated CK     Past Medical History:   Diagnosis Date    High cholesterol     Hypertension      Past Surgical History:   Procedure Laterality Date    CHOLECYSTECTOMY  08/2017    COLONOSCOPY N/A 12/15/2015    Procedure: COLONOSCOPY;  Surgeon: Horace Bella MD;  Location: [facility];  Service: Endoscopy;  Laterality: N/A;     Family History   Problem Relation Age of Onset    Hypertension Mother     Transient ischemic attack Mother     Heart disease Maternal Grandfather     Heart disease Paternal Grandfather     Coronary artery disease Father         s/p cabg    Hypertension Brother      Social History     Tobacco Use    Smoking status: Never    Smokeless tobacco: Never   Substance Use Topics    Alcohol use: Yes     Comment: occasional, once a " month 1-2 mixed vodka drink    Drug use: No     Review of Systems   Constitutional:  Negative for fever.   HENT:  Negative for sore throat.    Respiratory:  Negative for shortness of breath.    Cardiovascular:  Negative for chest pain.   Gastrointestinal:  Negative for nausea and vomiting.   Genitourinary:  Negative for dysuria and hematuria.        Positive for deeper yellow urine.  Negative loss of bowel/bladder control.   Musculoskeletal:  Positive for back pain and myalgias (L calf).   Skin:  Negative for rash.   Neurological:  Positive for numbness (L toes excluding the big toe). Negative for weakness.   Hematological:  Does not bruise/bleed easily.     Physical Exam     Initial Vitals [01/12/23 0920]   BP Pulse Resp Temp SpO2   132/86 74 18 98 °F (36.7 °C) 98 %      MAP       --         Physical Exam    Nursing note and vitals reviewed.  Constitutional: He appears well-developed and well-nourished. He is not diaphoretic. No distress.   HENT:   Head: Normocephalic and atraumatic.   Eyes: EOM are normal. Pupils are equal, round, and reactive to light.   Neck: Neck supple.   Normal range of motion.  Cardiovascular:  Normal rate, regular rhythm, normal heart sounds and intact distal pulses.     Exam reveals no gallop and no friction rub.       No murmur heard.  Pulmonary/Chest: Breath sounds normal. No respiratory distress. He has no wheezes. He has no rhonchi. He has no rales.   Abdominal: Abdomen is soft. Bowel sounds are normal. There is no abdominal tenderness. There is no rebound and no guarding.   Musculoskeletal:      Cervical back: Normal range of motion and neck supple. No tenderness or bony tenderness.      Thoracic back: No tenderness or bony tenderness.      Lumbar back: No tenderness or bony tenderness. Positive left straight leg raise test.      Left lower leg: No tenderness.     Neurological: He is alert and oriented to person, place, and time.   Skin: Skin is warm.   Psychiatric: He has a normal  mood and affect. His behavior is normal. Judgment and thought content normal.       ED Course   Procedures  Labs Reviewed   HIV 1 / 2 ANTIBODY   HEPATITIS C ANTIBODY          Imaging Results    None          Medications   ketorolac injection 30 mg (30 mg Intramuscular Given 1/12/23 1047)   orphenadrine injection 60 mg (60 mg Intramuscular Given 1/12/23 1106)     Medical Decision Making:   History:   Old Medical Records: I decided to obtain old medical records.  Initial Assessment:   47-year-old male presents with back/leg pain.  Differential Diagnosis:   Initial differential diagnosis included but not limited to strain, sprain, and sciatica.  Clinical Tests:   Lab Tests: Ordered and Reviewed  ED Management:  The patient was emergently evaluated in the emergency department, his evaluation was significant for middle-age male with a positive straight leg test noted on the left side.  The patient has no signs or symptoms of an acute cauda equina syndrome.  I do not believe the patient needs any radiologic imaging at this time.  Based on the patient's history and physical exam he likely has an acute sciatica on the left side.  He was treated with a dose of parental Norflex and parental Toradol.  He is stable for discharge to home.  He will be discharged home with p.o. Robaxin and p.o. diclofenac.  He is referred to physical medicine rehab as an outpatient for further care.        Scribe Attestation:   Scribe #1: I performed the above scribed service and the documentation accurately describes the services I performed. I attest to the accuracy of the note.               I, Dr. Perez Solis, personally performed the services described in this documentation. All medical record entries made by the scribe were at my direction and in my presence.  I have reviewed the chart and agree that the record reflects my personal performance and is accurate and complete. Perez Solis MD.  4:17 PM 01/12/2023      Clinical Impression:    Final diagnoses:  [M54.32] Left sided sciatica (Primary)        ED Disposition Condition    Discharge Stable          ED Prescriptions       Medication Sig Dispense Start Date End Date Auth. Provider    methocarbamoL (ROBAXIN) 500 MG Tab Take 2 tablets (1,000 mg total) by mouth 3 (three) times daily as needed (pain). 20 tablet 1/12/2023 1/17/2023 Perez Solis MD          Follow-up Information       Follow up With Specialties Details Why Contact Info    Kaiser Romero MD Physical Medicine and Rehabilitation Schedule an appointment as soon as possible for a visit   [address] [place] [address] [address]  [address]  [facility] [address]  [phone]               Perez Solis MD  01/12/23 0373

## 2023-01-12 NOTE — FIRST PROVIDER EVALUATION
Emergency Department TeleTriage Encounter Note      CHIEF COMPLAINT    Chief Complaint   Patient presents with    Leg Pain     Lt leg pain progressing from lower back (atraumatic) x 2 weeks; now with foot involvement & numbness to toes on Lt foot x 3 days       VITAL SIGNS   Initial Vitals [01/12/23 0920]   BP Pulse Resp Temp SpO2   132/86 74 18 98 °F (36.7 °C) 98 %      MAP       --            ALLERGIES    Review of patient's allergies indicates:   Allergen Reactions    Pravastatin      Myalgia with Elevated CK       PROVIDER TRIAGE NOTE  Patient presents with pain radiating from the left hip to the foot. Complains of numbness in the toes, but no decreased blood flow. No recent injury. No focal weakness.       ORDERS  Labs Reviewed   HIV 1 / 2 ANTIBODY   HEPATITIS C ANTIBODY       ED Orders (720h ago, onward)      Start Ordered     Status Ordering Provider    01/12/23 0922 01/12/23 0922  HIV 1/2 Ag/Ab (4th Gen)  STAT         Pending Collection JAIRO KIRKLAND    01/12/23 0922 01/12/23 0922  Hepatitis C Antibody  STAT         Pending Collection JAIRO KIRKLAND              Virtual Visit Note: The provider triage portion of this emergency department evaluation and documentation was performed via [employer], a HIPAA-compliant telemedicine application, in concert with a tele-presenter in the room. A face to face patient evaluation with one of my colleagues will occur once the patient is placed in an emergency department room.      DISCLAIMER: This note was prepared with [employer] voice recognition transcription software. Garbled syntax, mangled pronouns, and other bizarre constructions may be attributed to that software system.

## 2023-02-01 ENCOUNTER — OFFICE VISIT (OUTPATIENT)
Dept: FAMILY MEDICINE | Facility: CLINIC | Age: 48
End: 2023-02-01
Payer: COMMERCIAL

## 2023-02-01 ENCOUNTER — TELEPHONE (OUTPATIENT)
Dept: FAMILY MEDICINE | Facility: CLINIC | Age: 48
End: 2023-02-01

## 2023-02-01 VITALS
SYSTOLIC BLOOD PRESSURE: 118 MMHG | DIASTOLIC BLOOD PRESSURE: 84 MMHG | WEIGHT: 294.56 LBS | HEART RATE: 81 BPM | OXYGEN SATURATION: 94 % | TEMPERATURE: 98 F | BODY MASS INDEX: 39.04 KG/M2 | RESPIRATION RATE: 18 BRPM | HEIGHT: 73 IN

## 2023-02-01 DIAGNOSIS — G89.29 CHRONIC LEFT-SIDED LOW BACK PAIN WITHOUT SCIATICA: Primary | ICD-10-CM

## 2023-02-01 DIAGNOSIS — M54.50 CHRONIC LEFT-SIDED LOW BACK PAIN WITHOUT SCIATICA: Primary | ICD-10-CM

## 2023-02-01 PROCEDURE — 3008F BODY MASS INDEX DOCD: CPT | Mod: CPTII,S$GLB,, | Performed by: INTERNAL MEDICINE

## 2023-02-01 PROCEDURE — 1160F RVW MEDS BY RX/DR IN RCRD: CPT | Mod: CPTII,S$GLB,, | Performed by: INTERNAL MEDICINE

## 2023-02-01 PROCEDURE — 3008F PR BODY MASS INDEX (BMI) DOCUMENTED: ICD-10-PCS | Mod: CPTII,S$GLB,, | Performed by: INTERNAL MEDICINE

## 2023-02-01 PROCEDURE — 1160F PR REVIEW ALL MEDS BY PRESCRIBER/CLIN PHARMACIST DOCUMENTED: ICD-10-PCS | Mod: CPTII,S$GLB,, | Performed by: INTERNAL MEDICINE

## 2023-02-01 PROCEDURE — 99999 PR PBB SHADOW E&M-EST. PATIENT-LVL IV: CPT | Mod: PBBFAC,,, | Performed by: INTERNAL MEDICINE

## 2023-02-01 PROCEDURE — 1159F MED LIST DOCD IN RCRD: CPT | Mod: CPTII,S$GLB,, | Performed by: INTERNAL MEDICINE

## 2023-02-01 PROCEDURE — 3074F PR MOST RECENT SYSTOLIC BLOOD PRESSURE < 130 MM HG: ICD-10-PCS | Mod: CPTII,S$GLB,, | Performed by: INTERNAL MEDICINE

## 2023-02-01 PROCEDURE — 3079F PR MOST RECENT DIASTOLIC BLOOD PRESSURE 80-89 MM HG: ICD-10-PCS | Mod: CPTII,S$GLB,, | Performed by: INTERNAL MEDICINE

## 2023-02-01 PROCEDURE — 3074F SYST BP LT 130 MM HG: CPT | Mod: CPTII,S$GLB,, | Performed by: INTERNAL MEDICINE

## 2023-02-01 PROCEDURE — 3079F DIAST BP 80-89 MM HG: CPT | Mod: CPTII,S$GLB,, | Performed by: INTERNAL MEDICINE

## 2023-02-01 PROCEDURE — 99214 OFFICE O/P EST MOD 30 MIN: CPT | Mod: S$GLB,,, | Performed by: INTERNAL MEDICINE

## 2023-02-01 PROCEDURE — 99214 PR OFFICE/OUTPT VISIT, EST, LEVL IV, 30-39 MIN: ICD-10-PCS | Mod: S$GLB,,, | Performed by: INTERNAL MEDICINE

## 2023-02-01 PROCEDURE — 99999 PR PBB SHADOW E&M-EST. PATIENT-LVL IV: ICD-10-PCS | Mod: PBBFAC,,, | Performed by: INTERNAL MEDICINE

## 2023-02-01 PROCEDURE — 1159F PR MEDICATION LIST DOCUMENTED IN MEDICAL RECORD: ICD-10-PCS | Mod: CPTII,S$GLB,, | Performed by: INTERNAL MEDICINE

## 2023-02-01 RX ORDER — METHOCARBAMOL 500 MG/1
1000 TABLET, FILM COATED ORAL 3 TIMES DAILY PRN
Qty: 90 TABLET | Refills: 0 | Status: SHIPPED | OUTPATIENT
Start: 2023-02-01 | End: 2023-02-11

## 2023-02-01 RX ORDER — GABAPENTIN 300 MG/1
300 CAPSULE ORAL NIGHTLY
Qty: 30 CAPSULE | Refills: 2 | Status: SHIPPED | OUTPATIENT
Start: 2023-02-01 | End: 2023-02-22

## 2023-02-01 RX ORDER — DICLOFENAC SODIUM 50 MG/1
50 TABLET, DELAYED RELEASE ORAL 2 TIMES DAILY PRN
Qty: 60 TABLET | Refills: 0 | Status: SHIPPED | OUTPATIENT
Start: 2023-02-01 | End: 2023-02-22

## 2023-02-01 NOTE — PROGRESS NOTES
Subjective:       Patient ID: Layla Siegel is a 47 y.o. male.    Chief Complaint: Back Pain and Leg Pain    Back Pain  This is a recurrent problem. The current episode started 1 to 4 weeks ago. The problem occurs constantly. The problem is unchanged. The pain is present in the gluteal. The quality of the pain is described as cramping and shooting. The pain radiates to the left thigh and left foot. The pain is moderate. Associated symptoms include paresthesias. Pertinent negatives include no abdominal pain, bladder incontinence, bowel incontinence, chest pain, dysuria, fever, headaches, perianal numbness, weakness or weight loss. Risk factors include obesity. He has tried NSAIDs and muscle relaxant for the symptoms. The treatment provided mild relief.   Review of Systems   Constitutional:  Negative for activity change, fever, unexpected weight change and weight loss.   HENT:  Negative for congestion, rhinorrhea, sore throat and trouble swallowing.    Eyes:  Negative for photophobia and redness.   Respiratory:  Negative for cough, chest tightness, shortness of breath and wheezing.    Cardiovascular:  Negative for chest pain, palpitations and leg swelling.   Gastrointestinal:  Negative for abdominal pain, blood in stool, bowel incontinence, constipation, diarrhea, nausea and vomiting.   Endocrine: Negative for cold intolerance, heat intolerance and polyuria.   Genitourinary:  Negative for bladder incontinence, decreased urine volume, difficulty urinating, dysuria and urgency.   Musculoskeletal:  Positive for back pain. Negative for arthralgias.   Skin:  Negative for rash.   Neurological:  Positive for paresthesias. Negative for dizziness, syncope, weakness and headaches.   Psychiatric/Behavioral:  Negative for dysphoric mood, sleep disturbance and suicidal ideas.      Objective:     Vitals:    02/01/23 1547   BP: 118/84   Pulse: 81   Resp: 18   Temp: 98.4 °F (36.9 °C)   TempSrc: Oral   SpO2: (!) 94%   Weight: 133.6 kg  "(294 lb 8.6 oz)   Height: 6' 1" (1.854 m)          Physical Exam  Constitutional:       Appearance: He is well-developed.   HENT:      Head: Normocephalic and atraumatic.   Eyes:      General: No scleral icterus.     Conjunctiva/sclera: Conjunctivae normal.   Cardiovascular:      Rate and Rhythm: Normal rate and regular rhythm.      Heart sounds: No murmur heard.    No friction rub. No gallop.   Pulmonary:      Effort: Pulmonary effort is normal.      Breath sounds: Normal breath sounds. No wheezing or rales.   Abdominal:      Palpations: Abdomen is soft.      Tenderness: There is no abdominal tenderness. There is no right CVA tenderness, left CVA tenderness, guarding or rebound.   Musculoskeletal:         General: Tenderness present.      Cervical back: Normal range of motion.      Comments: Very tight hamstrings bilaterally on PROM testing    Skin:     General: Skin is warm and dry.   Neurological:      Mental Status: He is alert and oriented to person, place, and time.      Cranial Nerves: No cranial nerve deficit.       Assessment and Plan   1. Chronic left-sided low back pain without sciatica  Start nightly gabapentin for neuropathic pain referral to  healthy back program for HEP and ROM/strengthening prn nsaid to take with food  - gabapentin (NEURONTIN) 300 MG capsule; Take 1 capsule (300 mg total) by mouth every evening.  Dispense: 30 capsule; Refill: 2  - Ambulatory referral/consult to [facility]; Future  - diclofenac (VOLTAREN) 50 MG EC tablet; Take 1 tablet (50 mg total) by mouth 2 (two) times daily as needed (back pain).  Dispense: 60 tablet; Refill: 0  - methocarbamoL (ROBAXIN) 500 MG Tab; Take 2 tablets (1,000 mg total) by mouth 3 (three) times daily as needed (back pain).  Dispense: 90 tablet; Refill: 0    "

## 2023-02-01 NOTE — TELEPHONE ENCOUNTER
"Patient states he had recently gone to urgent care for the same symptoms of lower back pain radiating all the way down to his left toes and describing it was "shocky" and like a selwyn horse. He was given topical cream at urgent care but didn't provide any relief. He was additionally in the ER on 1/12/23 for the same sxs and given robaxin, which did relieve it but he is seeking for more long-term use and relief and if there are other medications he can take.  Will he need an additional office visit first?  Last office visit 12/20/22, upcoming 6/20/23    "

## 2023-02-01 NOTE — TELEPHONE ENCOUNTER
----- Message from Pasquale Herndon sent at 2/1/2023 11:35 AM CST -----  Type: Patient Call Back    Who called: Self     What is the request in detail: PT stated he went to urgent care on 01/28 for Leg pain was prescribed medication stated it didn't work asking for a new Rx to be sent over for him OR asking if he need to be seen first     Can the clinic reply by [facility]? No     Would the patient rather a call back or a response via [facility]? Call     Best call back number: [phone] (home)     Additional information: [place] [address] [address]   Phone: [phone]  Fax:  [phone]

## 2023-02-22 ENCOUNTER — TELEPHONE (OUTPATIENT)
Dept: FAMILY MEDICINE | Facility: CLINIC | Age: 48
End: 2023-02-22
Payer: COMMERCIAL

## 2023-02-22 DIAGNOSIS — G89.29 CHRONIC LEFT-SIDED LOW BACK PAIN WITHOUT SCIATICA: ICD-10-CM

## 2023-02-22 DIAGNOSIS — M54.50 CHRONIC LEFT-SIDED LOW BACK PAIN WITHOUT SCIATICA: ICD-10-CM

## 2023-02-22 RX ORDER — GABAPENTIN 300 MG/1
300 CAPSULE ORAL NIGHTLY
Qty: 30 CAPSULE | Refills: 2 | Status: SHIPPED | OUTPATIENT
Start: 2023-02-22 | End: 2024-01-30 | Stop reason: SDUPTHER

## 2023-02-22 RX ORDER — DICLOFENAC SODIUM 50 MG/1
50 TABLET, DELAYED RELEASE ORAL 2 TIMES DAILY PRN
Qty: 60 TABLET | Refills: 0 | Status: SHIPPED | OUTPATIENT
Start: 2023-02-22 | End: 2023-04-12

## 2023-02-22 NOTE — TELEPHONE ENCOUNTER
----- Message from Marquita Sosa sent at 2/22/2023 12:15 PM CST -----  Regarding: Sooner Appt  .Type:  Sooner Appointment Request    Patient is requesting a sooner appointment.  Patient declined first available appointment listed as well as another facility and provider .  Patient will not accept being placed on the waitlist and is requesting a message be sent to doctor.    Name of Caller: Self    When is the first available appointment? 06/01/2023    Symptoms: leg pain    Would the patient rather a call back or a response via [facility]? Call back    Best Call Back Number: [phone]     Additional Information: Pt believes he needs a stronger dosage of the cream he already prescribed

## 2023-02-22 NOTE — TELEPHONE ENCOUNTER
Called pt back , says that usually takes 2 tylenol  with using cream . Today it gave no relief at all  . Asking for higher dosage of cream . Assisted with scheduling sooner apt , he accept 3/7 with ULISES Gregg . Please advise

## 2023-03-16 ENCOUNTER — CLINICAL SUPPORT (OUTPATIENT)
Dept: REHABILITATION | Facility: HOSPITAL | Age: 48
End: 2023-03-16
Payer: COMMERCIAL

## 2023-03-16 DIAGNOSIS — R29.898 DECREASED STRENGTH OF LOWER EXTREMITY: ICD-10-CM

## 2023-03-16 DIAGNOSIS — M54.50 CHRONIC LEFT-SIDED LOW BACK PAIN WITHOUT SCIATICA: ICD-10-CM

## 2023-03-16 DIAGNOSIS — G89.29 CHRONIC LEFT-SIDED LOW BACK PAIN WITHOUT SCIATICA: ICD-10-CM

## 2023-03-16 DIAGNOSIS — M53.86 DECREASED RANGE OF MOTION OF LUMBAR SPINE: ICD-10-CM

## 2023-03-16 DIAGNOSIS — M54.42 ACUTE BILATERAL LOW BACK PAIN WITH LEFT-SIDED SCIATICA: ICD-10-CM

## 2023-03-16 PROCEDURE — 97112 NEUROMUSCULAR REEDUCATION: CPT | Mod: PN

## 2023-03-16 PROCEDURE — 97110 THERAPEUTIC EXERCISES: CPT | Mod: PN

## 2023-03-16 PROCEDURE — 97161 PT EVAL LOW COMPLEX 20 MIN: CPT | Mod: PN

## 2023-03-16 NOTE — PLAN OF CARE
[facility] - PHYSICAL THERAPY LUMBAR EVALUATION     Name: Layla Siegel  Clinic Number: [id number]    Therapy Diagnosis:   Encounter Diagnoses   Name Primary?    Chronic left-sided low back pain without sciatica     Decreased strength of lower extremity     Decreased range of motion of lumbar spine     Acute bilateral low back pain with left-sided sciatica      Physician: Anival Rose MD    Physician Orders: PT Eval and Treat  Medical Diagnosis from Referral: M54.50,G89.29 (ICD-10-CM) - Chronic left-sided low back pain without sciatica  Evaluation Date: 3/16/2023  Authorization Period Expiration: 2/1/2024  Plan of Care Expiration: 6/23/2023  Reassessment Due: 4/16/2023  Visit # / Visits authorized: 1/ 1    Time In: 920 AM  Time Out: 1030  Total Billable Time: 60 minutes  Insurance:Fee for service Insurance Patient      Precautions: Standard    Pattern of pain determined: 4 PEP      Subjective   Date of onset: about 3 months ago.  Asked about falls. He did fall when he was going up the porch. He fell because he felt like he couldn't lift the L leg up he cant really remember the specifics of this incident.     History of current condition - Layla reports:started about 3 months ago it feels like there was someone drilling into the L hip. Reports standing and walking makes it worse and he just needs to lay down when it is bothering him. Sometimes in his sleep, sleeping on the L side hurts too. Even when he is not laying on it, he feels it. He has a lot of tumeric in his system today so he is doing pretty good today. Also has lidocaine patch on right now to help with the pain. Most of the pain is usually in the LLE verse the low back region. When it is hurting he cant sit. Reports the pain travels into the calf region. Sometimes it wont be in the back of the leg but always in the L hip and calf region. Numbness occurs in all toes but the big toe. Sometimes does have some night pain waking him from his  "sleep.      Medical History:   Past Medical History:   Diagnosis Date    High cholesterol     Hypertension      Surgical History:   Layla Siegel  has a past surgical history that includes Colonoscopy (N/A, 12/15/2015) and Cholecystectomy (08/2017).    Medications:   Layla has a current medication list which includes the following prescription(s): amlodipine, diclofenac, famotidine, gabapentin, losartan-hydrochlorothiazide 100-25 mg, metformin, and sildenafil.    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  Where is your pain the worst? LLE   2.  Is your pain constant or intermittent? Intermittent   3.  Does bending forward make your typical pain worse? Not sure   4.  Since the start of your back pain, has there been a change in your bowel or bladder? When he is peeing, the LLE pain is worsening.   5.  What can't you do now that you use to be able to do? When he is in pain he cant do much of anything     Pts goals: "stop this pain"     Red Flag Screening:   Cough  Sneeze  Strain: (--)  Bladder/ bowel: (--)  Falls: " (--)  Night pain: (+)  Unexplained weight loss: (--)  General health: good    OBJECTIVE     Postural examination/scapula alignment: Rounded shoulder, Head forward, and increased lordosis/anterior pelvic tilt     Correction of posture: no effect with lumbar roll    MOVEMENT LOSS Back   Norms ROM Loss Initial   Flexion Fingers touch toes, sacral angle >/= 70 deg, uniform spinal curvature, posterior weight shift  major loss   Extension ASIS surpasses toes, spine of scapulae surpasses heels, uniform spinal curve moderate loss   Side glide Right  major loss   Side glide Left  major loss   Rotation Right PT observes contralateral shoulder moderate loss   Rotation Left PT observes contralateral shoulder moderate loss       Lower Extremity Strength  Right LE  Left LE    Hip flexion: 5/5 Hip flexion: 5/5   Hip extension:  4+/5 Hip extension: 4+/5   Hip abduction: 4/5 Hip abduction: 4/5   Hip adduction:  3-/5 Hip adduction:  3-/5   Hip External Rotation 4+/5 Hip External Rotation 4+/5   Hip Internal rotation   4+/5 Hip Internal rotation 4+/5   Knee Flexion 4+/5 Knee Flexion 4+/5   Knee Extension 4+/5 Knee Extension 4+/5   Ankle dorsiflexion: 4+/5 Ankle dorsiflexion: 4+/5   Ankle plantarflexion: NT Ankle plantarflexion: NT       GAIT:  Assistive Device used: none  Level of Assistance: independent  Patient displays the following gait deviations:  no gait deviations observed.     Special Tests:   Test Name  Test Result   Prone Instability Test NT   SI Joint Provocation Test -   Straight Leg Raise (+)   Neural Tension Test (+)   Crossed Straight Leg Raise NT   Walking on toes NT   Walking on heels  NT       NEUROLOGICAL SCREENING     Sensory deficit: NT to be tested at first tx session     REPEATED TEST MOVEMENTS:    Baseline symptoms:  Repeated Flexion in Standing end range pain  produced posterior LLE    Repeated Extension in Standing no effect   Repeated Flexion in lying no effect   Repeated Extension in lying  no effect      STATIC TESTS and other movements:   Baseline symptoms:  Prone lie no effect   Prone lie on elbows no effect     Baseline Isometric Testing on Med X equipment: Testing administered by PT  Date of testing: 3/16/2023  ROM 0-48 deg   Max Peak Torque 118    Min Peak Torque 23    Flex/Ext Ratio 5   % below normative data 71     Limitation/Restriction for FOTO 3/16/2023 Survey    Therapist reviewed FOTO scores for Layla Siegel on 3/16/2023.   FOTO documents entered into [facility] - see Media section.    Limitation Score: 65% (35/100)       Treatment   Treatment Time In: 950  Treatment Time Out: 1020  Total Treatment time separate from Evaluation: 30 minutes      Layla received therapeutic exercises to develop/improve posture, lumbar/cervical ROM, strength and muscular endurance for 10 minutes including the following exercises:     Educated patient on HEP exercises. Had pt return demonstration to assess understanding and proper form. Pt demonstrated good understanding based on returned demonstration and verbalizations.       Layla received neuromuscular education  to engage spinal musculature correctly for motor control, and engagement of musculature  for 20 minutes including the MedX exercise component and practice and standard testing.  Med x dynamic exercise and baseline IM test performed with instructions to guide the patient safely through the isometric testing.  Patient informed to perform isometric test correctly, and safely, building best force they safely can and not pushing through pain.  Patient demonstrated understanding of information     [facility] 3/16/2023   Visit Number 1   VAS Pain Rating 0   Lumbar Extension Seat Pad 0   Femur Restraint 6   Top Dead Center 24   Counterweight 424   Lumbar Flexion 48   Lumbar Extension 0   Lumbar Peak Torque 118   Min Torque 23   Test Percent Below Normative Data 71   Ice - Z Lie (in min.) 10         Written Home Exercises Provided: yes.  HEP AS FOLLOWS:  Sciatic  nerve glide/sliders   Piriformis stretch   Standing and prone extensions   Bridges     Exercises were reviewed and Layla was able to demonstrate them prior to the end of the session.  Layla demonstrated good  understanding of the education provided.     See EMR under Patient Instructions for exercises provided 3/16/2023.      Education provided:   - Patient received education regarding proper posture and body mechanics.  Patient was given top [facility] Visit 1 handouts which discuss what to expect in therapy, the purpose and opportunity for health coaching, the program,  wellness when discharged from therapy, back education and care specifically for posture seated, standing, lifting correctly, components of exercise, importance of nutrition and hydration, and importance of sleep.   Information on lumbar rolls provided.  - Padmini roll tried, recommended, and purchase information was provided.    - Patient received a handout regarding anticipated muscular soreness following the isometric test and strategies for management were reviewed with patient including stretching, using ice and scheduled rest.   - Patient received education on the [facility] program, purpose of the isometric test, progression of back strengthening as well as wellness approach and systemic strengthening.  Details of the program were discussed.  Reviewed that patient should feel support/pressure from med ex restraints but no pain or discomfort and patient expressed understanding.  Med x dynamic exercise and baseline IM test performed with instructions to guide the patient safely through the isometric testing.  Patient informed to perform isometric test correctly, and safely, building best force they safely can and not pushing through pain.  Patient demonstrated understanding of information      Layla received cold pack for 10 minutes to lumbar spine.    Assessment   Layla is a 47 y.o. male referred to [facility] with a  medical diagnosis of M54.50,G89.29 (ICD-10-CM) - Chronic left-sided low back pain without sciatica. Pt presents with c/o L LE pain that started about 3 months ago and denies history of this type of pain in the past. He reports history of back spasms a few years ago but denies history of LE symptoms. He arrives without pain this date contributing this to taking tumeric this morning. He is also on gabapentin. Although no pain upon arrival, he did report pain and tension in posterior LLE with standing flexion and repeated flexion with end range pain in LLE. SLR and slump testing were positive for LLE indicating sciatic neural tension. Standing extensions did not have any effect in LLE symptoms but he did report some back discomfort with repeated extensions in standing but not in prone with repeated movements. He also demonstrates decreased BLE strength and decreased hip ROM. Medx testing indicates decreased lumbar ROM and strength placing patient about 71% below normative data. Patient will benefit from skilled Physical Therapist services to address above deficits which will lead to improved functional mobility and quality of life.     Pain Pattern: 4 PEP       Pt prognosis is Good.   Pt will benefit from skilled outpatient Physical Therapy to address the deficits stated above and in the chart below, provide pt/family education, and to maximize pt's level of independence. Based on the above history and physical examination an active physical therapy program is recommended.  Pt will continue to benefit from skilled outpatient physical therapy to address the deficits listed below in the chart, provide pt/family education and to maximize pt's level of independence in the home and community environment. .       Plan of care discussed with patient: Yes  Pt's spiritual, cultural and educational needs considered and patient is agreeable to the plan of care and goals as stated below:     Anticipated Barriers for therapy:  none    PT Evaluation Completed? Yes    Medical necessity is demonstrated by the following problem list.    Pt presents with the following impairments:     History  Co-morbidities and personal factors that may impact the plan of care Co-morbidities:   high BMI and HTN    Personal Factors:   no deficits     moderate   Examination  Body Structures and Functions, activity limitations and participation restrictions that may impact the plan of care Body Regions:   back  lower extremities    Body Systems:    gross symmetry  ROM  strength  gross coordinated movement    Participation Restrictions:   Decreased activity tolerance when having pain     Activity limitations:   Learning and applying knowledge  no deficits    General Tasks and Commands  no deficits    Communication  no deficits    Mobility  lifting and carrying objects  walking    Self care  no deficits    Domestic Life  shopping  cooking  doing house work (cleaning house, washing dishes, laundry)    Interactions/Relationships  no deficits    Life Areas  no deficits    Community and Social Life  community life  recreation and leisure         high   Clinical Presentation stable and uncomplicated low   Decision Making/ Complexity Score: low       GOALS: Pt is in agreement with the following goals.    Short term goals:  6 weeks or 10 visits   1.  Pt will demonstrate increased lumbar ROM by at least 3 degrees from the initial ROM value with improvements noted in functional ROM and ability to perform ADLs.  (approp and ongoing)  2.  Pt will demonstrate increased MedX average isometric strength value  by 20% from initial test resulting in improved ability to perform bending, lifting, and carrying activities safely, confidently.  (approp and ongoing)  3.  Patient report a reduction in worst pain score by 1-2 points for improved tolerance for standing and walking.  (approp and ongoing)  4.  Pt able to perform HEP correctly with minimal cueing or supervision from therapist  "to encourage independent management of symptoms. (approp and ongoing)      Long term goals: 10 weeks or 20 visits   1. Pt will demonstrate increased lumbar ROM by at least 6 degrees from initial ROM value, resulting in improved ability to perform functional fwd bending while standing and sitting. (approp and ongoing)  2. Pt will demonstrate increased MedX average isometric strength value  by 40% from initial test resulting in improved ability to perform bending, lifting, and carrying activities safely, confidently.  (approp and ongoing)  3. Pt to demonstrate ability to independently control and reduce their pain through posture positioning and mechanical movements throughout a typical day.  (approp and ongoing)  4.  Pt will demonstrate reduced pain and improved functional outcomes as reported on the FOTO by reaching a limitation score of < or = 40% or less in order to demonstrate subjective improvement in pt's condition.    (approp and ongoing)  5. Pt will demonstrate independence with the HEP at discharge  (approp and ongoing)  6.  Patient will report >/= 75% improvement in symptoms since evaluation to improve return [place]  (approp and ongoing)  7. Patient will demonstrate improvement in SLR ROM prior to onset of tension and symptoms to improve return to PLOF   (approp and ongoing)    Plan   Outpatient physical therapy 2x week for 10 weeks or 20 visits to include the following:   - Patient education  - Therapeutic exercise  - Manual therapy  - Performance testing   - Neuromuscular Re-education  - Therapeutic activity   - Modalities    Pt may be seen by PTA as part of the rehabilitation team.     Therapist: Lila Lucia, PT  3/16/2023    "I certify the need for these services furnished under this plan of treatment and while under my care."    ____________________________________  Physician/Referring Practitioner    _______________  Date of Signature          "

## 2023-03-23 ENCOUNTER — CLINICAL SUPPORT (OUTPATIENT)
Dept: REHABILITATION | Facility: HOSPITAL | Age: 48
End: 2023-03-23
Payer: COMMERCIAL

## 2023-03-23 DIAGNOSIS — M53.86 DECREASED RANGE OF MOTION OF LUMBAR SPINE: ICD-10-CM

## 2023-03-23 DIAGNOSIS — R29.898 DECREASED STRENGTH OF LOWER EXTREMITY: Primary | ICD-10-CM

## 2023-03-23 DIAGNOSIS — M54.42 ACUTE BILATERAL LOW BACK PAIN WITH LEFT-SIDED SCIATICA: ICD-10-CM

## 2023-03-23 PROCEDURE — 97110 THERAPEUTIC EXERCISES: CPT | Mod: PN,CQ

## 2023-03-23 PROCEDURE — 97112 NEUROMUSCULAR REEDUCATION: CPT | Mod: PN,CQ

## 2023-03-23 NOTE — PROGRESS NOTES
"[facility]      Name: Layla Siegel  Clinic Number: [id number]    Therapy Diagnosis:   Encounter Diagnoses   Name Primary?    Decreased strength of lower extremity Yes    Decreased range of motion of lumbar spine     Acute bilateral low back pain with left-sided sciatica      Physician: Anival Rose MD    Visit Date: 3/23/2023    Physician Orders: PT Eval and Treat  Medical Diagnosis from Referral: M54.50,G89.29 (ICD-10-CM) - Chronic left-sided low back pain without sciatica  Evaluation Date: 3/16/2023  Authorization Period Expiration: 12/31/2023  Plan of Care Expiration: 6/23/2023  Reassessment Due: 4/16/2023  Visit # / Visits authorized: 2/ 12    Time In: 0906  Time Out: 1005  Total Billable Time: 59 minutes  Insurance type:  Fee for service Insurance Patient    Precautions: Standard    Pattern of pain determined: 4 PEP    Subjective   Layla reports he is feeling ok.  Pt stated he didn't take pain medicine this morning because he took it last time and he wanted to see the difference.      Patient reports tolerating previous visit back was "a little sore"  Patient reports their pain to be 5/10 on a 0-10 scale with 0 being no pain and 10 being the worst pain imaginable.  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 because of the pain- sometimes it can be 4 days sometimes 4 weeks and plans to return   Pt goals: "stop this pain"     Objective     Baseline Isometric Testing on Med X equipment: Testing administered by PT  Date of testing: 3/16/2023  ROM 0-48 deg   Max Peak Torque 118    Min Peak Torque 23    Flex/Ext Ratio 5   % below normative data 71     Foto:  Eval - 35/100        Treatment    Layla received the treatments listed below:     therapeutic exercises to develop strength, endurance, ROM, flexibility, posture, and core stabilization for 35 minutes " including:  Neuromuscular re-education: 14 minutes    Treadmill x 5 minutes @ 1.9 mph    Standing exercises:  Lumbar extension 3 x 10 reps (Neuromuscular re-education)    Supine exercises:  Lower trunk rotation x 10 reps   Bridging x 10 reps (Neuromuscular re-education)  Bridging with ball squeezes x 10 reps (Neuromuscular re-education)  Hip Abduction Green Theraband x 10 reps     Sidelying exercises:   Open books x 10 reps bilateral (Neuromuscular re-education)    Prone exercises:  Press ups x 10 reps with over pressure  On elbows 1'      [facility] 3/23/2023   Visit Number 2   VAS Pain Rating 5   Treadmill Time (in min.) 5   Speed 1.9   Incline 0   Extension in Standing 30   Lumbar Extension Seat Pad -   Femur Restraint -   Top Dead Center -   Counterweight -   Lumbar Flexion -   Lumbar Extension -   Lumbar Peak Torque -   Min Torque -   Test Percent Below Normative Data -   Lumbar Weight 45   Repetitions 20   Rating of Perceived Exertion 4   Ice - Z Lie (in min.) 10       Khalid received neuromuscular education  to isolate and engage spinal stabilization musculature correctly for motor control and coordination to aid in function and posture for 10 minutes on the Medical Medx Machine.  Patient performed MedX dynamic exercise with emphasis on spinal muscular control using pacer throughout  active range of motion. Therapist assisted patient in achieving optimal exertion for neural reeducation and endurance training by using the  Ron Exertion Rating scale, by instructing the patient to aim for mid range of exertion, performing 15-20 repetitions, slowly, correctly,and safely.       Peripheral muscle strengthening which included 1 set of 15-20 repetitions at a slow, controlled 10-13 second per rep pace focused on strengthening supporting musculature for improved body mechanics and functional mobility.  Pt and therapist focused on proper form during treatment to ensure optimal strengthening of each targeted  muscle group.  Machines were utilized including torso rotation, leg extension, leg curl, chest press, upright row. Tricep extension, bicep curl, leg press, and hip abduction added visit 3      Home Exercises Provided and Patient Education Provided   Stretching: ongoing  Strengthening: ongoing  Cardio program (V5): 3/23/2023  Lifting education (V11):  Posture/ Using Lumbar Roll:ongoing  Fridge Magnet Discharge handout (date given):    Education provided:   -apply ice as needed for delayed muscle soreness  -Continue with Home exercise program daily     Written Home Exercises Provided: Patient instructed to cont prior HEP.  Exercises were reviewed and Layla was able to demonstrate them prior to the end of the session.  Khalid demonstrated good  understanding of the education provided.     See EMR under Patient Instructions for exercises provided prior visit.      Assessment   Donnellalid provided good effort and participation toward therapeutic interventions today with focus on  low back/core strengthening, stability.  Pt did well with exercises without any reports of pain.  Pt responded well with extension exercises.    Patient is making good progress towards established goals.  Pt will continue to benefit from skilled outpatient physical therapy to address the deficits stated in the impairment chart, provide pt/family education and to maximize pt's level of independence in the home and community environment.     Anticipated Barriers for therapy: none  Pt's spiritual, cultural and educational needs considered and pt agreeable to plan of care and goals as stated below:     Short term goals:  6 weeks or 10 visits   1.  Pt will demonstrate increased lumbar ROM by at least 3 degrees from the initial ROM value with improvements noted in functional ROM and ability to perform ADLs.  (approp and ongoing)  2.  Pt will demonstrate increased MedX average isometric strength value  by 20% from initial test resulting in improved  ability to perform bending, lifting, and carrying activities safely, confidently.  (approp and ongoing)  3.  Patient report a reduction in worst pain score by 1-2 points for improved tolerance for standing and walking.  (approp and ongoing)  4.  Pt able to perform HEP correctly with minimal cueing or supervision from therapist to encourage independent management of symptoms. (approp and ongoing)        Long term goals: 10 weeks or 20 visits   1. Pt will demonstrate increased lumbar ROM by at least 6 degrees from initial ROM value, resulting in improved ability to perform functional fwd bending while standing and sitting. (approp and ongoing)  2. Pt will demonstrate increased MedX average isometric strength value  by 40% from initial test resulting in improved ability to perform bending, lifting, and carrying activities safely, confidently.  (approp and ongoing)  3. Pt to demonstrate ability to independently control and reduce their pain through posture positioning and mechanical movements throughout a typical day.  (approp and ongoing)  4.  Pt will demonstrate reduced pain and improved functional outcomes as reported on the FOTO by reaching a limitation score of < or = 40% or less in order to demonstrate subjective improvement in pt's condition.    (approp and ongoing)  5. Pt will demonstrate independence with the HEP at discharge  (approp and ongoing)  6.  Patient will report >/= 75% improvement in symptoms since evaluation to improve return to job [place]  (approp and ongoing)  7. Patient will demonstrate improvement in SLR ROM prior to onset of tension and symptoms to improve return to PLOF   (approp and ongoing)     Plan   Outpatient physical therapy 2x week for 10 weeks or 20 visits to include the following:   - Patient education  - Therapeutic exercise  - Manual therapy  - Performance testing   - Neuromuscular Re-education  - Therapeutic activity   - Modalities     Pt may be seen by PTA as part of the  rehabilitation team.       Therapist: Renetta Newell, PTA  3/23/2023

## 2023-03-30 ENCOUNTER — TELEPHONE (OUTPATIENT)
Dept: REHABILITATION | Facility: HOSPITAL | Age: 48
End: 2023-03-30
Payer: COMMERCIAL

## 2023-03-30 NOTE — TELEPHONE ENCOUNTER
Patient was contacted 2/2 no show to tx session this morning at 730. Patient reported he forgot about his appointment. Physical Therapist informed pt on next tx day and time. Patient verbalized confirmation.     Lila Lucia, PT

## 2023-04-03 ENCOUNTER — TELEPHONE (OUTPATIENT)
Dept: REHABILITATION | Facility: HOSPITAL | Age: 48
End: 2023-04-03
Payer: COMMERCIAL

## 2023-04-04 ENCOUNTER — DOCUMENTATION ONLY (OUTPATIENT)
Dept: REHABILITATION | Facility: HOSPITAL | Age: 48
End: 2023-04-04
Payer: COMMERCIAL

## 2023-04-04 ENCOUNTER — CLINICAL SUPPORT (OUTPATIENT)
Dept: REHABILITATION | Facility: HOSPITAL | Age: 48
End: 2023-04-04
Payer: COMMERCIAL

## 2023-04-04 DIAGNOSIS — M54.42 ACUTE BILATERAL LOW BACK PAIN WITH LEFT-SIDED SCIATICA: ICD-10-CM

## 2023-04-04 DIAGNOSIS — R29.898 DECREASED STRENGTH OF LOWER EXTREMITY: Primary | ICD-10-CM

## 2023-04-04 DIAGNOSIS — M53.86 DECREASED RANGE OF MOTION OF LUMBAR SPINE: ICD-10-CM

## 2023-04-04 PROCEDURE — 97110 THERAPEUTIC EXERCISES: CPT | Mod: PN,CQ

## 2023-04-04 PROCEDURE — 97112 NEUROMUSCULAR REEDUCATION: CPT | Mod: PN,CQ

## 2023-04-04 NOTE — PROGRESS NOTES
Health  Consult Note    Name: Layla Siegel  Clinic Number: [id number]  Physician: No ref. provider found  Past Medical History:   Diagnosis Date    High cholesterol     Hypertension      Time In: 11:30  Time Out: 12:30    Health  Agreement signed: yes    Coaching performed performed: virtual     Subjective:   Patient reports he is cutting back on beef. He feels best about his relationship with his 1 year old. A couple of years ago he lost a bunch of weight, which is a big success to him. He can be determined to be a healthier person when he wants to be. Some elements that he is wishing for is to keep his pressure down, watch his weight, and increase his physical activity. The benefits to making some changes now is being around for his daughter and having more energy. His driving force is to feel like he did a couple years a go. The most important elements of his visions is exercise, eating habits, and self-care. He would have more energy and be consistent when it comes to his health.  He values his family the most. His determination is a major strength. He mainly relies on himself. He does have some resources that he can use like a treadmill, walking his dog, Pt, and health coaching. He also lives close a park and gym. His schedule and working [place] is his only challenge. We were able to create a rough draft if his wellness vision.      Vision:  I will commit to 30 minutes on the treadmill for 3x a week. I plan to prepare my meals for the next day after coming home from work. I will take the time to figure out what self-care means to me schedule time for it.     Values family    Strengths:  determined     Challenges:  schedule     Support:  himself, wife    Hobbies:  car detailing, collecting shoes    Objective:  Layla was instructed to complete the initial health and wellness assessment to go over for next visit.       INITIAL date 4/4/2023  One a scale of 1-10, with 10 being 100% happy, how would you  rate your happiness in each of the wellness areas below?    Happiness:         1     2     3     4     5     6     7     8     9     10    Initial Date: DC Date: +/- Total Change   Exercise/Movement 3     Physical Health 5     Stress Level 6     Nutrition 3     Sleep 2     Play 5     Body Image 3     Relationships 7     Energy/Vitality 1       Assessment:   Patient seems very determined to be healthier for his daughter. He currently is doing some things to live a healthier lifestyle.     Plan:  Patient goals for next consult include to complete the initial health and wellness assessment to go over for next visit.     Health : Guerda Thornton  4/4/2023

## 2023-04-04 NOTE — PROGRESS NOTES
"[facility]      Name: Layla Siegel  Clinic Number: [id number]    Therapy Diagnosis:   Encounter Diagnoses   Name Primary?    Decreased strength of lower extremity Yes    Decreased range of motion of lumbar spine     Acute bilateral low back pain with left-sided sciatica      Physician: Anival Rose MD    Visit Date: 4/4/2023    Physician Orders: PT Eval and Treat  Medical Diagnosis from Referral: M54.50,G89.29 (ICD-10-CM) - Chronic left-sided low back pain without sciatica  Evaluation Date: 3/16/2023  Authorization Period Expiration: 12/31/2023  Plan of Care Expiration: 6/23/2023  Reassessment Due: 4/16/2023  Visit # / Visits authorized: 3/12    Time In: 1307  Time Out: 1401  Total Billable Time: 54 minutes  Insurance type:  Fee for service Insurance Patient    Precautions: Standard    Pattern of pain determined: 4 PEP    Subjective   Layla reports he is feeling a little stiff today    Patient reports tolerating previous visit back was "a little sore"  Patient reports their pain to be 5/10 on a 0-10 scale with 0 being no pain and 10 being the worst pain imaginable.  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 because of the pain- sometimes it can be 4 days sometimes 4 weeks and plans to return   Pt goals: "stop this pain"     Objective     Baseline Isometric Testing on Med X equipment: Testing administered by PT  Date of testing: 3/16/2023  ROM 0-48 deg   Max Peak Torque 118    Min Peak Torque 23    Flex/Ext Ratio 5   % below normative data 71     Foto:  Eval - 35/100        Treatment    Layla received the treatments listed below:     therapeutic exercises to develop strength, endurance, ROM, flexibility, posture, and core stabilization for 31  minutes including:  Neuromuscular re-education: 23 minutes    Treadmill x 6 minutes @ 1.5 mph    Standing exercises:  Lumbar extension 3 " x 10 reps (Neuromuscular re-education)  +Paloff press black therband x 10 reps Right/Left  +Wood chops black theraband x 10 reps Right/Left    Supine exercises:  Lower trunk rotation x 10 reps   Bridging with hip Abduction Green Theraband x 10 reps (Neuromuscular re-education)  Bridging with ball squeezes x 10 reps (Neuromuscular re-education)      Sidelying exercises:   Open books x 10 reps bilateral (Neuromuscular re-education)    Prone exercises:  Press ups x 10 reps with over pressure  On elbows 1'   Alternating arm/leg x 10 reps     Layla received neuromuscular education  to isolate and engage spinal stabilization musculature correctly for motor control and coordination to aid in function and posture for 10 minutes on the Medical Medx Machine.  Patient performed MedX dynamic exercise with emphasis on spinal muscular control using pacer throughout  active range of motion. Therapist assisted patient in achieving optimal exertion for neural reeducation and endurance training by using the  Ron Exertion Rating scale, by instructing the patient to aim for mid range of exertion, performing 15-20 repetitions, slowly, correctly,and safely.       [facility] 4/4/2023   Visit Number 3   VAS Pain Rating 5   Treadmill Time (in min.) 5   Speed 1.5   Incline 0   Extension in Standing 30   Lumbar Extension Seat Pad -   Femur Restraint -   Top Dead Center -   Counterweight -   Lumbar Flexion -   Lumbar Extension -   Lumbar Peak Torque -   Min Torque -   Test Percent Below Normative Data -   Lumbar Weight 48   Repetitions 20   Rating of Perceived Exertion 3   Ice - Z Lie (in min.) -         Peripheral muscle strengthening which included 1 set of 15-20 repetitions at a slow, controlled 10-13 second per rep pace focused on strengthening supporting musculature for improved body mechanics and functional mobility.  Pt and therapist focused on proper form during treatment to ensure optimal strengthening of each targeted  muscle group.  Machines were utilized including torso rotation, leg extension, leg curl, chest press, upright row. Tricep extension, bicep curl, leg press, and hip abduction added visit 3      Home Exercises Provided and Patient Education Provided   Stretching: ongoing  Strengthening: ongoing  Cardio program (V5): 3/23/2023  Lifting education (V11):  Posture/ Using Lumbar Roll:ongoing  Fridge Magnet Discharge handout (date given):    Education provided:   -apply ice as needed for delayed muscle soreness  -Continue with Home exercise program daily     Written Home Exercises Provided: Patient instructed to cont prior HEP.  Exercises were reviewed and Layla was able to demonstrate them prior to the end of the session.  Donnellalid demonstrated good  understanding of the education provided.     See EMR under Patient Instructions for exercises provided prior visit.      Assessment   Donnellalid provided good effort and participation toward therapeutic interventions today with focus on  low back/core strengthening, stability.  Pt did well with exercises without any reports of pain.     Patient is making good progress towards established goals.  Pt will continue to benefit from skilled outpatient physical therapy to address the deficits stated in the impairment chart, provide pt/family education and to maximize pt's level of independence in the home and community environment.     Anticipated Barriers for therapy: none  Pt's spiritual, cultural and educational needs considered and pt agreeable to plan of care and goals as stated below:     Short term goals:  6 weeks or 10 visits   1.  Pt will demonstrate increased lumbar ROM by at least 3 degrees from the initial ROM value with improvements noted in functional ROM and ability to perform ADLs.  (approp and ongoing)  2.  Pt will demonstrate increased MedX average isometric strength value  by 20% from initial test resulting in improved ability to perform bending, lifting, and carrying  activities safely, confidently.  (approp and ongoing)  3.  Patient report a reduction in worst pain score by 1-2 points for improved tolerance for standing and walking.  (approp and ongoing)  4.  Pt able to perform HEP correctly with minimal cueing or supervision from therapist to encourage independent management of symptoms. (approp and ongoing)        Long term goals: 10 weeks or 20 visits   1. Pt will demonstrate increased lumbar ROM by at least 6 degrees from initial ROM value, resulting in improved ability to perform functional fwd bending while standing and sitting. (approp and ongoing)  2. Pt will demonstrate increased MedX average isometric strength value  by 40% from initial test resulting in improved ability to perform bending, lifting, and carrying activities safely, confidently.  (approp and ongoing)  3. Pt to demonstrate ability to independently control and reduce their pain through posture positioning and mechanical movements throughout a typical day.  (approp and ongoing)  4.  Pt will demonstrate reduced pain and improved functional outcomes as reported on the FOTO by reaching a limitation score of < or = 40% or less in order to demonstrate subjective improvement in pt's condition.    (approp and ongoing)  5. Pt will demonstrate independence with the HEP at discharge  (approp and ongoing)  6.  Patient will report >/= 75% improvement in symptoms since evaluation to improve return to job [place]  (approp and ongoing)  7. Patient will demonstrate improvement in SLR ROM prior to onset of tension and symptoms to improve return to PLOF   (approp and ongoing)     Plan   Outpatient physical therapy 2x week for 10 weeks or 20 visits to include the following:   - Patient education  - Therapeutic exercise  - Manual therapy  - Performance testing   - Neuromuscular Re-education  - Therapeutic activity   - Modalities     Pt may be seen by PTA as part of the rehabilitation team.       Renetta Newell  PTA  4/4/2023

## 2023-04-04 NOTE — PROGRESS NOTES
PT/PTA met face to face to discuss pt's treatment plan and progress towards established goals. Pt will be seen by a physical therapist minimally every 6th visit or every 30 days.    Renetta Newell PTA

## 2023-04-10 ENCOUNTER — CLINICAL SUPPORT (OUTPATIENT)
Dept: REHABILITATION | Facility: HOSPITAL | Age: 48
End: 2023-04-10
Payer: COMMERCIAL

## 2023-04-10 DIAGNOSIS — M54.42 ACUTE BILATERAL LOW BACK PAIN WITH LEFT-SIDED SCIATICA: ICD-10-CM

## 2023-04-10 DIAGNOSIS — R29.898 DECREASED STRENGTH OF LOWER EXTREMITY: Primary | ICD-10-CM

## 2023-04-10 DIAGNOSIS — M53.86 DECREASED RANGE OF MOTION OF LUMBAR SPINE: ICD-10-CM

## 2023-04-10 PROCEDURE — 97112 NEUROMUSCULAR REEDUCATION: CPT | Mod: PN

## 2023-04-10 PROCEDURE — 97110 THERAPEUTIC EXERCISES: CPT | Mod: PN

## 2023-04-10 NOTE — PROGRESS NOTES
"[facility]      Name: Layla Siegel  Clinic Number: [id number]    Therapy Diagnosis:   Encounter Diagnoses   Name Primary?    Decreased strength of lower extremity Yes    Decreased range of motion of lumbar spine     Acute bilateral low back pain with left-sided sciatica        Physician: Anival Rose MD    Visit Date: 4/10/2023    Physician Orders: PT Eval and Treat  Medical Diagnosis from Referral: M54.50,G89.29 (ICD-10-CM) - Chronic left-sided low back pain without sciatica  Evaluation Date: 3/16/2023  Authorization Period Expiration: 12/31/2023  Plan of Care Expiration: 6/23/2023  Reassessment Due: 4/16/2023  Visit # / Visits authorized: 4 /12    Time In: 820 AM  Time Out: 915 AM  Total Billable Time: 55 minutes  Insurance type:  Fee for service Insurance Patient    Precautions: Standard    Pattern of pain determined: 4 PEP    Subjective   Layla reports no pain upon arrival. The gabapentin has been helping.     Patient reports tolerating previous visit back was "a little sore"  Patient reports their pain to be 5/10 on a 0-10 scale with 0 being no pain and 10 being the worst pain imaginable.  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 because of the pain- sometimes it can be 4 days sometimes 4 weeks and plans to return   Pt goals: "stop this pain"     Objective     Baseline Isometric Testing on Med X equipment: Testing administered by PT  Date of testing: 3/16/2023  ROM 0-48 deg   Max Peak Torque 118    Min Peak Torque 23    Flex/Ext Ratio 5   % below normative data 71     Foto:  Eval - 35/100    Treatment    Layla received the treatments listed below:     therapeutic exercises to develop strength, endurance, ROM, flexibility, posture, and core stabilization for 30 minutes including:    Treadmill x 6 minutes @ 2.0 mph  Standing lumbar extensions at counter post nerve glides " 15x  Standing extensions free standing   Prone press ups 2 x 15 post nerve glides   Bridges 20x       Peripheral muscle strengthening which included 1 set of 15-20 repetitions at a slow, controlled 10-13 second per rep pace focused on strengthening supporting musculature for improved body mechanics and functional mobility.  Pt and therapist focused on proper form during treatment to ensure optimal strengthening of each targeted muscle group.  Machines were utilized including torso rotation, leg extension, leg curl, chest press, upright row. Tricep extension, bicep curl, leg press, and hip abduction added visit 3    NP:   +Paloff press black therband x 10 reps Right/Left  +Wood chops black theraband x 10 reps Right/Left  Lower trunk rotation x 10 reps   Bridging with hip Abduction Green Theraband x 10 reps (Neuromuscular re-education)    Layla received neuromuscular education  to isolate and engage spinal stabilization musculature correctly for motor control and coordination to aid in function and posture for 10 minutes on the Medical Medx Machine.  Patient performed MedX dynamic exercise with emphasis on spinal muscular control using pacer throughout  active range of motion. Therapist assisted patient in achieving optimal exertion for neural reeducation and endurance training by using the  Ron Exertion Rating scale, by instructing the patient to aim for mid range of exertion, performing 15-20 repetitions, slowly, correctly,and safely.       [facility] 4/10/2023   Visit Number 4   VAS Pain Rating 0   Treadmill Time (in min.) 5   Speed 2   Incline -   Extension in Standing -   Lumbar Extension Seat Pad -   Femur Restraint -   Top Dead Center -   Counterweight -   Lumbar Flexion -   Lumbar Extension -   Lumbar Peak Torque -   Min Torque -   Test Percent Below Normative Data -   Lumbar Weight 60   Repetitions 20   Rating of Perceived Exertion 3   Ice - Z Lie (in min.) -       Layla participated in  neuromuscular re-education activities to improve: Balance, Coordination, Kinesthetic, Sense, Proprioception, and Posture for 15 minutes. The following activities were included:    Open books 15x each side   Seated sciatic nerve sliders 2 x 15   Supine sciatic nerve sliders    1 set manipulation at the foot   1 set manipulation at the knee     Home Exercises Provided and Patient Education Provided   Stretching: ongoing  Strengthening: ongoing  Cardio program (V5): 3/23/2023  Lifting education (V11):  Posture/ Using Lumbar Roll:ongoing  Fridge Magnet Discharge handout (date given):    Education provided:     Perform standing or prone extensions pre and post sciatic nerve glides     Written Home Exercises Provided: Patient instructed to cont prior HEP.  Exercises were reviewed and Layla was able to demonstrate them prior to the end of the session.  Layla demonstrated good  understanding of the education provided.     See EMR under Patient Instructions for exercises provided prior visit.      Assessment     Arrived without pain today. He reported the gabapentin has been working. Tx session focused on sciatic nerve glides and sliders. He continues to demonstrate sciatic nerve tension of L > R. Following multiple set of nerve sliders in multiple positions, standing extensions performed. After walking to the medx machine he reported glut symptoms, so performed free standing extensions with improvement in glut symptoms reported. Physical Therapist educated pt to perform extensions pre and post nerve glide at home to prevent onset of radicular/glut symptoms. Patient verbalized good understanding. Good tolerance to medx weight at 50% max. He is appropriate for increase in weight at next tx session.     Patient is making good progress towards established goals.  Pt will continue to benefit from skilled outpatient physical therapy to address the deficits stated in the impairment chart, provide pt/family education and to  maximize pt's level of independence in the home and community environment.     Anticipated Barriers for therapy: none  Pt's spiritual, cultural and educational needs considered and pt agreeable to plan of care and goals as stated below:     Short term goals:  6 weeks or 10 visits   1.  Pt will demonstrate increased lumbar ROM by at least 3 degrees from the initial ROM value with improvements noted in functional ROM and ability to perform ADLs.  (approp and ongoing)  2.  Pt will demonstrate increased MedX average isometric strength value  by 20% from initial test resulting in improved ability to perform bending, lifting, and carrying activities safely, confidently.  (approp and ongoing)  3.  Patient report a reduction in worst pain score by 1-2 points for improved tolerance for standing and walking.  (approp and ongoing)  4.  Pt able to perform HEP correctly with minimal cueing or supervision from therapist to encourage independent management of symptoms. (approp and ongoing)        Long term goals: 10 weeks or 20 visits   1. Pt will demonstrate increased lumbar ROM by at least 6 degrees from initial ROM value, resulting in improved ability to perform functional fwd bending while standing and sitting. (approp and ongoing)  2. Pt will demonstrate increased MedX average isometric strength value  by 40% from initial test resulting in improved ability to perform bending, lifting, and carrying activities safely, confidently.  (approp and ongoing)  3. Pt to demonstrate ability to independently control and reduce their pain through posture positioning and mechanical movements throughout a typical day.  (approp and ongoing)  4.  Pt will demonstrate reduced pain and improved functional outcomes as reported on the FOTO by reaching a limitation score of < or = 40% or less in order to demonstrate subjective improvement in pt's condition.    (approp and ongoing)  5. Pt will demonstrate independence with the HEP at discharge   (approp and ongoing)  6.  Patient will report >/= 75% improvement in symptoms since evaluation to improve [place]  (approp and ongoing)  7. Patient will demonstrate improvement in SLR ROM prior to onset of tension and symptoms to improve return to PLOF   (approp and ongoing)     Plan   Outpatient physical therapy 2x week for 10 weeks or 20 visits to include the following:   - Patient education  - Therapeutic exercise  - Manual therapy  - Performance testing   - Neuromuscular Re-education  - Therapeutic activity   - Modalities     Pt may be seen by PTA as part of the rehabilitation team.       Lila Lucia, PT  4/10/2023

## 2023-04-18 ENCOUNTER — CLINICAL SUPPORT (OUTPATIENT)
Dept: REHABILITATION | Facility: HOSPITAL | Age: 48
End: 2023-04-18
Payer: COMMERCIAL

## 2023-04-18 DIAGNOSIS — R29.898 DECREASED STRENGTH OF LOWER EXTREMITY: Primary | ICD-10-CM

## 2023-04-18 DIAGNOSIS — M53.86 DECREASED RANGE OF MOTION OF LUMBAR SPINE: ICD-10-CM

## 2023-04-18 DIAGNOSIS — M54.42 ACUTE BILATERAL LOW BACK PAIN WITH LEFT-SIDED SCIATICA: ICD-10-CM

## 2023-04-18 PROCEDURE — 97110 THERAPEUTIC EXERCISES: CPT | Mod: PN,CQ

## 2023-04-18 PROCEDURE — 97112 NEUROMUSCULAR REEDUCATION: CPT | Mod: PN,CQ

## 2023-04-18 NOTE — PROGRESS NOTES
"[facility]      Name: Layla Siegel  Clinic Number: [id number]    Therapy Diagnosis:   Encounter Diagnoses   Name Primary?    Decreased strength of lower extremity Yes    Decreased range of motion of lumbar spine     Acute bilateral low back pain with left-sided sciatica          Physician: Anival Rose MD    Visit Date: 4/18/2023    Physician Orders: PT Eval and Treat  Medical Diagnosis from Referral: M54.50,G89.29 (ICD-10-CM) - Chronic left-sided low back pain without sciatica  Evaluation Date: 3/16/2023  Authorization Period Expiration: 12/31/2023  Plan of Care Expiration: 6/23/2023  Reassessment Due: 4/16/2023  Visit # / Visits authorized:  5/12  PTA visit # 1/5    Time In: 8:07 AM  Time Out: 9:00 AM  Total Billable Time: 53 minutes  Insurance type:  Fee for service Insurance Patient    Precautions: Standard    Pattern of pain determined: 4 PEP    Subjective   Layla reports he is not having any pain this morning but last night he had a sharp pain in his back and Left side.  Patient reports tolerating previous visit back was "a little sore"  Patient reports their pain to be 0/10 on a 0-10 scale with 0 being no pain and 10 being the worst pain imaginable.  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 because of the pain- sometimes it can be 4 days sometimes 4 weeks and plans to return   Pt goals: "stop this pain"     Objective     Baseline Isometric Testing on Med X equipment: Testing administered by PT  Date of testing: 3/16/2023  ROM 0-48 deg   Max Peak Torque 118    Min Peak Torque 23    Flex/Ext Ratio 5   % below normative data 71     Foto:  Eval - 35/100    Treatment    Layla received the treatments listed below:     therapeutic exercises to develop strength, endurance, ROM, flexibility, posture, and core stabilization for 28 minutes including:    Treadmill x 6 minutes @ 2.0 " mph  Standing lumbar extensions at counter post nerve glides x 15 reps   Standing extensions free standing x 15 reps   Prone press ups  x 15 reps   Bridges x 20 reps     Peripheral muscle strengthening which included 1 set of 15-20 repetitions at a slow, controlled 10-13 second per rep pace focused on strengthening supporting musculature for improved body mechanics and functional mobility.  Pt and therapist focused on proper form during treatment to ensure optimal strengthening of each targeted muscle group.  Machines were utilized including torso rotation, leg extension, leg curl, chest press, upright row. Tricep extension, bicep curl, leg press, and hip abduction added visit 3    NP:   +Paloff press black therband x 10 reps Right/Left  +Wood chops black theraband x 10 reps Right/Left  Lower trunk rotation x 10 reps   Bridging with hip Abduction Green Theraband x 10 reps (Neuromuscular re-education)    [facility] 4/18/2023   Visit Number 5   VAS Pain Rating 0   Treadmill Time (in min.) 6   Speed 2   Incline -   Extension in Standing 30   Lumbar Extension Seat Pad -   Femur Restraint -   Top Dead Center -   Counterweight -   Lumbar Flexion -   Lumbar Extension -   Lumbar Peak Torque -   Min Torque -   Test Percent Below Normative Data -   Lumbar Weight 63   Repetitions 20   Rating of Perceived Exertion 5   Ice - Z Lie (in min.) -         Layla received neuromuscular education  to isolate and engage spinal stabilization musculature correctly for motor control and coordination to aid in function and posture for 10 minutes on the Medical Medx Machine.  Patient performed MedX dynamic exercise with emphasis on spinal muscular control using pacer throughout  active range of motion. Therapist assisted patient in achieving optimal exertion for neural reeducation and endurance training by using the  Ron Exertion Rating scale, by instructing the patient to aim for mid range of exertion, performing 15-20  repetitions, slowly, correctly,and safely.         Layla participated in neuromuscular re-education activities to improve: Balance, Coordination, Kinesthetic, Sense, Proprioception, and Posture for 15 minutes. The following activities were included:    Open books x 15 reps each side   Seated sciatic nerve sliders 2 x 15 reps   Supine sciatic nerve sliders x 10 reps each   1 set manipulation at the foot   1 set manipulation at the knee     Home Exercises Provided and Patient Education Provided   Stretching: ongoing  Strengthening: ongoing  Cardio program (V5): 3/23/2023  Lifting education (V11):  Posture/ Using Lumbar Roll:ongoing  Fridge Magnet Discharge handout (date given):    Education provided:   Perform standing or prone extensions pre and post sciatic nerve glides     Written Home Exercises Provided: Patient instructed to cont prior HEP.  Exercises were reviewed and Layla was able to demonstrate them prior to the end of the session.  Layla demonstrated good  understanding of the education provided.     See EMR under Patient Instructions for exercises provided prior visit.      Assessment   Pt presents to PT this morning with no pain but did report increased pain last night.  Pt described pain as sharp and radiating into his Left side.  Pt was able to perform all his exercises without difficulty or reported increased pain.  Pt encouraged to perform extension exercises at home.  Pt continues to rate exercises on peripheral machines high (4-7).  No increase in resistance today except on medX.    Patient is making good progress towards established goals.  Pt will continue to benefit from skilled outpatient physical therapy to address the deficits stated in the impairment chart, provide pt/family education and to maximize pt's level of independence in the home and community environment.     Anticipated Barriers for therapy: none  Pt's spiritual, cultural and educational needs considered and pt agreeable to plan  of care and goals as stated below:     Short term goals:  6 weeks or 10 visits   1.  Pt will demonstrate increased lumbar ROM by at least 3 degrees from the initial ROM value with improvements noted in functional ROM and ability to perform ADLs.  (approp and ongoing)  2.  Pt will demonstrate increased MedX average isometric strength value  by 20% from initial test resulting in improved ability to perform bending, lifting, and carrying activities safely, confidently.  (approp and ongoing)  3.  Patient report a reduction in worst pain score by 1-2 points for improved tolerance for standing and walking.  (approp and ongoing)  4.  Pt able to perform HEP correctly with minimal cueing or supervision from therapist to encourage independent management of symptoms. (approp and ongoing)        Long term goals: 10 weeks or 20 visits   1. Pt will demonstrate increased lumbar ROM by at least 6 degrees from initial ROM value, resulting in improved ability to perform functional fwd bending while standing and sitting. (approp and ongoing)  2. Pt will demonstrate increased MedX average isometric strength value  by 40% from initial test resulting in improved ability to perform bending, lifting, and carrying activities safely, confidently.  (approp and ongoing)  3. Pt to demonstrate ability to independently control and reduce their pain through posture positioning and mechanical movements throughout a typical day.  (approp and ongoing)  4.  Pt will demonstrate reduced pain and improved functional outcomes as reported on the FOTO by reaching a limitation score of < or = 40% or less in order to demonstrate subjective improvement in pt's condition.    (approp and ongoing)  5. Pt will demonstrate independence with the HEP at discharge  (approp and ongoing)  6.  Patient will report >/= 75% improvement in symptoms since evaluation to improve return to [place]  (approp and ongoing)  7. Patient will demonstrate improvement in SLR  ROM prior to onset of tension and symptoms to improve return to PLOF   (approp and ongoing)     Plan   Outpatient physical therapy 2x week for 10 weeks or 20 visits to include the following:   - Patient education  - Therapeutic exercise  - Manual therapy  - Performance testing   - Neuromuscular Re-education  - Therapeutic activity   - Modalities     Pt may be seen by PTA as part of the rehabilitation team.       Renetta Newell, PTA  4/18/2023

## 2023-04-21 ENCOUNTER — DOCUMENTATION ONLY (OUTPATIENT)
Dept: REHABILITATION | Facility: HOSPITAL | Age: 48
End: 2023-04-21

## 2023-04-21 NOTE — PROGRESS NOTES
Health  Consult Note    Name: Layla Siegel  Clinic Number: [id number]  Physician: No ref. provider found  Past Medical History:   Diagnosis Date    High cholesterol     Hypertension      Time In: 11:30  Time Out: 12:30    Health  Agreement signed: yes    Coaching performed performed: virtual     Subjective:   Patient reports he is cutting back on beef. He feels best about his relationship with his 1 year old. A couple of years ago he lost a bunch of weight, which is a big success to him. Some thing positive that happened is his table came in. He talked to his doctor about switching his medicine because it is hard to wake up. Today we will work on his three months goals. For the first month, he would like to work increase his walking. He is currently walking his dog for 10 minutes a day, the dog is 30 minutes. Some actions are not optional, consistent, an alarm will help. You plan walking your dog everyday for 30 minutes, for the treadmill he will walk for 30 minutes M-F before work. For the next month, he would like to prep lunch and breakfast M-F. Some actions are writing a list and going to the store( goes every 3 days). He would like to get more protein in. We will work on self-care. He would like to work on putting together some chairs today after therapy, he also wants to work on some yard work. He is confident that he will be able to get it done.     Vision:  I will commit to 30 minutes on the treadmill for 3x a week. I plan to prepare my meals for the next day after coming home from work. I will take the time to figure out what self-care means to me schedule time for it.     Values family    Strengths:  determined     Challenges:  schedule     Support:  himself, wife    Hobbies:  car detailing, collecting shoes    Objective:  Layla was instructed to complete the initial health and wellness assessment to go over for next visit.       INITIAL date 4/4/2023  One a scale of 1-10, with 10 being 100%  happy, how would you rate your happiness in each of the wellness areas below?    Happiness:         1     2     3     4     5     6     7     8     9     10    Initial Date: DC Date: +/- Total Change   Exercise/Movement 3     Physical Health 5     Stress Level 6     Nutrition 3     Sleep 2     Play 5     Body Image 3     Relationships 7     Energy/Vitality 1       Assessment:   Patient seems very determined to be healthier for his daughter. He currently is doing some things to live a healthier lifestyle. He would benefit from creating a routine and using a calendar to stay on top of things like his diet and exercise.     Plan:  Patient goals for next consult include to complete the initial health and wellness assessment to go over for next visit.     Health : Guerda Thornton  4/21/2023

## 2023-04-25 ENCOUNTER — PATIENT MESSAGE (OUTPATIENT)
Dept: REHABILITATION | Facility: HOSPITAL | Age: 48
End: 2023-04-25

## 2023-04-25 ENCOUNTER — DOCUMENTATION ONLY (OUTPATIENT)
Dept: REHABILITATION | Facility: HOSPITAL | Age: 48
End: 2023-04-25
Payer: COMMERCIAL

## 2023-04-25 NOTE — PROGRESS NOTES
Health  Consult Note    Name: Layla Siegel  Clinic Number: [id number]  Physician: No ref. provider found  Past Medical History:   Diagnosis Date    High cholesterol     Hypertension      Time In: 11:30  Time Out: 12:30    Health  Agreement signed: yes    Coaching performed performed: virtual     Subjective:   Patient reports he had a good weekend. They went to the city for the weekend, he did a paddle boat for 2 hours. He had a really good time. Pt was able to complete one of his goals this past week. He is waiting on the contractor so he can complete the other goal. This week he wants to focus on getting what is needed from the store to start meal prepping. He has check list (his wife) to help with the store. What would work best for him is getting the already made salads for lunch a couple of days, then have where he can make his own for the rest. He will also eat yogurt for breakfast. Towards the end of the week he will put the make your own salad in Tupperware. He would like to work on portion control. He needs to change his diet due to stomach issues. HC will look up some recipes for him.       Vision:  I will commit to 30 minutes on the treadmill for 3x a week. I plan to prepare my meals for the next day after coming home from work. I will take the time to figure out what self-care means to me schedule time for it.     Values family    Strengths:  determined     Challenges:  schedule     Support:  himself, wife    Hobbies:  car detailing, collecting shoes    Objective:  Layla was instructed to complete the initial health and wellness assessment to go over for next visit.       INITIAL date 4/4/2023  One a scale of 1-10, with 10 being 100% happy, how would you rate your happiness in each of the wellness areas below?    Happiness:         1     2     3     4     5     6     7     8     9     10    Initial Date: DC Date: +/- Total Change   Exercise/Movement 3     Physical Health 5     Stress Level 6      Nutrition 3     Sleep 2     Play 5     Body Image 3     Relationships 7     Energy/Vitality 1       Assessment:   Patient seems very determined to be healthier for his daughter. He currently is doing some things to live a healthier lifestyle. He would benefit from creating a routine and using a calendar to stay on top of things like his diet and exercise.     Plan:  Patient goals for next consult include to complete the initial health and wellness assessment to go over for next visit.     Health : Guerda Thornton  4/25/2023

## 2023-05-15 DIAGNOSIS — M54.50 CHRONIC LEFT-SIDED LOW BACK PAIN WITHOUT SCIATICA: ICD-10-CM

## 2023-05-15 DIAGNOSIS — G89.29 CHRONIC LEFT-SIDED LOW BACK PAIN WITHOUT SCIATICA: ICD-10-CM

## 2023-05-16 ENCOUNTER — DOCUMENTATION ONLY (OUTPATIENT)
Dept: REHABILITATION | Facility: HOSPITAL | Age: 48
End: 2023-05-16
Payer: COMMERCIAL

## 2023-05-16 RX ORDER — DICLOFENAC SODIUM 50 MG/1
TABLET, DELAYED RELEASE ORAL
Qty: 60 TABLET | Refills: 0 | Status: SHIPPED | OUTPATIENT
Start: 2023-05-16 | End: 2023-12-21 | Stop reason: ALTCHOICE

## 2023-05-16 NOTE — PROGRESS NOTES
Health  Consult Note    Name: Layla Siegel  Clinic Number: [id number]  Physician: No ref. provider found  Past Medical History:   Diagnosis Date    High cholesterol     Hypertension      Time In: 11:30  Time Out: 12:30    Health  Agreement signed: yes    Coaching performed performed: virtual     Subjective:   Patient reports he got back from off show on Thursday and is feeling good. He had slight pain after cutting his grass. Pt reports that he did over night oats and salads- he has had no issues from doing this. He is taking pain medicine which is helping with bathroom issue. He plans to be home for a while. This week, he wants to focus on more being more active. He plans to set earlier alarm and get out of bed earlier. After he will hop on the treadmill for 30 minutes on Tuesday and Saturday. He figured out what self-care means to him, he sits on the  and relax's for about 15 minutes- he notices that he bas been doing that more often since being home. He feels good about his goals.       Vision:  I will commit to 30 minutes on the treadmill for 3x a week. I plan to prepare my meals for the next day after coming home from work. I will take the time to figure out what self-care means to me schedule time for it.     Values family    Strengths:  determined     Challenges:  schedule     Support:  himself, wife    Hobbies:  car detailing, collecting shoes    Objective:  Layla was instructed to complete the initial health and wellness assessment to go over for next visit.       INITIAL date 4/4/2023  One a scale of 1-10, with 10 being 100% happy, how would you rate your happiness in each of the wellness areas below?    Happiness:         1     2     3     4     5     6     7     8     9     10    Initial Date: MS Date: +/- Total Change   Exercise/Movement 3     Physical Health 5     Stress Level 6     Nutrition 3     Sleep 2     Play 5     Body Image 3     Relationships 7     Energy/Vitality 1        Assessment:   Patient seems very determined to be healthier for his daughter. He currently is doing some things to live a healthier lifestyle. He would benefit from creating a routine and using a calendar to stay on top of things like his diet and exercise.     Plan:  Patient goals for next consult include to complete the initial health and wellness assessment to go over for next visit.     Health : Guerda Thornton  5/16/2023

## 2023-06-20 ENCOUNTER — LAB VISIT (OUTPATIENT)
Dept: LAB | Facility: HOSPITAL | Age: 48
End: 2023-06-20
Attending: INTERNAL MEDICINE
Payer: COMMERCIAL

## 2023-06-20 ENCOUNTER — OFFICE VISIT (OUTPATIENT)
Dept: FAMILY MEDICINE | Facility: CLINIC | Age: 48
End: 2023-06-20
Payer: COMMERCIAL

## 2023-06-20 VITALS
WEIGHT: 263.88 LBS | SYSTOLIC BLOOD PRESSURE: 118 MMHG | DIASTOLIC BLOOD PRESSURE: 76 MMHG | BODY MASS INDEX: 34.97 KG/M2 | HEIGHT: 73 IN | OXYGEN SATURATION: 98 % | HEART RATE: 82 BPM | TEMPERATURE: 98 F

## 2023-06-20 DIAGNOSIS — E88.810 METABOLIC SYNDROME: ICD-10-CM

## 2023-06-20 DIAGNOSIS — Z98.84 S/P GASTRIC BYPASS: ICD-10-CM

## 2023-06-20 DIAGNOSIS — I10 ESSENTIAL HYPERTENSION: Primary | ICD-10-CM

## 2023-06-20 DIAGNOSIS — I10 ESSENTIAL HYPERTENSION: ICD-10-CM

## 2023-06-20 PROBLEM — E66.01 SEVERE OBESITY (BMI 35.0-39.9) WITH COMORBIDITY: Status: RESOLVED | Noted: 2022-09-22 | Resolved: 2023-06-20

## 2023-06-20 LAB
ALBUMIN SERPL BCP-MCNC: 3.9 G/DL (ref 3.5–5.2)
ALP SERPL-CCNC: 68 U/L (ref 55–135)
ALT SERPL W/O P-5'-P-CCNC: 100 U/L (ref 10–44)
ANION GAP SERPL CALC-SCNC: 11 MMOL/L (ref 8–16)
AST SERPL-CCNC: 41 U/L (ref 10–40)
BASOPHILS # BLD AUTO: 0.02 K/UL (ref 0–0.2)
BASOPHILS NFR BLD: 0.4 % (ref 0–1.9)
BILIRUB SERPL-MCNC: 0.6 MG/DL (ref 0.1–1)
BUN SERPL-MCNC: 15 MG/DL (ref 6–20)
CALCIUM SERPL-MCNC: 9.8 MG/DL (ref 8.7–10.5)
CHLORIDE SERPL-SCNC: 97 MMOL/L (ref 95–110)
CO2 SERPL-SCNC: 30 MMOL/L (ref 23–29)
CREAT SERPL-MCNC: 1 MG/DL (ref 0.5–1.4)
DIFFERENTIAL METHOD: ABNORMAL
EOSINOPHIL # BLD AUTO: 0.2 K/UL (ref 0–0.5)
EOSINOPHIL NFR BLD: 3 % (ref 0–8)
ERYTHROCYTE [DISTWIDTH] IN BLOOD BY AUTOMATED COUNT: 12.8 % (ref 11.5–14.5)
EST. GFR  (NO RACE VARIABLE): >60 ML/MIN/1.73 M^2
ESTIMATED AVG GLUCOSE: 120 MG/DL (ref 68–131)
GLUCOSE SERPL-MCNC: 89 MG/DL (ref 70–110)
HBA1C MFR BLD: 5.8 % (ref 4–5.6)
HCT VFR BLD AUTO: 37.9 % (ref 40–54)
HGB BLD-MCNC: 13.3 G/DL (ref 14–18)
IMM GRANULOCYTES # BLD AUTO: 0.01 K/UL (ref 0–0.04)
IMM GRANULOCYTES NFR BLD AUTO: 0.2 % (ref 0–0.5)
LYMPHOCYTES # BLD AUTO: 1.8 K/UL (ref 1–4.8)
LYMPHOCYTES NFR BLD: 35.1 % (ref 18–48)
MCH RBC QN AUTO: 30.2 PG (ref 27–31)
MCHC RBC AUTO-ENTMCNC: 35.1 G/DL (ref 32–36)
MCV RBC AUTO: 86 FL (ref 82–98)
MONOCYTES # BLD AUTO: 0.5 K/UL (ref 0.3–1)
MONOCYTES NFR BLD: 10.6 % (ref 4–15)
NEUTROPHILS # BLD AUTO: 2.5 K/UL (ref 1.8–7.7)
NEUTROPHILS NFR BLD: 50.7 % (ref 38–73)
NRBC BLD-RTO: 0 /100 WBC
PLATELET # BLD AUTO: 233 K/UL (ref 150–450)
PMV BLD AUTO: 9.4 FL (ref 9.2–12.9)
POTASSIUM SERPL-SCNC: 3.4 MMOL/L (ref 3.5–5.1)
PROT SERPL-MCNC: 7.9 G/DL (ref 6–8.4)
RBC # BLD AUTO: 4.41 M/UL (ref 4.6–6.2)
SODIUM SERPL-SCNC: 138 MMOL/L (ref 136–145)
WBC # BLD AUTO: 5.01 K/UL (ref 3.9–12.7)

## 2023-06-20 PROCEDURE — 1159F PR MEDICATION LIST DOCUMENTED IN MEDICAL RECORD: ICD-10-PCS | Mod: CPTII,S$GLB,, | Performed by: INTERNAL MEDICINE

## 2023-06-20 PROCEDURE — 85025 COMPLETE CBC W/AUTO DIFF WBC: CPT | Performed by: INTERNAL MEDICINE

## 2023-06-20 PROCEDURE — 99213 PR OFFICE/OUTPT VISIT, EST, LEVL III, 20-29 MIN: ICD-10-PCS | Mod: S$GLB,,, | Performed by: INTERNAL MEDICINE

## 2023-06-20 PROCEDURE — 3074F PR MOST RECENT SYSTOLIC BLOOD PRESSURE < 130 MM HG: ICD-10-PCS | Mod: CPTII,S$GLB,, | Performed by: INTERNAL MEDICINE

## 2023-06-20 PROCEDURE — 1159F MED LIST DOCD IN RCRD: CPT | Mod: CPTII,S$GLB,, | Performed by: INTERNAL MEDICINE

## 2023-06-20 PROCEDURE — 3074F SYST BP LT 130 MM HG: CPT | Mod: CPTII,S$GLB,, | Performed by: INTERNAL MEDICINE

## 2023-06-20 PROCEDURE — 99999 PR PBB SHADOW E&M-EST. PATIENT-LVL IV: CPT | Mod: PBBFAC,,, | Performed by: INTERNAL MEDICINE

## 2023-06-20 PROCEDURE — 1160F PR REVIEW ALL MEDS BY PRESCRIBER/CLIN PHARMACIST DOCUMENTED: ICD-10-PCS | Mod: CPTII,S$GLB,, | Performed by: INTERNAL MEDICINE

## 2023-06-20 PROCEDURE — 3008F BODY MASS INDEX DOCD: CPT | Mod: CPTII,S$GLB,, | Performed by: INTERNAL MEDICINE

## 2023-06-20 PROCEDURE — 3078F DIAST BP <80 MM HG: CPT | Mod: CPTII,S$GLB,, | Performed by: INTERNAL MEDICINE

## 2023-06-20 PROCEDURE — 1160F RVW MEDS BY RX/DR IN RCRD: CPT | Mod: CPTII,S$GLB,, | Performed by: INTERNAL MEDICINE

## 2023-06-20 PROCEDURE — 99999 PR PBB SHADOW E&M-EST. PATIENT-LVL IV: ICD-10-PCS | Mod: PBBFAC,,, | Performed by: INTERNAL MEDICINE

## 2023-06-20 PROCEDURE — 3078F PR MOST RECENT DIASTOLIC BLOOD PRESSURE < 80 MM HG: ICD-10-PCS | Mod: CPTII,S$GLB,, | Performed by: INTERNAL MEDICINE

## 2023-06-20 PROCEDURE — 80053 COMPREHEN METABOLIC PANEL: CPT | Performed by: INTERNAL MEDICINE

## 2023-06-20 PROCEDURE — 99213 OFFICE O/P EST LOW 20 MIN: CPT | Mod: S$GLB,,, | Performed by: INTERNAL MEDICINE

## 2023-06-20 PROCEDURE — 36415 COLL VENOUS BLD VENIPUNCTURE: CPT | Mod: PN | Performed by: INTERNAL MEDICINE

## 2023-06-20 PROCEDURE — 83036 HEMOGLOBIN GLYCOSYLATED A1C: CPT | Performed by: INTERNAL MEDICINE

## 2023-06-20 PROCEDURE — 3008F PR BODY MASS INDEX (BMI) DOCUMENTED: ICD-10-PCS | Mod: CPTII,S$GLB,, | Performed by: INTERNAL MEDICINE

## 2023-06-20 NOTE — PROGRESS NOTES
"Subjective:       Patient ID: Layla Siegel is a 47 y.o. male.    Chief Complaint: Follow-up (6 Months follow up)    F/u chronic conditions    HPI: 46 y/o w/ HTN metabolic syndrome presents alone for scheduled follow up. He underwent gastric bypass one week ago (June 13 2023) has been following bariatric diet taking MVI including oral dissolvable b12 stool loose no orthostatic symptoms no LE swelling breathing "fine"     Review of Systems   Constitutional:  Negative for activity change, appetite change, fatigue, fever and unexpected weight change.   HENT:  Negative for ear pain, rhinorrhea and sore throat.    Eyes:  Negative for discharge and visual disturbance.   Respiratory:  Negative for chest tightness, shortness of breath and wheezing.    Cardiovascular:  Negative for chest pain, palpitations and leg swelling.   Gastrointestinal:  Negative for abdominal pain, constipation and diarrhea.   Endocrine: Negative for cold intolerance and heat intolerance.   Genitourinary:  Negative for dysuria and hematuria.   Musculoskeletal:  Negative for joint swelling and neck stiffness.   Skin:  Negative for rash.   Neurological:  Negative for dizziness, syncope, weakness and headaches.   Psychiatric/Behavioral:  Negative for suicidal ideas.      Objective:     Vitals:    06/20/23 0902   BP: 118/76   BP Location: Left arm   Patient Position: Sitting   BP Method: Large (Manual)   Pulse: 82   Temp: 98 °F (36.7 °C)   TempSrc: Oral   SpO2: 98%   Weight: 119.7 kg (263 lb 14.3 oz)   Height: 6' 1" (1.854 m)          Physical Exam  Constitutional:       Appearance: He is well-developed.   HENT:      Head: Normocephalic and atraumatic.   Eyes:      Conjunctiva/sclera: Conjunctivae normal.   Cardiovascular:      Rate and Rhythm: Normal rate and regular rhythm.      Heart sounds: No murmur heard.    No friction rub. No gallop.   Pulmonary:      Effort: Pulmonary effort is normal. No respiratory distress.      Breath sounds: Normal breath " sounds. No wheezing or rales.   Abdominal:      General: There is no distension.      Palpations: Abdomen is soft.      Tenderness: There is no abdominal tenderness. There is no guarding or rebound.   Musculoskeletal:         General: No tenderness. Normal range of motion.      Cervical back: Normal range of motion.      Right lower leg: No edema.      Left lower leg: No edema.   Skin:     General: Skin is warm and dry.   Neurological:      Mental Status: He is alert and oriented to person, place, and time.      Cranial Nerves: No cranial nerve deficit.       Assessment and Plan   1. Essential hypertension  Bp at goal sanya discontinue ccb in light of weight loss labs today    2. Metabolic syndrome  Discontinue metformin conitnue bariatric diet check a1c today    3. S/P gastric bypass  Follow up with surgeon as scheduled continue daily MVI and b12 supplementation

## 2023-06-29 ENCOUNTER — DOCUMENTATION ONLY (OUTPATIENT)
Dept: REHABILITATION | Facility: HOSPITAL | Age: 48
End: 2023-06-29
Payer: COMMERCIAL

## 2023-06-29 NOTE — PROGRESS NOTES
Patient is being discharged from skilled Physical Therapist services at this time 2/2 no return following last tx session on 4/18/2023 for unknown reasons.     Lila Lucia, PT

## 2023-07-27 ENCOUNTER — PATIENT OUTREACH (OUTPATIENT)
Dept: ADMINISTRATIVE | Facility: HOSPITAL | Age: 48
End: 2023-07-27
Payer: COMMERCIAL

## 2023-07-27 NOTE — PROGRESS NOTES
Microalbumin and DM eye exam not needed due to pt being prediabetic. Gap report updated. Immunization's updated/triggered.

## 2023-08-22 ENCOUNTER — TELEPHONE (OUTPATIENT)
Dept: FAMILY MEDICINE | Facility: CLINIC | Age: 48
End: 2023-08-22
Payer: COMMERCIAL

## 2023-08-22 NOTE — TELEPHONE ENCOUNTER
Pt will go to urgent care to address sinus issues due to no appointments before he goes out of town on Thursday for work.
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
## 2023-09-13 ENCOUNTER — DOCUMENTATION ONLY (OUTPATIENT)
Dept: REHABILITATION | Facility: HOSPITAL | Age: 48
End: 2023-09-13
Payer: COMMERCIAL

## 2023-09-13 PROBLEM — R29.898 DECREASED STRENGTH OF LOWER EXTREMITY: Status: RESOLVED | Noted: 2023-03-16 | Resolved: 2023-09-13

## 2023-09-13 PROBLEM — M54.42 ACUTE BILATERAL LOW BACK PAIN WITH LEFT-SIDED SCIATICA: Status: RESOLVED | Noted: 2023-03-16 | Resolved: 2023-09-13

## 2023-09-13 PROBLEM — M53.86 DECREASED RANGE OF MOTION OF LUMBAR SPINE: Status: RESOLVED | Noted: 2023-03-16 | Resolved: 2023-09-13

## 2023-09-13 NOTE — PROGRESS NOTES
Patient is being Discharged from skilled PT at this time due to self DC from PT.  Attended 5 visits.

## 2023-10-09 ENCOUNTER — PATIENT OUTREACH (OUTPATIENT)
Dept: ADMINISTRATIVE | Facility: HOSPITAL | Age: 48
End: 2023-10-09
Payer: COMMERCIAL

## 2023-11-30 ENCOUNTER — PATIENT OUTREACH (OUTPATIENT)
Dept: ADMINISTRATIVE | Facility: HOSPITAL | Age: 48
End: 2023-11-30
Payer: COMMERCIAL

## 2023-12-21 ENCOUNTER — OFFICE VISIT (OUTPATIENT)
Dept: FAMILY MEDICINE | Facility: CLINIC | Age: 48
End: 2023-12-21
Payer: COMMERCIAL

## 2023-12-21 VITALS
OXYGEN SATURATION: 99 % | WEIGHT: 211.63 LBS | HEIGHT: 73 IN | DIASTOLIC BLOOD PRESSURE: 68 MMHG | BODY MASS INDEX: 28.05 KG/M2 | SYSTOLIC BLOOD PRESSURE: 128 MMHG | TEMPERATURE: 98 F | HEART RATE: 70 BPM

## 2023-12-21 DIAGNOSIS — R73.01 IMPAIRED FASTING GLUCOSE: ICD-10-CM

## 2023-12-21 DIAGNOSIS — Z98.84 S/P GASTRIC BYPASS: Primary | ICD-10-CM

## 2023-12-21 DIAGNOSIS — Z01.00 ROUTINE EYE EXAM: ICD-10-CM

## 2023-12-21 DIAGNOSIS — M54.41 ACUTE BILATERAL LOW BACK PAIN WITH RIGHT-SIDED SCIATICA: ICD-10-CM

## 2023-12-21 PROCEDURE — 99213 PR OFFICE/OUTPT VISIT, EST, LEVL III, 20-29 MIN: ICD-10-PCS | Mod: S$GLB,,, | Performed by: INTERNAL MEDICINE

## 2023-12-21 PROCEDURE — 99999 PR PBB SHADOW E&M-EST. PATIENT-LVL V: CPT | Mod: PBBFAC,,, | Performed by: INTERNAL MEDICINE

## 2023-12-21 PROCEDURE — 99999 PR PBB SHADOW E&M-EST. PATIENT-LVL V: ICD-10-PCS | Mod: PBBFAC,,, | Performed by: INTERNAL MEDICINE

## 2023-12-21 PROCEDURE — 3008F PR BODY MASS INDEX (BMI) DOCUMENTED: ICD-10-PCS | Mod: CPTII,S$GLB,, | Performed by: INTERNAL MEDICINE

## 2023-12-21 PROCEDURE — 1159F MED LIST DOCD IN RCRD: CPT | Mod: CPTII,S$GLB,, | Performed by: INTERNAL MEDICINE

## 2023-12-21 PROCEDURE — 3044F HG A1C LEVEL LT 7.0%: CPT | Mod: CPTII,S$GLB,, | Performed by: INTERNAL MEDICINE

## 2023-12-21 PROCEDURE — 3074F PR MOST RECENT SYSTOLIC BLOOD PRESSURE < 130 MM HG: ICD-10-PCS | Mod: CPTII,S$GLB,, | Performed by: INTERNAL MEDICINE

## 2023-12-21 PROCEDURE — 1160F RVW MEDS BY RX/DR IN RCRD: CPT | Mod: CPTII,S$GLB,, | Performed by: INTERNAL MEDICINE

## 2023-12-21 PROCEDURE — 3078F PR MOST RECENT DIASTOLIC BLOOD PRESSURE < 80 MM HG: ICD-10-PCS | Mod: CPTII,S$GLB,, | Performed by: INTERNAL MEDICINE

## 2023-12-21 PROCEDURE — 99213 OFFICE O/P EST LOW 20 MIN: CPT | Mod: S$GLB,,, | Performed by: INTERNAL MEDICINE

## 2023-12-21 PROCEDURE — 3008F BODY MASS INDEX DOCD: CPT | Mod: CPTII,S$GLB,, | Performed by: INTERNAL MEDICINE

## 2023-12-21 PROCEDURE — 1159F PR MEDICATION LIST DOCUMENTED IN MEDICAL RECORD: ICD-10-PCS | Mod: CPTII,S$GLB,, | Performed by: INTERNAL MEDICINE

## 2023-12-21 PROCEDURE — 1160F PR REVIEW ALL MEDS BY PRESCRIBER/CLIN PHARMACIST DOCUMENTED: ICD-10-PCS | Mod: CPTII,S$GLB,, | Performed by: INTERNAL MEDICINE

## 2023-12-21 PROCEDURE — 3044F PR MOST RECENT HEMOGLOBIN A1C LEVEL <7.0%: ICD-10-PCS | Mod: CPTII,S$GLB,, | Performed by: INTERNAL MEDICINE

## 2023-12-21 PROCEDURE — 3078F DIAST BP <80 MM HG: CPT | Mod: CPTII,S$GLB,, | Performed by: INTERNAL MEDICINE

## 2023-12-21 PROCEDURE — 3074F SYST BP LT 130 MM HG: CPT | Mod: CPTII,S$GLB,, | Performed by: INTERNAL MEDICINE

## 2023-12-21 RX ORDER — CYCLOBENZAPRINE HCL 10 MG
10 TABLET ORAL 3 TIMES DAILY PRN
Qty: 30 TABLET | Refills: 1 | Status: SHIPPED | OUTPATIENT
Start: 2023-12-21

## 2023-12-21 NOTE — PROGRESS NOTES
"Subjective:       Patient ID: Layla Siegel is a 48 y.o. male.    Chief Complaint: Follow-up (6m FU, back pain 5 x5day, abdominal x3day)    F/u chronic conditions    HPI: 49 y/o now six months post gastric bypass surgery he is taking multivitamins daily has follow up for blood work with his surgeon next week no loose stool diarrhea he has discontinued anti hypertensive medicaiton due to light headedness still with occasional righ tlower back tightness would like to resume physical therapy no bowel or bladder in continecnt no leg parathesia or motor weakness       Review of Systems   Constitutional:  Negative for activity change, appetite change, fatigue, fever and unexpected weight change.   HENT:  Negative for ear pain, rhinorrhea and sore throat.    Eyes:  Negative for discharge and visual disturbance.   Respiratory:  Negative for chest tightness, shortness of breath and wheezing.    Cardiovascular:  Negative for chest pain, palpitations and leg swelling.   Gastrointestinal:  Negative for abdominal pain, constipation and diarrhea.   Endocrine: Negative for cold intolerance and heat intolerance.   Genitourinary:  Negative for dysuria and hematuria.   Musculoskeletal:  Positive for back pain. Negative for joint swelling and neck stiffness.   Skin:  Negative for rash.   Neurological:  Negative for dizziness, syncope, weakness and headaches.   Psychiatric/Behavioral:  Negative for suicidal ideas.        Objective:     Vitals:    12/21/23 1040   BP: 128/68   BP Location: Left arm   Patient Position: Sitting   BP Method: Medium (Manual)   Pulse: 70   Temp: 98 °F (36.7 °C)   TempSrc: Oral   SpO2: 99%   Weight: 96 kg (211 lb 10.3 oz)   Height: 6' 1" (1.854 m)          Physical Exam  Constitutional:       Appearance: He is well-developed.   HENT:      Head: Normocephalic and atraumatic.   Eyes:      General: No scleral icterus.     Conjunctiva/sclera: Conjunctivae normal.   Cardiovascular:      Rate and Rhythm: Normal rate " and regular rhythm.      Heart sounds: No murmur heard.     No friction rub. No gallop.   Pulmonary:      Effort: Pulmonary effort is normal.      Breath sounds: Normal breath sounds. No wheezing or rales.   Abdominal:      General: Bowel sounds are normal.      Palpations: Abdomen is soft.      Tenderness: There is no abdominal tenderness. There is no right CVA tenderness, left CVA tenderness, guarding or rebound.   Musculoskeletal:         General: No tenderness. Normal range of motion.      Cervical back: Normal range of motion.   Skin:     General: Skin is warm and dry.   Neurological:      Mental Status: He is alert and oriented to person, place, and time.      Cranial Nerves: No cranial nerve deficit.      Comments: Normal gait 5/5 dorisflexion bilateral feet         Assessment and Plan   1. S/P gastric bypass  To have nutritional labs with his surgeon later this month    2. Impaired fasting glucose  Improved given weight lso    3. Acute bilateral low back pain with right-sided sciatica  No nsaids in light of gastric bypass prn muscle relaxer  - cyclobenzaprine (FLEXERIL) 10 MG tablet; Take 1 tablet (10 mg total) by mouth 3 (three) times daily as needed for Muscle spasms (muscle tightness).  Dispense: 30 tablet; Refill: 1  - Ambulatory referral/consult to Physical/Occupational Therapy; Future    4. Routine eye exam  Referral for routine vision screening   - Ambulatory referral/consult to Optometry; Future

## 2024-01-01 NOTE — PATIENT INSTRUCTIONS
LC in to see mother. She requested to sleep. Encouraged her to call LC if should would like help - Breastfeeding handouts and supplies left at bedside.    Sleep Lab contact number [phone]    Prema MUNOZ, [facility] [phone]                       swallows  Maternal Response: Relaxed and confident, Attentive,  Comfortable with position           Latch: Grasps breast, tongue down, lips flanged, rhythmic sucking  Audible Swallowing: Spontaneous and intermittent (24 hours old)  Type of Nipple: Everted (after stimulation)  Comfort (Breast/Nipple): Soft/non-tender  Hold (Positioning): No assist from staff, mother able to position/hold infant  LATCH Score: 10

## 2024-01-02 ENCOUNTER — CLINICAL SUPPORT (OUTPATIENT)
Dept: REHABILITATION | Facility: HOSPITAL | Age: 49
End: 2024-01-02
Attending: INTERNAL MEDICINE
Payer: COMMERCIAL

## 2024-01-02 DIAGNOSIS — R29.898 IMPAIRED FLEXIBILITY OF LOWER EXTREMITY: ICD-10-CM

## 2024-01-02 DIAGNOSIS — M54.41 ACUTE BILATERAL LOW BACK PAIN WITH RIGHT-SIDED SCIATICA: ICD-10-CM

## 2024-01-02 DIAGNOSIS — R29.898 DECREASED STRENGTH OF LOWER EXTREMITY: ICD-10-CM

## 2024-01-02 DIAGNOSIS — M53.86 DECREASED ROM OF INTERVERTEBRAL DISCS OF LUMBAR SPINE: Primary | ICD-10-CM

## 2024-01-02 PROCEDURE — 97530 THERAPEUTIC ACTIVITIES: CPT | Mod: PN | Performed by: PHYSICAL THERAPIST

## 2024-01-02 PROCEDURE — 97161 PT EVAL LOW COMPLEX 20 MIN: CPT | Mod: PN | Performed by: PHYSICAL THERAPIST

## 2024-01-02 NOTE — PLAN OF CARE
[facility]   Physical Therapy Initial Evaluation     Date: 1/2/2024   Name: Layla Siegel  Clinic Number: [id number]    Therapy Diagnosis:   Encounter Diagnoses   Name Primary?    Acute bilateral low back pain with right-sided sciatica     Decreased ROM of intervertebral discs of lumbar spine Yes    Impaired flexibility of lower extremity     Decreased strength of lower extremity      Physician: Anival Roes MD    Physician Orders: PT Eval and Treat   Medical Diagnosis from Referral: Acute bilateral low back pain with right-sided sciatica   Evaluation Date: 1/2/2024  Authorization Period Expiration: 12/20/2024  Plan of Care Expiration: 3/15/2024  Visit # / Visits authorized: 1/ 1   (POC 1/20)   FOTO Due visit 5  FOTO Due visit 10    Precautions: Standard  Insurance: Payor: [employer] / Plan: [facility] FEDERAL STANDARD / Product Type: PPO /     Time In: 900  Time Out: 1000  Total Appointment Time (timed & untimed codes): 60 minutes      SUBJECTIVE   Date of onset: January 2023    History of current condition - Layla reports: he is returning to physical therapy after being out for several months. He states [place] was [place] work then had to have abdominal surgery. He stated he want to get back in the [facility] program. He presents with bilateral lumbar pain with symptoms extending into the left lower extremity to the calf. He notes pain with transferring from sit to stand and with prolonged sitting. He stated previously he had numbness into his left foot but that has resolved.    Falls: 0    Imaging, none: available in Epic    Prior Therapy: Was in [facility] program  Social History:  lives with their family  Occupation: Works offshore  Prior Level of Function: Independent  Current Level of Function: Decreased tolerance to sitting, difficulty with transferring sit to stand    Pain:  Current 4/10, worst 9/10, best 1/10   Location: bilateral lumbar and left  lower extremity     Description: Aching and Throbbing  Aggravating Factors: Sitting, Walking, and Getting out of bed/chair  Easing Factors: pain medication    Patients goals: decrease pain     Medical History:   Past Medical History:   Diagnosis Date    High cholesterol     Hypertension        Surgical History:   Layla Siegel  has a past surgical history that includes Colonoscopy (N/A, 12/15/2015) and Cholecystectomy (08/2017).    Medications:   Layla has a current medication list which includes the following prescription(s): cyclobenzaprine, gabapentin, and sildenafil.    Allergies:   Review of patient's allergies indicates:   Allergen Reactions    Pravastatin      Myalgia with Elevated CK          OBJECTIVE     Posture: Decreased lumbar lordosis and forward head in standing  Palpation: Moderate point tenderness noted with palpation of the lumbar paraspinals and left piriformis  Sensation: Intact    Lumbar AROM: ROM-   Response to repeated movements   Flexion 14 degrees - Pain at end range, no worse as a result    Extension 8 degrees - Pain at end range, no worse as a result    Right side bending 6 degrees   Left side bending 8 degrees     L/E MMT Left Right   Hip Flexion 5/5. 5/5.   Hip Extension 4+/5. 4+/5.   Hip Abduction 4/5. 4/5.   Hip Adduction 4-/5. 4-/5.   Hip IR 4+/5. 4+/5.   Hip ER 4+/5. 4+/5.   Knee Flexion 4+/5. 4+/5.   Knee Extension 4+/5. 4+/5.   Abdominals 4/5. 4/5.       Flexibility Left Right   Hamstrings 38 degrees 40 degrees   Achilles 0 degrees 2 degrees       Special Tests Left Right   SLR negative. negative.   KANE negative. negative.   Piriformis positive. positive.   Slump positive. negative.       Gait Without AD   Analysis The patient ambulates with bilateral LE externally rotated in stance phase       Limitation/Restriction for FOTO  Survey    Therapist reviewed FOTO scores for Layla Siegel on 1/2/2024.   FOTO documents entered into EPIC - see Media section.    Intake Score: 54%          TREATMENT     Total Treatment time (time-based codes) separate from Evaluation: 30 minutes     Layla received the treatments listed below:      THERAPEUTIC ACTIVITIES to improve dynamic and functional performance for 30 minutes including :.     Seated Hamstring stretch 3 x 30 sec B  Seated piriformis stretch 3 x 30 sec B  Seated Gastroc-soleus stretch 3 x 30 sec B  Seated sciatic nerve glides (level 1) 2 x 10  Prone press ups 3 x 10  Wall sags 2 x 10      PATIENT EDUCATION AND HOME EXERCISES     Education provided:   - Posture education    Written Home Exercises Provided: yes. Exercises were reviewed and Layla was able to demonstrate them prior to the end of the session.  Layla demonstrated good  understanding of the education provided. See EMR under Patient Instructions for exercises provided during therapy sessions.    ASSESSMENT     Layla is a 48 y.o. male referred to outpatient Physical Therapy with a medical diagnosis of Acute bilateral low back pain with right-sided sciatica . Patient presents with :    Patient prognosis is Good.   Patientt will benefit from skilled outpatient Physical Therapy to address the deficits stated above and in the chart below, provide patient /family education, and to maximize patientt's level of independence.     Plan of care discussed with patient: Yes  Patient's spiritual, cultural and educational needs considered and patient is agreeable to the plan of care and goals as stated below:     Anticipated Barriers for therapy: none    Medical Necessity is demonstrated by the following  History  Co-morbidities and personal factors that may impact the plan of care [x] LOW: no personal factors / co-morbidities  [] MODERATE: 1-2 personal factors / co-morbidities  [] HIGH: 3+ personal factors / co-morbidities    Moderate / High Support Documentation:      Examination  Body Structures and Functions, activity limitations and participation restrictions that may impact the plan of  care [x] LOW: addressing 1-2 elements  [] MODERATE: 3+ elements  [] HIGH: 4+ elements (please support below)    Moderate / High Support Documentation:      Clinical Presentation [x] LOW: stable  [] MODERATE: Evolving  [] HIGH: Unstable     Decision Making/ Complexity Score: low         Goals:  SHORT TERM GOALS:  5 weeks  Progress Date met     Pt will demonstrate increased lumbar ROM by at least 3 degrees from the initial ROM value with improvements noted in functional ROM and ability to perform ADLs  [] Met  [] Not Met  [] Progressing     Pt will demonstrate increased MedX average isometric strength value  by 20% from initial test resulting in improved ability to perform bending, lifting, and carrying activities safely, confidently.  [] Met  [] Not Met  [] Progressing     Patient report a reduction in worst pain score by 1-2 points for improved tolerance for standing and walking.  [] Met  [] Not Met  [] Progressing     Pt able to perform HEP correctly with minimal cueing or supervision from therapist to encourage independent management of symptoms.  [] Met  [] Not Met  [] Progressing        LONG TERM GOALS: 10 weeks  Progress Date met   Pt will demonstrate increased lumbar ROM by at least 6 degrees from initial ROM value, resulting in improved ability to perform functional fwd bending while standing and sitting  [] Met  [] Not Met  [] Progressing     Pt will demonstrate increased MedX average isometric strength value  by 40% from initial test resulting in improved ability to perform bending, lifting, and carrying activities safely, confidently  [] Met  [] Not Met  [] Progressing     Pt to demonstrate ability to independently control and reduce their pain through posture positioning and mechanical movements throughout a typical day  [] Met  [] Not Met  [] Progressing     Pt will demonstrate reduced pain and improved functional outcomes as reported on the FOTO by reaching a limitation score of < or = 40% or less in  order to demonstrate subjective improvement in pt's condition.   [] Met  [] Not Met  [] Progressing     Pt will demonstrate independence with the HEP at discharge  [] Met  [] Not Met  [] Progressing     Patient will report >/= 75% improvement in symptoms since evaluation to improve return [place]  [] Met  [] Not Met  [] Progressing     Patient will demonstrate improvement in SLR ROM prior to onset of tension and symptoms to improve return to PLOF  [] Met  [] Not Met  [] Progressing        PLAN   Plan of care Certification: 1/2/2024 to 3/15/2024.    Outpatient Physical Therapy 2 times weekly for 10 weeks to include the following interventions: Manual Therapy, Neuromuscular Re-ed, Patient Education, Therapeutic Activities, and Therapeutic Exercise. Patient will transfer to the [facility] program.     Lj Michael, PT      I CERTIFY THE NEED FOR THESE SERVICES FURNISHED UNDER THIS PLAN OF TREATMENT AND WHILE UNDER MY CARE   Physician's comments:     Physician's Signature: ___________________________________________________

## 2024-01-29 ENCOUNTER — TELEPHONE (OUTPATIENT)
Dept: FAMILY MEDICINE | Facility: CLINIC | Age: 49
End: 2024-01-29
Payer: COMMERCIAL

## 2024-01-29 NOTE — TELEPHONE ENCOUNTER
Spoke to pt and notified him of his appointment on tomorrow. Also advised pt to fast for his request for labs.

## 2024-01-29 NOTE — TELEPHONE ENCOUNTER
----- Message from Tammy Leon sent at 1/29/2024 11:22 AM CST -----  Regarding: self [phone]  Type:  Sooner Appointment Request    Patient is requesting a sooner appointment.  Patient declined first available appointment listed as well as another facility and provider .  Patient will not accept being placed on the waitlist and is requesting a message be sent to doctor.    Name of Caller:  self     When is the first available appointment? 1/30    Symptoms:  sinus issues pt also stated he needs labs.     Would the patient rather a call back or a response via [facility]? Call back     Best Call Back Number: [phone]

## 2024-01-30 ENCOUNTER — OFFICE VISIT (OUTPATIENT)
Dept: FAMILY MEDICINE | Facility: CLINIC | Age: 49
End: 2024-01-30
Payer: COMMERCIAL

## 2024-01-30 ENCOUNTER — LAB VISIT (OUTPATIENT)
Dept: LAB | Facility: HOSPITAL | Age: 49
End: 2024-01-30
Attending: INTERNAL MEDICINE
Payer: COMMERCIAL

## 2024-01-30 VITALS
HEIGHT: 73 IN | TEMPERATURE: 98 F | DIASTOLIC BLOOD PRESSURE: 82 MMHG | SYSTOLIC BLOOD PRESSURE: 136 MMHG | OXYGEN SATURATION: 99 % | WEIGHT: 208.75 LBS | HEART RATE: 63 BPM | BODY MASS INDEX: 27.67 KG/M2

## 2024-01-30 DIAGNOSIS — Z98.84 S/P GASTRIC BYPASS: ICD-10-CM

## 2024-01-30 DIAGNOSIS — Z98.84 S/P GASTRIC BYPASS: Primary | ICD-10-CM

## 2024-01-30 DIAGNOSIS — M54.50 CHRONIC LEFT-SIDED LOW BACK PAIN WITHOUT SCIATICA: ICD-10-CM

## 2024-01-30 DIAGNOSIS — J30.89 NON-SEASONAL ALLERGIC RHINITIS DUE TO OTHER ALLERGIC TRIGGER: ICD-10-CM

## 2024-01-30 DIAGNOSIS — G89.29 CHRONIC LEFT-SIDED LOW BACK PAIN WITHOUT SCIATICA: ICD-10-CM

## 2024-01-30 LAB
ALBUMIN SERPL BCP-MCNC: 4 G/DL (ref 3.5–5.2)
ALP SERPL-CCNC: 81 U/L (ref 55–135)
ALT SERPL W/O P-5'-P-CCNC: 87 U/L (ref 10–44)
ANION GAP SERPL CALC-SCNC: 5 MMOL/L (ref 8–16)
AST SERPL-CCNC: 37 U/L (ref 10–40)
BASOPHILS # BLD AUTO: 0.02 K/UL (ref 0–0.2)
BASOPHILS NFR BLD: 0.7 % (ref 0–1.9)
BILIRUB SERPL-MCNC: 0.7 MG/DL (ref 0.1–1)
BUN SERPL-MCNC: 16 MG/DL (ref 6–20)
CALCIUM SERPL-MCNC: 8.9 MG/DL (ref 8.7–10.5)
CHLORIDE SERPL-SCNC: 106 MMOL/L (ref 95–110)
CO2 SERPL-SCNC: 30 MMOL/L (ref 23–29)
CREAT SERPL-MCNC: 0.9 MG/DL (ref 0.5–1.4)
DIFFERENTIAL METHOD BLD: ABNORMAL
EOSINOPHIL # BLD AUTO: 0.1 K/UL (ref 0–0.5)
EOSINOPHIL NFR BLD: 3.3 % (ref 0–8)
ERYTHROCYTE [DISTWIDTH] IN BLOOD BY AUTOMATED COUNT: 13.6 % (ref 11.5–14.5)
EST. GFR  (NO RACE VARIABLE): >60 ML/MIN/1.73 M^2
GLUCOSE SERPL-MCNC: 97 MG/DL (ref 70–110)
HCT VFR BLD AUTO: 37.3 % (ref 40–54)
HGB BLD-MCNC: 12.5 G/DL (ref 14–18)
IMM GRANULOCYTES # BLD AUTO: 0 K/UL (ref 0–0.04)
IMM GRANULOCYTES NFR BLD AUTO: 0 % (ref 0–0.5)
LYMPHOCYTES # BLD AUTO: 1.2 K/UL (ref 1–4.8)
LYMPHOCYTES NFR BLD: 45.9 % (ref 18–48)
MCH RBC QN AUTO: 29.8 PG (ref 27–31)
MCHC RBC AUTO-ENTMCNC: 33.5 G/DL (ref 32–36)
MCV RBC AUTO: 89 FL (ref 82–98)
MONOCYTES # BLD AUTO: 0.5 K/UL (ref 0.3–1)
MONOCYTES NFR BLD: 18.5 % (ref 4–15)
NEUTROPHILS # BLD AUTO: 0.9 K/UL (ref 1.8–7.7)
NEUTROPHILS NFR BLD: 31.6 % (ref 38–73)
NRBC BLD-RTO: 0 /100 WBC
PLATELET # BLD AUTO: 174 K/UL (ref 150–450)
PMV BLD AUTO: 9.8 FL (ref 9.2–12.9)
POTASSIUM SERPL-SCNC: 3.9 MMOL/L (ref 3.5–5.1)
PROT SERPL-MCNC: 7.3 G/DL (ref 6–8.4)
RBC # BLD AUTO: 4.2 M/UL (ref 4.6–6.2)
SODIUM SERPL-SCNC: 141 MMOL/L (ref 136–145)
WBC # BLD AUTO: 2.7 K/UL (ref 3.9–12.7)

## 2024-01-30 PROCEDURE — 80053 COMPREHEN METABOLIC PANEL: CPT | Performed by: INTERNAL MEDICINE

## 2024-01-30 PROCEDURE — 3075F SYST BP GE 130 - 139MM HG: CPT | Mod: CPTII,S$GLB,, | Performed by: INTERNAL MEDICINE

## 2024-01-30 PROCEDURE — 85025 COMPLETE CBC W/AUTO DIFF WBC: CPT | Performed by: INTERNAL MEDICINE

## 2024-01-30 PROCEDURE — 1160F RVW MEDS BY RX/DR IN RCRD: CPT | Mod: CPTII,S$GLB,, | Performed by: INTERNAL MEDICINE

## 2024-01-30 PROCEDURE — 36415 COLL VENOUS BLD VENIPUNCTURE: CPT | Mod: PN | Performed by: INTERNAL MEDICINE

## 2024-01-30 PROCEDURE — 1159F MED LIST DOCD IN RCRD: CPT | Mod: CPTII,S$GLB,, | Performed by: INTERNAL MEDICINE

## 2024-01-30 PROCEDURE — 3008F BODY MASS INDEX DOCD: CPT | Mod: CPTII,S$GLB,, | Performed by: INTERNAL MEDICINE

## 2024-01-30 PROCEDURE — 99999 PR PBB SHADOW E&M-EST. PATIENT-LVL III: CPT | Mod: PBBFAC,,, | Performed by: INTERNAL MEDICINE

## 2024-01-30 PROCEDURE — 99213 OFFICE O/P EST LOW 20 MIN: CPT | Mod: S$GLB,,, | Performed by: INTERNAL MEDICINE

## 2024-01-30 PROCEDURE — 3079F DIAST BP 80-89 MM HG: CPT | Mod: CPTII,S$GLB,, | Performed by: INTERNAL MEDICINE

## 2024-01-30 RX ORDER — GABAPENTIN 300 MG/1
300 CAPSULE ORAL NIGHTLY
Qty: 30 CAPSULE | Refills: 2 | Status: SHIPPED | OUTPATIENT
Start: 2024-01-30 | End: 2025-01-29

## 2024-01-30 RX ORDER — FLUTICASONE PROPIONATE 50 MCG
1 SPRAY, SUSPENSION (ML) NASAL 2 TIMES DAILY
Qty: 16 G | Refills: 1 | Status: SHIPPED | OUTPATIENT
Start: 2024-01-30

## 2024-01-30 NOTE — PROGRESS NOTES
"Subjective:       Patient ID: Layla Siegel is a 48 y.o. male.    Chief Complaint: Nasal Congestion (X3 days) and Sinus Problem    Nasal congestion x three days    HPI: 49 y/o h/o gastric sleeve procedure for obesity presents alone for acute/urgent care visit. Three days of nasal congestion frontal forehead "pressure" no vision changes no LE swelling breathing "okay" did feel symptoms were worse yesterday    Still dealing with lower back pain, intermittent did feel home exercise program from PT helps not using gabapentin (would like refill)      Review of Systems   Constitutional:  Negative for activity change, fever and unexpected weight change.   HENT:  Positive for postnasal drip, rhinorrhea and sore throat. Negative for congestion and trouble swallowing.    Eyes:  Negative for photophobia and redness.   Respiratory:  Negative for cough, chest tightness, shortness of breath and wheezing.    Cardiovascular:  Negative for chest pain, palpitations and leg swelling.   Gastrointestinal:  Negative for abdominal pain, blood in stool, constipation, diarrhea, nausea and vomiting.   Endocrine: Negative for cold intolerance, heat intolerance and polyuria.   Genitourinary:  Negative for decreased urine volume, difficulty urinating, dysuria and urgency.   Musculoskeletal:  Positive for back pain. Negative for arthralgias.   Skin:  Negative for rash.   Neurological:  Negative for dizziness, syncope, weakness and headaches.   Psychiatric/Behavioral:  Negative for dysphoric mood, sleep disturbance and suicidal ideas.        Objective:     Vitals:    01/30/24 0944   BP: 136/82   BP Location: Left arm   Patient Position: Sitting   BP Method: Large (Manual)   Pulse: 63   Temp: 97.9 °F (36.6 °C)   TempSrc: Oral   SpO2: 99%   Weight: 94.7 kg (208 lb 12.4 oz)   Height: 6' 1" (1.854 m)          Physical Exam  Constitutional:       Appearance: He is well-developed.   HENT:      Head: Normocephalic and atraumatic.      Right Ear: " Tympanic membrane normal.      Left Ear: Tympanic membrane normal.      Mouth/Throat:      Pharynx: Posterior oropharyngeal erythema present.   Eyes:      General: No scleral icterus.     Conjunctiva/sclera: Conjunctivae normal.   Cardiovascular:      Rate and Rhythm: Normal rate and regular rhythm.      Heart sounds: No murmur heard.     No friction rub. No gallop.   Pulmonary:      Effort: Pulmonary effort is normal.      Breath sounds: Normal breath sounds. No wheezing or rales.   Abdominal:      General: There is no distension.      Palpations: Abdomen is soft.      Tenderness: There is no abdominal tenderness. There is no right CVA tenderness, left CVA tenderness, guarding or rebound.   Musculoskeletal:         General: No tenderness. Normal range of motion.      Cervical back: Normal range of motion and neck supple.      Right lower leg: No edema.      Left lower leg: No edema.   Lymphadenopathy:      Cervical: No cervical adenopathy.   Skin:     General: Skin is warm and dry.   Neurological:      Mental Status: He is alert and oriented to person, place, and time.      Cranial Nerves: No cranial nerve deficit.         Assessment and Plan   1. S/P gastric bypass  Continue multivitamine supplementation  - CBC Auto Differential; Future  - Comprehensive Metabolic Panel; Future    2. Non-seasonal allergic rhinitis due to other allergic trigger  Nasal steroid spray BID  - fluticasone propionate (FLONASE) 50 mcg/actuation nasal spray; 1 spray (50 mcg total) by Each Nostril route 2 (two) times daily.  Dispense: 16 g; Refill: 1    3. Chronic left-sided low back pain without sciatica  Avoiding nsaids prn nightly gabapentin for radicular symptoms topical heating cream prn  - gabapentin (NEURONTIN) 300 MG capsule; Take 1 capsule (300 mg total) by mouth every evening.  Dispense: 30 capsule; Refill: 2

## 2024-02-05 ENCOUNTER — HOSPITAL ENCOUNTER (EMERGENCY)
Facility: HOSPITAL | Age: 49
Discharge: HOME | End: 2024-02-05
Attending: STUDENT IN AN ORGANIZED HEALTH CARE EDUCATION/TRAINING PROGRAM
Payer: COMMERCIAL

## 2024-02-05 VITALS
OXYGEN SATURATION: 99 % | SYSTOLIC BLOOD PRESSURE: 140 MMHG | BODY MASS INDEX: 27.05 KG/M2 | WEIGHT: 205 LBS | TEMPERATURE: 100 F | HEART RATE: 78 BPM | DIASTOLIC BLOOD PRESSURE: 80 MMHG | RESPIRATION RATE: 21 BRPM

## 2024-02-05 DIAGNOSIS — K08.89 PAIN, DENTAL: Primary | ICD-10-CM

## 2024-02-05 DIAGNOSIS — R09.81 NASAL CONGESTION: ICD-10-CM

## 2024-02-05 DIAGNOSIS — B34.9 VIRAL DISEASE: ICD-10-CM

## 2024-02-05 LAB
INFLUENZA A, MOLECULAR: NEGATIVE
INFLUENZA B, MOLECULAR: NEGATIVE
SARS-COV-2 RDRP RESP QL NAA+PROBE: NEGATIVE
SPECIMEN SOURCE: NORMAL

## 2024-02-05 PROCEDURE — 99283 EMERGENCY DEPT VISIT LOW MDM: CPT

## 2024-02-05 PROCEDURE — 87502 INFLUENZA DNA AMP PROBE: CPT | Performed by: STUDENT IN AN ORGANIZED HEALTH CARE EDUCATION/TRAINING PROGRAM

## 2024-02-05 PROCEDURE — U0002 COVID-19 LAB TEST NON-CDC: HCPCS | Performed by: STUDENT IN AN ORGANIZED HEALTH CARE EDUCATION/TRAINING PROGRAM

## 2024-02-05 PROCEDURE — 25000003 PHARM REV CODE 250: Performed by: STUDENT IN AN ORGANIZED HEALTH CARE EDUCATION/TRAINING PROGRAM

## 2024-02-05 RX ORDER — KETOROLAC TROMETHAMINE 10 MG/1
10 TABLET, FILM COATED ORAL
Status: COMPLETED | OUTPATIENT
Start: 2024-02-05 | End: 2024-02-05

## 2024-02-05 RX ORDER — HYDROCODONE BITARTRATE AND ACETAMINOPHEN 10; 325 MG/1; MG/1
1 TABLET ORAL
Status: COMPLETED | OUTPATIENT
Start: 2024-02-05 | End: 2024-02-05

## 2024-02-05 RX ORDER — LIDOCAINE HYDROCHLORIDE 20 MG/ML
5 SOLUTION OROPHARYNGEAL
Status: DISCONTINUED | OUTPATIENT
Start: 2024-02-05 | End: 2024-02-05

## 2024-02-05 RX ORDER — ACETAMINOPHEN 325 MG/1
650 TABLET ORAL
Status: COMPLETED | OUTPATIENT
Start: 2024-02-05 | End: 2024-02-05

## 2024-02-05 RX ADMIN — KETOROLAC TROMETHAMINE 10 MG: 10 TABLET, FILM COATED ORAL at 09:02

## 2024-02-05 RX ADMIN — ACETAMINOPHEN 325MG 650 MG: 325 TABLET ORAL at 09:02

## 2024-02-05 RX ADMIN — HYDROCODONE BITARTRATE AND ACETAMINOPHEN 1 TABLET: 10; 325 TABLET ORAL at 09:02

## 2024-02-05 NOTE — Clinical Note
"Layla Dial" Robbin was seen and treated in our emergency department on 2/5/2024.  He may return to work on 02/08/2024.       If you have any questions or concerns, please don't hesitate to call.      Salma LAUREN RN    "

## 2024-02-06 NOTE — ED PROVIDER NOTES
Encounter Date: 2/5/2024       History     Chief Complaint   Patient presents with    Dental Pain     Pt believes he has a cracked tooth and is having head pain.       48-year-old male presents with dental pain, ongoing the last 24 hours.  Possible cracked tooth.  Also having sinus congestion and on Flonase, no associated trismus    The history is provided by the patient.     Review of patient's allergies indicates:   Allergen Reactions    Pravastatin      Myalgia with Elevated CK     Past Medical History:   Diagnosis Date    High cholesterol     Hypertension      Past Surgical History:   Procedure Laterality Date    CHOLECYSTECTOMY  08/2017    COLONOSCOPY N/A 12/15/2015    Procedure: COLONOSCOPY;  Surgeon: Horace Bella MD;  Location: [facility];  Service: Endoscopy;  Laterality: N/A;     Family History   Problem Relation Age of Onset    Hypertension Mother     Transient ischemic attack Mother     Heart disease Maternal Grandfather     Heart disease Paternal Grandfather     Coronary artery disease Father         s/p cabg    Hypertension Brother      Social History     Tobacco Use    Smoking status: Never    Smokeless tobacco: Never   Substance Use Topics    Alcohol use: Yes     Comment: occasional, once a month 1-2 mixed vodka drink    Drug use: No     Review of Systems   All other systems reviewed and are negative.      Physical Exam     Initial Vitals [02/05/24 1911]   BP Pulse Resp Temp SpO2   (!) 141/80 (!) 111 18 99.9 °F (37.7 °C) 98 %      MAP       --         Physical Exam    Nursing note and vitals reviewed.  Constitutional: Vital signs are normal.  Non-toxic appearance. No distress.   HENT:   Head: Normocephalic.   Dental decay left posterior molar no trismus no airway compromise   Eyes: No scleral icterus.   Cardiovascular:  Regular rhythm.           Pulmonary/Chest: No stridor. No respiratory distress.   Bilateral chest rise   Abdominal: There is no guarding.   Musculoskeletal:         General: No  tenderness.      Cervical back: No rigidity.     Neurological: He is alert.   No associated focal motor or sensory deficit   Skin: Skin is warm and dry. No rash noted.   Psychiatric: His speech is normal. He is not actively hallucinating.   Not anxious  or agitated         ED Course   Procedures  Labs Reviewed   INFLUENZA A & B BY MOLECULAR   SARS-COV-2 RNA AMPLIFICATION, QUAL          Imaging Results    None          Medications   HYDROcodone-acetaminophen  mg per tablet 1 tablet (1 tablet Oral Given 2/5/24 2118)   acetaminophen tablet 650 mg (650 mg Oral Given 2/5/24 2118)   ketorolac tablet 10 mg (10 mg Oral Given 2/5/24 2118)     Medical Decision Making  47 y/o Male presents with dental pain.  Concern for a cracked tooth, has dentistry follow up tomorrow requesting pain control at this time.  No evidence of any infection or abscess, pain control with oral medication Tylenol Toradol and narcotic .  Patient has his wife was coming to pick him up.  Offered topical lidocaine however pharmacy said we have not in stock, offered local injection however patient declined.  Viral testing ordered per patient's request and negative for flu and COVID.    Amount and/or Complexity of Data Reviewed  Labs:  Decision-making details documented in ED Course.    Risk  OTC drugs.  Prescription drug management.               ED Course as of 02/05/24 2213 Mon Feb 05, 2024 2203 SARS-CoV-2 RNA, Amplification, Qual: Negative [KB]   2203 Influenza A, Molecular: Negative [KB]   2204 Influenza B, Molecular: Negative [KB]      ED Course User Index  [KB] Michel Abarca Jr., DO                           Clinical Impression:  Final diagnoses:  [K08.89] Pain, dental (Primary)  [B34.9] Viral disease  [R09.81] Nasal congestion          ED Disposition Condition    Discharge Stable          ED Prescriptions    None       Follow-up Information       Follow up With Specialties Details Why Contact Info Additional Information    [place]  [facility] Emergency Medicine In 1 day As needed, If symptoms worsen [address] [place] [address] [address] [place] [id number] 1st floor             Michel Abarca Jr., DO  02/05/24 0867

## 2024-02-06 NOTE — FIRST PROVIDER EVALUATION
Emergency Department TeleTriage Encounter Note      CHIEF COMPLAINT    Chief Complaint   Patient presents with    Dental Pain     Pt believes he has a cracked tooth and is having head pain.         VITAL SIGNS   Initial Vitals [02/05/24 1911]   BP Pulse Resp Temp SpO2   (!) 141/80 (!) 111 18 99.9 °F (37.7 °C) 98 %      MAP       --            ALLERGIES    Review of patient's allergies indicates:   Allergen Reactions    Pravastatin      Myalgia with Elevated CK       PROVIDER TRIAGE NOTE  Patient presents with complaint of dental pain for few days.  Denies fever.  Has tried nothing for his symptoms.      Phy:   Constitutional: well nourished, well developed, appearing stated age, NAD        Initial orders will be placed and care will be transferred to an alternate provider when patient is roomed for a full evaluation. Any additional orders and the final disposition will be determined by that provider.        ORDERS  Labs Reviewed - No data to display    ED Orders (720h ago, onward)      None              Virtual Visit Note: The provider triage portion of this emergency department evaluation and documentation was performed via [employer], a HIPAA-compliant telemedicine application, in concert with a tele-presenter in the room. A face to face patient evaluation with one of my colleagues will occur once the patient is placed in an emergency department room.      DISCLAIMER: This note was prepared with [employer] voice recognition transcription software. Garbled syntax, mangled pronouns, and other bizarre constructions may be attributed to that software system.

## 2024-04-01 ENCOUNTER — PATIENT OUTREACH (OUTPATIENT)
Dept: ADMINISTRATIVE | Facility: HOSPITAL | Age: 49
End: 2024-04-01
Payer: COMMERCIAL

## 2024-04-28 ENCOUNTER — HOSPITAL ENCOUNTER (OUTPATIENT)
Facility: HOSPITAL | Age: 49
Discharge: HOME | End: 2024-04-30
Attending: EMERGENCY MEDICINE | Admitting: HOSPITALIST
Payer: COMMERCIAL

## 2024-04-28 DIAGNOSIS — I10 HYPERTENSION, UNSPECIFIED TYPE: ICD-10-CM

## 2024-04-28 DIAGNOSIS — G45.9 TIA (TRANSIENT ISCHEMIC ATTACK): Primary | ICD-10-CM

## 2024-04-28 DIAGNOSIS — R29.90 STROKE-LIKE SYMPTOMS: ICD-10-CM

## 2024-04-28 DIAGNOSIS — H81.4 VERTIGO OF CENTRAL ORIGIN: ICD-10-CM

## 2024-04-28 DIAGNOSIS — R07.9 CHEST PAIN: ICD-10-CM

## 2024-04-28 DIAGNOSIS — R42 DIZZINESS: ICD-10-CM

## 2024-04-28 LAB
ALBUMIN SERPL BCP-MCNC: 3.7 G/DL (ref 3.5–5.2)
ALLENS TEST: ABNORMAL
ALP SERPL-CCNC: 102 U/L (ref 55–135)
ALT SERPL W/O P-5'-P-CCNC: 82 U/L (ref 10–44)
ANION GAP SERPL CALC-SCNC: 16 MMOL/L (ref 8–16)
ANION GAP SERPL CALC-SCNC: 9 MMOL/L (ref 8–16)
AST SERPL-CCNC: 46 U/L (ref 10–40)
BASOPHILS # BLD AUTO: 0.02 K/UL (ref 0–0.2)
BASOPHILS NFR BLD: 0.6 % (ref 0–1.9)
BILIRUB SERPL-MCNC: 0.4 MG/DL (ref 0.1–1)
BUN SERPL-MCNC: 22 MG/DL (ref 6–20)
BUN SERPL-MCNC: 23 MG/DL (ref 6–30)
CALCIUM SERPL-MCNC: 9.3 MG/DL (ref 8.7–10.5)
CHLORIDE SERPL-SCNC: 103 MMOL/L (ref 95–110)
CHLORIDE SERPL-SCNC: 107 MMOL/L (ref 95–110)
CHOLEST SERPL-MCNC: 159 MG/DL (ref 120–199)
CHOLEST/HDLC SERPL: 2.3 {RATIO} (ref 2–5)
CO2 SERPL-SCNC: 26 MMOL/L (ref 23–29)
CREAT SERPL-MCNC: 0.9 MG/DL (ref 0.5–1.4)
CREAT SERPL-MCNC: 1 MG/DL (ref 0.5–1.4)
DIFFERENTIAL METHOD BLD: ABNORMAL
EOSINOPHIL # BLD AUTO: 0.1 K/UL (ref 0–0.5)
EOSINOPHIL NFR BLD: 1.6 % (ref 0–8)
ERYTHROCYTE [DISTWIDTH] IN BLOOD BY AUTOMATED COUNT: 13.4 % (ref 11.5–14.5)
EST. GFR  (NO RACE VARIABLE): >60 ML/MIN/1.73 M^2
GLUCOSE SERPL-MCNC: 100 MG/DL (ref 70–110)
GLUCOSE SERPL-MCNC: 100 MG/DL (ref 70–110)
HCT VFR BLD AUTO: 36.3 % (ref 40–54)
HCT VFR BLD CALC: 35 %PCV (ref 36–54)
HDLC SERPL-MCNC: 69 MG/DL (ref 40–75)
HDLC SERPL: 43.4 % (ref 20–50)
HGB BLD-MCNC: 12 G/DL (ref 14–18)
IMM GRANULOCYTES # BLD AUTO: 0 K/UL (ref 0–0.04)
IMM GRANULOCYTES NFR BLD AUTO: 0 % (ref 0–0.5)
INR PPP: 1 (ref 0.8–1.2)
LDLC SERPL CALC-MCNC: 67.8 MG/DL (ref 63–159)
LYMPHOCYTES # BLD AUTO: 1.6 K/UL (ref 1–4.8)
LYMPHOCYTES NFR BLD: 48.9 % (ref 18–48)
MCH RBC QN AUTO: 30.3 PG (ref 27–31)
MCHC RBC AUTO-ENTMCNC: 33.1 G/DL (ref 32–36)
MCV RBC AUTO: 92 FL (ref 82–98)
MONOCYTES # BLD AUTO: 0.4 K/UL (ref 0.3–1)
MONOCYTES NFR BLD: 12 % (ref 4–15)
NEUTROPHILS # BLD AUTO: 1.2 K/UL (ref 1.8–7.7)
NEUTROPHILS NFR BLD: 36.9 % (ref 38–73)
NONHDLC SERPL-MCNC: 90 MG/DL
NRBC BLD-RTO: 0 /100 WBC
PLATELET # BLD AUTO: 165 K/UL (ref 150–450)
PMV BLD AUTO: 9 FL (ref 9.2–12.9)
POC IONIZED CALCIUM: 1.28 MMOL/L (ref 1.06–1.42)
POC TCO2 (MEASURED): 29 MMOL/L (ref 23–29)
POCT GLUCOSE: 142 MG/DL (ref 70–110)
POTASSIUM BLD-SCNC: 4.6 MMOL/L (ref 3.5–5.1)
POTASSIUM SERPL-SCNC: 4.6 MMOL/L (ref 3.5–5.1)
PROT SERPL-MCNC: 6.9 G/DL (ref 6–8.4)
PROTHROMBIN TIME: 11.4 SEC (ref 9–12.5)
RBC # BLD AUTO: 3.96 M/UL (ref 4.6–6.2)
SAMPLE: ABNORMAL
SITE: ABNORMAL
SODIUM BLD-SCNC: 143 MMOL/L (ref 136–145)
SODIUM SERPL-SCNC: 142 MMOL/L (ref 136–145)
TRIGL SERPL-MCNC: 111 MG/DL (ref 30–150)
TSH SERPL DL<=0.005 MIU/L-ACNC: 0.4 UIU/ML (ref 0.4–4)
WBC # BLD AUTO: 3.17 K/UL (ref 3.9–12.7)

## 2024-04-28 PROCEDURE — 84132 ASSAY OF SERUM POTASSIUM: CPT

## 2024-04-28 PROCEDURE — 93010 ELECTROCARDIOGRAM REPORT: CPT | Mod: ,,, | Performed by: INTERNAL MEDICINE

## 2024-04-28 PROCEDURE — 80053 COMPREHEN METABOLIC PANEL: CPT | Performed by: EMERGENCY MEDICINE

## 2024-04-28 PROCEDURE — 84443 ASSAY THYROID STIM HORMONE: CPT | Performed by: EMERGENCY MEDICINE

## 2024-04-28 PROCEDURE — 82330 ASSAY OF CALCIUM: CPT

## 2024-04-28 PROCEDURE — 84295 ASSAY OF SERUM SODIUM: CPT

## 2024-04-28 PROCEDURE — 25000003 PHARM REV CODE 250: Performed by: EMERGENCY MEDICINE

## 2024-04-28 PROCEDURE — 82565 ASSAY OF CREATININE: CPT

## 2024-04-28 PROCEDURE — 85610 PROTHROMBIN TIME: CPT | Performed by: EMERGENCY MEDICINE

## 2024-04-28 PROCEDURE — 80061 LIPID PANEL: CPT | Performed by: EMERGENCY MEDICINE

## 2024-04-28 PROCEDURE — 82962 GLUCOSE BLOOD TEST: CPT

## 2024-04-28 PROCEDURE — 93005 ELECTROCARDIOGRAM TRACING: CPT

## 2024-04-28 PROCEDURE — 99285 EMERGENCY DEPT VISIT HI MDM: CPT | Mod: 25

## 2024-04-28 PROCEDURE — 85014 HEMATOCRIT: CPT

## 2024-04-28 PROCEDURE — G0378 HOSPITAL OBSERVATION PER HR: HCPCS

## 2024-04-28 PROCEDURE — 99900035 HC TECH TIME PER 15 MIN (STAT)

## 2024-04-28 PROCEDURE — 85025 COMPLETE CBC W/AUTO DIFF WBC: CPT | Performed by: EMERGENCY MEDICINE

## 2024-04-28 RX ORDER — MECLIZINE HYDROCHLORIDE 25 MG/1
25 TABLET ORAL 3 TIMES DAILY PRN
Status: DISCONTINUED | OUTPATIENT
Start: 2024-04-28 | End: 2024-04-30 | Stop reason: HOSPADM

## 2024-04-28 RX ORDER — SIMETHICONE 80 MG
1 TABLET,CHEWABLE ORAL 4 TIMES DAILY PRN
Status: DISCONTINUED | OUTPATIENT
Start: 2024-04-28 | End: 2024-04-30 | Stop reason: HOSPADM

## 2024-04-28 RX ORDER — GLUCAGON 1 MG
1 KIT INJECTION
Status: DISCONTINUED | OUTPATIENT
Start: 2024-04-28 | End: 2024-04-30 | Stop reason: HOSPADM

## 2024-04-28 RX ORDER — IBUPROFEN 200 MG
24 TABLET ORAL
Status: DISCONTINUED | OUTPATIENT
Start: 2024-04-28 | End: 2024-04-30 | Stop reason: HOSPADM

## 2024-04-28 RX ORDER — IPRATROPIUM BROMIDE AND ALBUTEROL SULFATE 2.5; .5 MG/3ML; MG/3ML
3 SOLUTION RESPIRATORY (INHALATION) EVERY 4 HOURS PRN
Status: DISCONTINUED | OUTPATIENT
Start: 2024-04-28 | End: 2024-04-30 | Stop reason: HOSPADM

## 2024-04-28 RX ORDER — TALC
6 POWDER (GRAM) TOPICAL NIGHTLY PRN
Status: DISCONTINUED | OUTPATIENT
Start: 2024-04-28 | End: 2024-04-30 | Stop reason: HOSPADM

## 2024-04-28 RX ORDER — ACETAMINOPHEN 325 MG/1
650 TABLET ORAL EVERY 6 HOURS PRN
Status: DISCONTINUED | OUTPATIENT
Start: 2024-04-28 | End: 2024-04-30 | Stop reason: HOSPADM

## 2024-04-28 RX ORDER — POLYETHYLENE GLYCOL 3350 17 G/17G
17 POWDER, FOR SOLUTION ORAL DAILY
Status: DISCONTINUED | OUTPATIENT
Start: 2024-04-29 | End: 2024-04-30 | Stop reason: HOSPADM

## 2024-04-28 RX ORDER — NALOXONE HCL 0.4 MG/ML
0.02 VIAL (ML) INJECTION
Status: DISCONTINUED | OUTPATIENT
Start: 2024-04-28 | End: 2024-04-30 | Stop reason: HOSPADM

## 2024-04-28 RX ORDER — IBUPROFEN 200 MG
16 TABLET ORAL
Status: DISCONTINUED | OUTPATIENT
Start: 2024-04-28 | End: 2024-04-30 | Stop reason: HOSPADM

## 2024-04-28 RX ORDER — ONDANSETRON HYDROCHLORIDE 2 MG/ML
4 INJECTION, SOLUTION INTRAVENOUS EVERY 8 HOURS PRN
Status: DISCONTINUED | OUTPATIENT
Start: 2024-04-28 | End: 2024-04-30 | Stop reason: HOSPADM

## 2024-04-28 RX ORDER — SODIUM CHLORIDE 0.9 % (FLUSH) 0.9 %
10 SYRINGE (ML) INJECTION EVERY 8 HOURS PRN
Status: DISCONTINUED | OUTPATIENT
Start: 2024-04-28 | End: 2024-04-30 | Stop reason: HOSPADM

## 2024-04-28 RX ORDER — PROCHLORPERAZINE EDISYLATE 5 MG/ML
5 INJECTION INTRAMUSCULAR; INTRAVENOUS EVERY 6 HOURS PRN
Status: DISCONTINUED | OUTPATIENT
Start: 2024-04-28 | End: 2024-04-30 | Stop reason: HOSPADM

## 2024-04-28 RX ORDER — ALUMINUM HYDROXIDE, MAGNESIUM HYDROXIDE, AND SIMETHICONE 1200; 120; 1200 MG/30ML; MG/30ML; MG/30ML
30 SUSPENSION ORAL 4 TIMES DAILY PRN
Status: DISCONTINUED | OUTPATIENT
Start: 2024-04-28 | End: 2024-04-30 | Stop reason: HOSPADM

## 2024-04-28 RX ORDER — CLOPIDOGREL BISULFATE 300 MG/1
300 TABLET, FILM COATED ORAL
Status: COMPLETED | OUTPATIENT
Start: 2024-04-28 | End: 2024-04-28

## 2024-04-28 RX ADMIN — CLOPIDOGREL BISULFATE 300 MG: 300 TABLET, FILM COATED ORAL at 08:04

## 2024-04-28 NOTE — LETTER
April 30, 2024         Evonne PLASCENCIA  [facility]  [place] [address]  Phone: [phone]  Fax: [phone]       Patient: Layla Siegel   YOB: 1975  Date of Visit: 04/30/2024    To Whom It May Concern:    Blanca Siegel  was at [facility] on 04/30/2024. The patient may return to work on 5/6/24. If you have any questions or concerns, or if I can be of further assistance, please do not hesitate to contact me.    Sincerely,      Karina Trevino PA-C  Neurosurgery

## 2024-04-28 NOTE — ED PROVIDER NOTES
"SCRIBE #1 NOTE: I, Hali Curiel, am scribing for, and in the presence of,  Odilia Allen MD. I have scribed the following portions of the note - Other sections scribed: HPI, ROS, PE.           EM PHYSICIAN NOTE       This patient presents with a complaint of   Chief Complaint   Patient presents with    Dizziness     Pt to ED via POV with complaints of intermittent dizziness that began this AM at approx 4 AM. States "its like the whole room started spinning." Also reporting L leg tingling that began approx 30 mins PTA. Denies hx of vertigo.       Source of HPI & ROS: patient    HPI: Layla Siegel is a 48 y.o. male, with a PMHx of HTN, who presents to the ED, accompanied by his wife, with intermittent dizziness that began around 4 AM and L calf paraesthesias that began PTA. Patient went to urgent care and was referred to the ER. Patient reports fatigue and feeling like the room is spinning sometimes when he stands. No other exacerbating or alleviating factors. Denies CP, N/V/D, SOB, syncope, diplopia, blurred vision, neck pain, neck stiffness or other associated symptoms. Notes that he was taken off of his antihypertensives following a weight loss, metformin (discontinued.) Reports an allergy to statins,  pravastatin (myalgias with elevated CK.)     He did have weight loss surgery about a year ago      Review of patient's allergies indicates:   Allergen Reactions    Pravastatin      Myalgia with Elevated CK       Preferred pharmacy: [place], [place]        Pertinent REVIEW of SYSTEMS    GENERAL/CONSTITUTIONAL: There is not a report of fever   CARDIOVASCULAR: There is not a report of chest pain   RESPIRATORY: There is not a report of cough or SOB  GASTROINTESTINAL: There is not a report of  vomiting, diarrhea  HEMATOLOGIC/LYMPHATIC: There is not a report of anticoagulant/antithrombotic use.       The nurse's notes and triage vital signs were reviewed.    PHYSICAL " "EXAMINATION    ED Triage Vitals [04/28/24 1453]   Enc Vitals Group      BP (!) 146/80      Pulse 62      Resp 16      Temp 97.9 °F (36.6 °C)      Temp Source Oral      SpO2 98 %      Weight 205 lb      Height 6' 1"      Head Circumference       Peak Flow       Pain Score       Pain Loc       Pain Education       Exclude from Growth Chart      Vital signs and Pulse Ox reviewed in clinical context. Abnormalities noted:  Hypertension  Body mass index is 27.05 kg/m².  Pt's level of consciousness is Awake and Alert, and the patient is in mild distress.  Skin: warm, pink and dry.  Capillary refill is less than 2 seconds.  Mucosa: normal  Head and Neck: no JVD, neck supple  Cardiac exam: RRR I did not appreciate a murmur.  Pulmonary exam: unlabored and clear  Abd Exam: soft nontender   Musculoskeletal: no joint tenderness, deformity or swelling   Neurologic: GCS 15; moving all extremities equally, no facial droop, no dysmetria, no skew, no pronator drift.  No visual field deficits.  No ataxia.      Medical decision making:   Nurses notes and Vital Signs reviewed.     Problems: Today's visit reveals intermittent episodes of dizziness which is a/an Acute problem that is concerning for deterioration due to a differential diagnosis that includes TIA, electrolyte imbalance, dehydration.     Other problems today include elevated blood pressure throughout ER stay     MDM Components integrated into this visit: Social determinants of health impacting care today: Access to PCP impaired because patient was unable to obtain appointment with PCP in a timely manner    Considerations: My decision to place in observation this patient is based on continued episodes of unprovoked dizziness concerning for possible posterior TIA.           See ER course below for lab test ordered, results reviewed, independent interpretation of images or EKG, discussion with consultants, data obtained from sources other than patient:  ED Course as of 04/28/24 " 2120   Sun Apr 28, 2024 1858 TSH is normal.  Lipids are normal. [MH]   1859 CMP is normal. [MH]   1859 CBC is normal [MH]   1859 CT of the head is normal [MH]   1938 Patient reports that he has had 3 more episodes of dizziness while at rest, each time he is returned normal. [MH]   1939 My differential includes TIAs and I have ordered Plavix.  In addition due to the patient's history of weight loss surgery this could be a vitamin deficiency.  I will consult Hospital Medicine for admission for neuro checks with neuro consult in the morning.  Patient does not currently have any focal neuro deficits therefore I have not ordered an emergent MRI this time. [MH]   2030 I have consulted Hospital Medicine team:  discussed case.  Will place the patient in observation for neuro checks []      ED Course User Index  [] Odilia Allen MD          CRITICAL CARE TIME:   Critical care services included the following: chart data review, reviewing nursing notes and researching old charts from internal and external sources, documentation time, consultant collaboration regarding findings and treatment options, medication orders and management, direct patient care, vital sign assessments, physical exam reassessments, and ordering, interpreting and reviewing diagnostic studies/lab tests.    Aggregate critical care time was approximately 35 minutes, which includes only time during which I was engaged in work directly related to the patient's care, as described above, whether at the bedside or elsewhere in the Emergency Department.  It did not include time spent performing other reported procedures or the services of residents, students, nurses or physician assistants.      Transfer of care:  Hospital medicine      Orders Placed This Encounter   Procedures    CT Head Without Contrast    CBC W/ AUTO DIFFERENTIAL    Comprehensive metabolic panel    Protime-INR    TSH    LDL - Lipid Panel    Diet Adult Regular (IDDSI  Level 7) Standard Tray    Cardiac Monitoring - Adult    Vital signs    Neuro checks:  LOC/AVPU, Orientation, GCS if LOC altered, Pupils if LOC altered or GCS <10, Speech/Language, Facial Symmetry, Motor    Complete [place] Screening Assessment No eating, drinking, or oral medications until patient passes the screen. Notify MD of results.    Complete NIH Stroke Scale PRN with any deterioration or worsening of neurologic condition.    Orthostatic blood pressure Orthostatic blood pressure and pulse    Nursing communication    Consult to Telemedicine - Acute Stroke    Pulse Oximetry Continuous    Oxygen Continuous    POCT glucose    ECG 12 lead    Insert peripheral IV     Medications   clopidogreL tablet 300 mg (300 mg Oral Given 4/28/24 2001)           Diagnoses that have been ruled out:   None   Diagnoses that are still under consideration:   None   Final diagnoses:   TIA (transient ischemic attack)   Stroke-like symptoms   Hypertension, unspecified type          Disposition:  Observation for neuro checks, MRI and neuro consult in the morning            Odilia Allen      This note was created using Dictation Software.  This program may occasionally misinterpret certain words and phrases.      SCRIBE ATTESTATION NOTE:   I attest that I personally performed the services documented by the scribe and acknowledged and confirm the content of the note.   Nurses notes were reviewed.  Odilia Stanley MD  04/28/24 2028       Odilia Allen MD  04/28/24 2121

## 2024-04-29 PROBLEM — M50.20 PROTRUDED CERVICAL DISC: Status: ACTIVE | Noted: 2024-04-29

## 2024-04-29 LAB
OHS QRS DURATION: 84 MS
OHS QTC CALCULATION: 422 MS

## 2024-04-29 PROCEDURE — 99215 OFFICE O/P EST HI 40 MIN: CPT | Mod: ,,, | Performed by: INTERNAL MEDICINE

## 2024-04-29 PROCEDURE — G0378 HOSPITAL OBSERVATION PER HR: HCPCS

## 2024-04-29 PROCEDURE — 25500020 PHARM REV CODE 255: Performed by: STUDENT IN AN ORGANIZED HEALTH CARE EDUCATION/TRAINING PROGRAM

## 2024-04-29 RX ADMIN — IOHEXOL 100 ML: 350 INJECTION, SOLUTION INTRAVENOUS at 11:04

## 2024-04-29 NOTE — PROGRESS NOTES
[facility]  Nurses Note -- 4 Eyes      4/29/2024       Skin assessed on: Admit      [x] No Pressure Injuries Present    [x]Prevention Measures Documented    [] Yes LDA  for Pressure Injury Previously documented     [] Yes New Pressure Injury Discovered   [] LDA for New Pressure Injury Added      Attending RN:  Yadira Doran RN     Second RN:  CHARITY Lira

## 2024-04-29 NOTE — SUBJECTIVE & OBJECTIVE
Interval History: dizziness improved, CTA results reviewed Discussed with Neurology - recommending MRI c spine and NSGY consult    Review of Systems  Objective:     Vital Signs (Most Recent):  Temp: 97.7 °F (36.5 °C) (04/29/24 1137)  Pulse: 74 (04/29/24 1456)  Resp: 18 (04/29/24 1137)  BP: (!) 153/82 (04/29/24 1137)  SpO2: 99 % (04/29/24 1137) Vital Signs (24h Range):  Temp:  [97.6 °F (36.4 °C)-98.2 °F (36.8 °C)] 97.7 °F (36.5 °C)  Pulse:  [53-74] 74  Resp:  [16-22] 18  SpO2:  [97 %-99 %] 99 %  BP: (141-177)/(68-91) 153/82     Weight: 91.5 kg (201 lb 12.6 oz)  Body mass index is 26.62 kg/m².    Intake/Output Summary (Last 24 hours) at 4/29/2024 1646  Last data filed at 4/29/2024 0956  Gross per 24 hour   Intake 480 ml   Output 1000 ml   Net -520 ml         Physical Exam  Vitals and nursing note reviewed.   Constitutional:       General: He is not in acute distress.     Appearance: He is well-developed.   HENT:      Head: Normocephalic and atraumatic.      Right Ear: External ear normal.      Left Ear: External ear normal.      Nose: Nose normal.   Eyes:      Conjunctiva/sclera: Conjunctivae normal.   Cardiovascular:      Rate and Rhythm: Normal rate and regular rhythm.   Pulmonary:      Effort: Pulmonary effort is normal. No respiratory distress.   Abdominal:      General: There is no distension.      Palpations: Abdomen is soft.   Musculoskeletal:         General: Normal range of motion.   Skin:     General: Skin is warm and dry.   Neurological:      General: No focal deficit present.      Mental Status: He is alert and oriented to person, place, and time.   Psychiatric:         Thought Content: Thought content normal.             Significant Labs: All pertinent labs within the past 24 hours have been reviewed.    Significant Imaging: I have reviewed all pertinent imaging results/findings within the past 24 hours.

## 2024-04-29 NOTE — PLAN OF CARE
Recommendations    1. Continue cardiac diet, monitoring PO intake of meals and snacks.   2. Monitor weights and labs   3. Collaboration with medical providers    Goals: 1. Pt to meet % EEN/EPN by TIO nguyen  Nutrition Goal Status: new  Communication of RD Recs:  (POC)    Assessment and Plan    Nutrition Problem  No nutrition dx at this time

## 2024-04-29 NOTE — PROGRESS NOTES
HR sustaining to lowest 50's , 51 on monitor   Pt is asymptomatic, NAD noted. Pt resting well on bed.    BONG Salvador notified, response awaiting.

## 2024-04-29 NOTE — HPI
48 y.o. male with hyperlipidemia presents complaint of dizziness.  Acute onset, described as a room spinning sensation, associated with sinus congestion, rhinorrhea, and sensation of left ear fullness.  He noted that changing positions with orthostatic vital signs exacerbated dizziness.  Denies, chills, cough, SOB, chest pain, palpitations, syncope.  In the ED, workup unremarkable for acute abnormality.  Placed in observation for MRI brain and neurology evaluation.

## 2024-04-29 NOTE — ASSESSMENT & PLAN NOTE
ED requests observation for MRI brain and neurology evaluation to rule out acute CVA.  Symptoms much improved.  MRI brain ordered and is negative. CTA with no etiology.  - Neurology suspect peripheral vertigo  - P.r.n. meclizine.

## 2024-04-29 NOTE — ASSESSMENT & PLAN NOTE
- reports intermittent tingling in extremities, no bowel/bladder incontinence  - Neurology recommends MRI c spine and NSGY consult - orders placed

## 2024-04-29 NOTE — ASSESSMENT & PLAN NOTE
ED requests observation for MRI brain and neurology evaluation to rule out acute CVA.  Symptoms much improved.  MRI brain ordered and is pending, will be done tomorrow.  Neurology also consulted, appreciate isidros.  P.r.n. meclizine.

## 2024-04-29 NOTE — CONSULTS
"[facility] - [place] - Observation  Neurology Consult Note    Patient Name: Layla Siegel  Age: 48 y.o.  MRN: [id number]  Admission Date: 4/28/2024  Reason for consult:  Dizziness    CC:  Dizziness    HPI:   Layla Siegel is a 48 y.o. year old male with past medical history of hyperlipidemia presented to the ED yesterday with the acute onset of dizziness.  History obtained from patient and chart review.    Patient states that he was well before going to bed on Saturday night.  He woke up in the morning yesterday at 5:00 a.m. and was sitting up in bed when he had mild episode of dizziness lasting for a couple of minutes and spontaneously resolved.  Later in the day, he had several more episodes of dizziness.  These episodes were occurring at rest, does not endorse worsening with head movement.  Around noon, the dizziness came on suddenly and was more severe than his prior episodes which worried him and he went to the urgent care.  He was sent to the ED from urgent care for evaluation of stroke.  Denies other focal neurological symptoms.  Had associated nausea.  Denies blurry vision, speech abnormality or numbness or tingling in arms or legs.  Of note, had sensation of left ear fullness associated with these dizziness yesterday.  He does state that he had some "Charley horse" in his left ankle and calf the prior day which was unusual.    In the ED, vitals were unremarkable, BP 140s systolic.  He continued to have episodic dizziness however has been symptom-free since waking up this morning.  NIHSS 0.  CT head was obtained which was unremarkable.  MRI brain was unremarkable as well.  Per documentation and patient is information, upon obtaining orthostatic vitals, blood pressure increased to 180s systolic with reproduction of dizziness upon standing up from sitting position.      Histories:  Allergies:  Pravastatin    Current Medications:    Current Facility-Administered Medications   Medication Dose Route Frequency Provider Last " Rate Last Admin    acetaminophen tablet 650 mg  650 mg Oral Q6H PRN Robi Pena Jr., NP        albuterol-ipratropium 2.5 mg-0.5 mg/3 mL nebulizer solution 3 mL  3 mL Nebulization Q4H PRN Robi Pena Jr., NP        aluminum-magnesium hydroxide-simethicone 200-200-20 mg/5 mL suspension 30 mL  30 mL Oral QID PRN Robi Pena Jr., NP        dextrose 10% bolus 125 mL 125 mL  12.5 g Intravenous PRN Robi Pena Jr., NP        dextrose 10% bolus 250 mL 250 mL  25 g Intravenous PRN Robi Pena Jr., NP        glucagon (human recombinant) injection 1 mg  1 mg Intramuscular PRN Robi Pena Jr., NP        glucose chewable tablet 16 g  16 g Oral PRN Robi Pena Jr., NP        glucose chewable tablet 24 g  24 g Oral PRN Robi Pena Jr., NP        meclizine tablet 25 mg  25 mg Oral TID PRN Robi Pena Jr., NP        melatonin tablet 6 mg  6 mg Oral Nightly PRN oRbi Pena Jr., NP        naloxone 0.4 mg/mL injection 0.02 mg  0.02 mg Intravenous PRN Robi Pena Jr., NP        ondansetron injection 4 mg  4 mg Intravenous Q8H PRN Robi Pena Jr., NP        polyethylene glycol packet 17 g  17 g Oral Daily Robi Pena Jr., NP        prochlorperazine injection Soln 5 mg  5 mg Intravenous Q6H PRN Robi Pena Jr., NP        simethicone chewable tablet 80 mg  1 tablet Oral QID PRN Robi Pena Jr., NP        sodium chloride 0.9% flush 10 mL  10 mL Intravenous Q8H PRN Robi Pena Jr., NP           Medical:   Past Medical History:   Diagnosis Date    High cholesterol     Hypertension         Surgeries:   Past Surgical History:   Procedure Laterality Date    CHOLECYSTECTOMY  08/2017    COLONOSCOPY N/A 12/15/2015    Procedure: COLONOSCOPY;  Surgeon: Horace Bella MD;  Location: [facility];  Service: Endoscopy;  Laterality: N/A;        Family:   Family History   Problem Relation Name Age of Onset    Hypertension Mother      Transient ischemic attack Mother      Heart disease Maternal  "Grandfather      Heart disease Paternal Grandfather      Coronary artery disease Father          s/p cabg    Hypertension Brother         Tobacco Use    Smoking status: Never    Smokeless tobacco: Never   Substance and Sexual Activity    Alcohol use: Yes     Comment: occasional, once a month 1-2 mixed vodka drink    Drug use: No    Sexual activity: Yes     Partners: Female     Birth control/protection: None         Physical Exam:     Physical Examination  BP (!) 146/88 (BP Location: Right arm, Patient Position: Sitting)   Pulse (!) 59   Temp 98.2 °F (36.8 °C) (Oral)   Resp 18   Ht 6' 1" (1.854 m)   Wt 91.3 kg (201 lb 4.5 oz)   SpO2 98%   BMI 26.56 kg/m²   Body mass index is 26.56 kg/m².    Neurological Exam  NIHSS ([employer] [place] [facility] Stroke Scale)   1a  Level of consciousness: 0=alert; keenly responsive   1b. LOC questions:  0 = Answers both questions correctly   1c. LOC commands: 0=Answers both tasks correctly   2.  Best Gaze: 0=normal   3. Visual: 0=No visual loss   4. Facial Palsy: 0=Normal symmetric movement   5a. Motor left arm: 0=No drift, limb holds 90 (or 45) degrees for full 10 seconds   5b.  Motor right arm: 0=No drift, limb holds 90 (or 45) degrees for full 10 seconds   6a. motor left le=No drift, limb holds 90 (or 45) degrees for full 10 seconds   6b  Motor right le=No drift, limb holds 90 (or 45) degrees for full 10 seconds   7. Limb Ataxia: 0=Absent   8.  Sensory: 0=Normal; no sensory loss   9. Best Language:  0=No aphasia, normal   10. Dysarthria: 0=Normal   11. Extinction and Inattention: 0=No abnormality         Total:   0         Significant Labs:   Recent Lab Results         24  1629   24  1628   24  1500        Albumin 3.7                      Allens Test   N/A         ALT 82           Anion Gap 9           AST 46           Baso # 0.02           Basophil % 0.6           BILIRUBIN TOTAL 0.4  Comment: For infants and newborns, interpretation of " results should be based  on gestational age, weight and in agreement with clinical  observations.    Premature Infant recommended reference ranges:  Up to 24 hours.............<8.0 mg/dL  Up to 48 hours............<12.0 mg/dL  3-5 days..................<15.0 mg/dL  6-29 days.................<15.0 mg/dL             Site   Other         BUN 22           Calcium 9.3           Chloride 107           Cholesterol Total 159  Comment: The National Cholesterol Education Program (NCEP) has set the  following guidelines (reference ranges) for Cholesterol:  Optimal.....................<200 mg/dL  Borderline High.............200-239 mg/dL  High........................> or = 240 mg/dL             CO2 26           Creatinine 0.9           Differential Method Automated           eGFR >60           Eos # 0.1           Eos % 1.6           Glucose 100           Gran # (ANC) 1.2           Gran % 36.9           HDL 69  Comment: The National Cholesterol Education Program (NCEP) has set the  following guidelines (reference values) for HDL Cholesterol:  Low...............<40 mg/dL  Optimal...........>60 mg/dL             HDL/Cholesterol Ratio 43.4           Hematocrit 36.3           Hemoglobin 12.0           Immature Grans (Abs) 0.00  Comment: Mild elevation in immature granulocytes is non specific and   can be seen in a variety of conditions including stress response,   acute inflammation, trauma and pregnancy. Correlation with other   laboratory and clinical findings is essential.             Immature Granulocytes 0.0           INR 1.0  Comment: Coumadin Therapy:  2.0 - 3.0 for INR for all indicators except mechanical heart valves  and antiphospholipid syndromes which should use 2.5 - 3.5.             LDL Cholesterol 67.8  Comment: The National Cholesterol Education Program (NCEP) has set the  following guidelines (reference values) for LDL Cholesterol:  Optimal.......................<130 mg/dL  Borderline High...............130-159  mg/dL  High..........................160-189 mg/dL  Very High.....................>190 mg/dL             Lymph # 1.6           Lymph % 48.9           MCH 30.3           MCHC 33.1           MCV 92           Mono # 0.4           Mono % 12.0           MPV 9.0           Non-HDL Cholesterol 90  Comment: Risk category and Non-HDL cholesterol goals:  Coronary heart disease (CHD)or equivalent (10-year risk of CHD >20%):  Non-HDL cholesterol goal     <130 mg/dL  Two or more CHD risk factors and 10-year risk of CHD <= 20%:  Non-HDL cholesterol goal     <160 mg/dL  0 to 1 CHD risk factor:  Non-HDL cholesterol goal     <190 mg/dL             nRBC 0           Platelet Count 165           POC Anion Gap   16         POC BUN   23         POC Chloride   103         POC Creatinine   1.0         POC Glucose   100         POC Hematocrit   35         POC Ionized Calcium   1.28         Potassium, Blood Gas   4.6         Sodium, Blood Gas   143         POC TCO2 (MEASURED)   29         POCT Glucose     142       Potassium 4.6           PROTEIN TOTAL 6.9           PT 11.4           RBC 3.96           RDW 13.4           Sample   VENOUS         Sodium 142           Total Cholesterol/HDL Ratio 2.3           Triglycerides 111  Comment: The National Cholesterol Education Program (NCEP) has set the  following guidelines (reference values) for triglycerides:  Normal......................<150 mg/dL  Borderline High.............150-199 mg/dL  High........................200-499 mg/dL             TSH 0.402           WBC 3.17                   Significant Imaging:   Personally reviewed and interpreted the images:   MRI brain without contrast: NAICA    CTA head and neck:   No acute intracranial abnormality. No high-grade stenosis or major vessel occlusion.  Duplicate left cervical vertebral artery noted. C3-4 central disc protrusion with superior extension behind the C3 body producing spinal canal stenosis. Clinical correlation should be made with any  history of cervical spine symptoms.     Assessment and Plan:   48 y.o. year old male with past medical history of hyperlipidemia presented to the ED yesterday with the acute onset of dizziness.  Dizziness is not triggered with head movement, have been episodic with spontaneous resolution, and are associated with nausea.  No other focal neurological deficits noted on exam, no nystagmus.  Given the sensation of ear fullness along with these episodes, severity of vertiginous symptoms along with nausea and lack of associated neurological symptoms, vertigo was likely secondary to peripheral etiology versus secondary to hypertension.     Of note, CTA head and neck concerning for C-spine stenosis.    Plan:   - MRI C-spine without contrast  - consult Neurosurgery  - can hold off on starting on aspirin or Lipitor(LDL 67.8)  - outpatient evaluation with ENT  - if symptoms return, can consider vestibular therapy as outpatient    Thank you for your consult. I will sign off. Please contact us if you have any additional questions.    Time spent on this encounter: 60 minutes. This includes face to face time and non-face to face time preparing to see the patient (eg, review of tests), obtaining and/or reviewing separately obtained history, documenting clinical information in the electronic or other health record, independently interpreting results and communicating results to the patient/family/caregiver, or care coordinator.     Oscar Russ MD  Neurology  [facility]

## 2024-04-29 NOTE — HOSPITAL COURSE
Admitted with dizziness, MRI and CTA negative - suspect peripheral vertigo now resolved. Incidentally found C3-C4 disc protrusion with spinal cord narrowing. Neurology recommending MRI c-spine and NSGY consult.  MRI showing posterior central, cranially extending disc extrusion at C3-C4 contributes to severe spinal canal stenosis with associated cord compression.  Neurosurgery evaluated patient.  Recommend FU that has been scheduled for 5/14. Advised to go to the ER with acute neurologic changes. He has remained afebrile and hemodynamically stable. He will be discharged home to follow up with PCP and Neurosurgery.

## 2024-04-29 NOTE — PROGRESS NOTES
Pt arrive to the unit by stretcher, accompanied by transporter, Assisted pt to bed.  Admit assessment initiated.  Pt is AAO X 4.  NO distress noted.     Pt resting well on bed, wheels locked, side rails up X 3, call light with in reach.    Plan of care discussed with the pt and wife at bedside.

## 2024-04-29 NOTE — PLAN OF CARE
04/29/24 1306   Discharge Planning   Assessment Type Discharge Planning Brief Assessment   Resource/Environmental Concerns none   Support Systems Spouse/significant other   Equipment Currently Used at Home none   Current Living Arrangements home   Patient/Family Anticipates Transition to home;home with family   Patient/Family Anticipated Services at Transition none   DME Needed Upon Discharge  none   Discharge Plan A Home with family       [facility] [address] [place] - [address]  [address] [place] [address]  Phone: [phone] Fax: [phone]

## 2024-04-29 NOTE — PROGRESS NOTES
"  [facility] - Observation  Adult Nutrition  Consult Note    SUMMARY     Recommendations    1. Continue cardiac diet, monitoring PO intake of meals and snacks.   2. Monitor weights and labs   3. Collaboration with medical providers    Goals: 1. Pt to meet % EEN/EPN by TIO nguyen  Nutrition Goal Status: new  Communication of RD Recs:  (POC)    Assessment and Plan    Nutrition Problem  No nutrition dx at this time    Reason for Assessment    Reason For Assessment: identified at risk by screening criteria  Relevant Medical History:   Past Medical History:   Diagnosis Date    High cholesterol     Hypertension      Interdisciplinary Rounds: did not attend  General Information Comments: Pt identified as at risk per malnutrition screening tool. Admitted for dizziness. Pt on cardiac diet with 100% intake of breakfast this morning. BMI 26.56, overweight grade ll. Pt reports good appetite. NFPE 4/29/24 shows no signs of malnutrition at this time. RD to continue to monitor and follow.  Nutrition Discharge Planning: cardiac diet    Nutrition Risk Screen    Nutrition Risk Screen: no indicators present    Nutrition/Diet History    Food Allergies: NKFA    Anthropometrics    Temp: 97.7 °F (36.5 °C)  Height Method: Stated  Height: 6' 1" (185.4 cm)  Height (inches): 73 in  Weight Method: Bed Scale  Weight: 91.5 kg (201 lb 12.6 oz)  Weight (lb): 201.79 lb  Ideal Body Weight (IBW), Male: 184 lb  % Ideal Body Weight, Male (lb): 109.67 %  BMI (Calculated): 26.6  BMI Grade: 25 - 29.9 - overweight       Lab/Procedures/Meds    Pertinent Labs Reviewed: reviewed  BMP  Lab Results   Component Value Date     04/28/2024    K 4.6 04/28/2024     04/28/2024    CO2 26 04/28/2024    BUN 22 (H) 04/28/2024    CREATININE 0.9 04/28/2024    CALCIUM 9.3 04/28/2024    ANIONGAP 9 04/28/2024    EGFRNORACEVR >60 04/28/2024       Pertinent Medications Reviewed: reviewed  Scheduled Meds:  Current Facility-Administered Medications   Medication " Dose Route Frequency    polyethylene glycol  17 g Oral Daily     Continuous Infusions:  Current Facility-Administered Medications   Medication Dose Route Frequency Last Rate Last Admin     PRN Meds:.  Current Facility-Administered Medications:     acetaminophen, 650 mg, Oral, Q6H PRN    albuterol-ipratropium, 3 mL, Nebulization, Q4H PRN    aluminum-magnesium hydroxide-simethicone, 30 mL, Oral, QID PRN    dextrose 10%, 12.5 g, Intravenous, PRN    dextrose 10%, 25 g, Intravenous, PRN    glucagon (human recombinant), 1 mg, Intramuscular, PRN    glucose, 16 g, Oral, PRN    glucose, 24 g, Oral, PRN    meclizine, 25 mg, Oral, TID PRN    melatonin, 6 mg, Oral, Nightly PRN    naloxone, 0.02 mg, Intravenous, PRN    ondansetron, 4 mg, Intravenous, Q8H PRN    prochlorperazine, 5 mg, Intravenous, Q6H PRN    simethicone, 1 tablet, Oral, QID PRN    sodium chloride 0.9%, 10 mL, Intravenous, Q8H PRN      Estimated/Assessed Needs    Weight Used For Calorie Calculations: 91.3 kg (201 lb 4.5 oz)  Energy Calorie Requirements (kcal): 2209 kcal  Energy Need Method: [place]-St Jeor (x 1.2)  Protein Requirements: 91 g  Weight Used For Protein Calculations: 91.3 kg (201 lb 4.5 oz)     Estimated Fluid Requirement Method: RDA Method  RDA Method (mL): 2209         Nutrition Prescription Ordered    Current Diet Order: cardiac diet    Evaluation of Received Nutrient/Fluid Intake    I/O: i/o  Energy Calories Required: meeting needs  Protein Required: meeting needs  Fluid Required: meeting needs  Comments: lbm 4/27/24  % Intake of Estimated Energy Needs: 25 - 50 %  % Meal Intake: 25 - 50 %    Nutrition Risk    Level of Risk/Frequency of Follow-up: low       Monitor and Evaluation    Food and Nutrient Intake: food and beverage intake  Food and Nutrient Adminstration: diet order  Knowledge/Beliefs/Attitudes: food and nutrition knowledge/skill, beliefs and attitudes  Physical Activity and Function: nutrition-related ADLs and IADLs, factors affecting  access to physical activity  Anthropometric Measurements: weight, weight change, body mass index  Biochemical Data, Medical Tests and Procedures: lipid profile  Nutrition-Focused Physical Findings: overall appearance       Nutrition Follow-Up    RD Follow-up?: Yes    Flower Rodrigues, Registration Eligible, Provisional LDN

## 2024-04-29 NOTE — NURSING
[facility] [place]  Nurses Note -- 4 Eyes      4/29/2024       Skin assessed on: Q Shift      [x] No Pressure Injuries Present    []Prevention Measures Documented    [] Yes LDA  for Pressure Injury Previously documented     [] Yes New Pressure Injury Discovered   [] LDA for New Pressure Injury Added      Attending RN:  Salma Cohen RN     Second RN:  Yadira Kuo RN

## 2024-04-29 NOTE — H&P
"  [facility] Medicine  History & Physical    Patient Name: Layla Siegel  MRN: [id number]  Patient Class: OP- Observation  Admission Date: 4/28/2024  Attending Physician: Bruce Brizuela MD   Primary Care Provider: Anival Rose MD         Patient information was obtained from patient, past medical records, and ER records.     Subjective:     Principal Problem:Dizziness    Chief Complaint:   Chief Complaint   Patient presents with    Dizziness     Pt to ED via POV with complaints of intermittent dizziness that began this AM at approx 4 AM. States "its like the whole room started spinning." Also reporting L leg tingling that began approx 30 mins PTA. Denies hx of vertigo.        HPI: 48 y.o. male with hyperlipidemia presents complaint of dizziness.  Acute onset, described as a room spinning sensation, associated with sinus congestion, rhinorrhea, and sensation of left ear fullness.  He noted that changing positions with orthostatic vital signs exacerbated dizziness.  Denies, chills, cough, SOB, chest pain, palpitations, syncope.  In the ED, workup unremarkable for acute abnormality.  Placed in observation for MRI brain and neurology evaluation.    Past Medical History:   Diagnosis Date    High cholesterol     Hypertension        Past Surgical History:   Procedure Laterality Date    CHOLECYSTECTOMY  08/2017    COLONOSCOPY N/A 12/15/2015    Procedure: COLONOSCOPY;  Surgeon: Horace Bella MD;  Location: [facility];  Service: Endoscopy;  Laterality: N/A;       Review of patient's allergies indicates:   Allergen Reactions    Pravastatin      Myalgia with Elevated CK       Current Facility-Administered Medications   Medication Dose Route Frequency Provider Last Rate Last Admin    acetaminophen tablet 650 mg  650 mg Oral Q6H PRN Robi Pena Jr., NP        albuterol-ipratropium 2.5 mg-0.5 mg/3 mL nebulizer solution 3 mL  3 mL Nebulization Q4H PRN Robi Pena Jr., NP        " aluminum-magnesium hydroxide-simethicone 200-200-20 mg/5 mL suspension 30 mL  30 mL Oral QID PRN Robi Pena Jr., NP        dextrose 10% bolus 125 mL 125 mL  12.5 g Intravenous PRN Robi Pena Jr., NP        dextrose 10% bolus 250 mL 250 mL  25 g Intravenous PRN Robi Pena Jr., NP        glucagon (human recombinant) injection 1 mg  1 mg Intramuscular PRN Robi Pena Jr., NP        glucose chewable tablet 16 g  16 g Oral PRN Robi Pena Jr., NP        glucose chewable tablet 24 g  24 g Oral PRN Robi Pena Jr., NP        meclizine tablet 25 mg  25 mg Oral TID PRN Robi Pena Jr., NP        melatonin tablet 6 mg  6 mg Oral Nightly PRN Robi Pena Jr., NP        naloxone 0.4 mg/mL injection 0.02 mg  0.02 mg Intravenous PRN Robi Pena Jr., NP        ondansetron injection 4 mg  4 mg Intravenous Q8H PRN Robi Pena Jr., NP        [START ON 4/29/2024] polyethylene glycol packet 17 g  17 g Oral Daily Robi Pena Jr., NP        prochlorperazine injection Soln 5 mg  5 mg Intravenous Q6H PRN Robi Pena Jr., NP        simethicone chewable tablet 80 mg  1 tablet Oral QID PRN Robi Pena Jr., NP        sodium chloride 0.9% flush 10 mL  10 mL Intravenous Q8H PRN Robi Pena Jr., NP         Current Outpatient Medications   Medication Sig Dispense Refill    cyclobenzaprine (FLEXERIL) 10 MG tablet Take 1 tablet (10 mg total) by mouth 3 (three) times daily as needed for Muscle spasms (muscle tightness). 30 tablet 1    fluticasone propionate (FLONASE) 50 mcg/actuation nasal spray 1 spray (50 mcg total) by Each Nostril route 2 (two) times daily. 16 g 1    gabapentin (NEURONTIN) 300 MG capsule Take 1 capsule (300 mg total) by mouth every evening. 30 capsule 2    sildenafiL (VIAGRA) 100 MG tablet Take 1 tablet (100 mg total) by mouth daily as needed for Erectile Dysfunction. 12 tablet 2     Family History       Problem Relation (Age of Onset)    Coronary artery disease Father     Heart disease Maternal Grandfather, Paternal Grandfather    Hypertension Mother, Brother    Transient ischemic attack Mother          Tobacco Use    Smoking status: Never    Smokeless tobacco: Never   Substance and Sexual Activity    Alcohol use: Yes     Comment: occasional, once a month 1-2 mixed vodka drink    Drug use: No    Sexual activity: Yes     Partners: Female     Birth control/protection: None     Review of Systems   Constitutional:  Negative for chills and fever.   HENT:  Positive for congestion, ear pain and rhinorrhea.    Eyes:  Negative for photophobia and visual disturbance.   Respiratory:  Negative for cough and shortness of breath.    Cardiovascular:  Negative for chest pain, palpitations and leg swelling.   Gastrointestinal:  Negative for abdominal pain, diarrhea, nausea and vomiting.   Genitourinary:  Negative for frequency, hematuria and urgency.   Skin:  Negative for pallor, rash and wound.   Neurological:  Positive for dizziness. Negative for light-headedness and headaches.   Psychiatric/Behavioral:  Negative for confusion and decreased concentration.      Objective:     Vital Signs (Most Recent):  Temp: 97.8 °F (36.6 °C) (04/28/24 2203)  Pulse: 60 (04/28/24 2203)  Resp: 20 (04/28/24 2203)  BP: (!) 153/90 (04/28/24 2203)  SpO2: 98 % (04/28/24 2203) Vital Signs (24h Range):  Temp:  [97.8 °F (36.6 °C)-97.9 °F (36.6 °C)] 97.8 °F (36.6 °C)  Pulse:  [53-66] 60  Resp:  [9-22] 20  SpO2:  [97 %-99 %] 98 %  BP: (143-177)/(68-90) 153/90     Weight: 93 kg (205 lb)  Body mass index is 27.05 kg/m².     Physical Exam  Vitals and nursing note reviewed.   Constitutional:       General: He is not in acute distress.     Appearance: He is well-developed.   HENT:      Head: Normocephalic and atraumatic.      Right Ear: External ear normal.      Left Ear: External ear normal.      Nose: Nose normal.   Eyes:      Conjunctiva/sclera: Conjunctivae normal.   Cardiovascular:      Rate and Rhythm: Normal rate and regular  rhythm.   Pulmonary:      Effort: Pulmonary effort is normal. No respiratory distress.   Abdominal:      General: There is no distension.      Palpations: Abdomen is soft.   Musculoskeletal:         General: Normal range of motion.   Skin:     General: Skin is warm and dry.   Neurological:      Mental Status: He is alert and oriented to person, place, and time.   Psychiatric:         Thought Content: Thought content normal.                Significant Labs: All pertinent labs within the past 24 hours have been reviewed.    Significant Imaging: I have reviewed all pertinent imaging results/findings within the past 24 hours.  Assessment/Plan:     * Dizziness  ED requests observation for MRI brain and neurology evaluation to rule out acute CVA.  Symptoms much improved.  MRI brain ordered and is pending, will be done tomorrow.  Neurology also consulted, appreciate recs.  P.r.n. meclizine.    Hyperlipidemia LDL goal <130  Statin      VTE Risk Mitigation (From admission, onward)           Ordered     IP VTE LOW RISK PATIENT  Once         04/28/24 2218     Place sequential compression device  Until discontinued         04/28/24 2218                         On 04/28/2024, patient should be placed in hospital observation services under my care in collaboration with Bruce Brizuela MD.      AdmissionCare    Guideline: Dizziness - OBS, Observation    Based on the indications selected for the patient, the bed status of Observation was determined to be MET    The following indications were selected as present at the time of evaluation of the patient:      - Serious neurologic etiology suspected (eg, ischemia, hemorrhage, hydrocephalus)    AdmissionCare documentation entered by: Shilpa Cortes    [facility] [employer], 27th edition, Copyright © 2023 [facility], [facility] All Rights Reserved.  [id number]:56:21-05:00    Robi Pena Jr., APRN, AGACNP-BC  Hospitalist - Department of Hospital Medicine  [facility] [address] [place] [id number]  Office #: [phone]; Pager #: [phone]

## 2024-04-29 NOTE — ADMISSIONCARE
AdmissionCare    Guideline: Dizziness - OBS, Observation    Based on the indications selected for the patient, the bed status of Observation was determined to be MET    The following indications were selected as present at the time of evaluation of the patient:      - Serious neurologic etiology suspected (eg, ischemia, hemorrhage, hydrocephalus)    AdmissionCare documentation entered by: Shilpa Cortes    [facility] [place], 27th edition, Copyright © 2023 [facility] [place], [facility] All Rights Reserved.  [id number]:56:21-05:00
Energy Needs: 9626-5025 kcal/day (MSJ x 1.2-1.3 ABW)  Protein Needs: 48-57 gm/day (1-1.2 gm/kcal)  Fluid Needs: 1 mL/kcal

## 2024-04-29 NOTE — SUBJECTIVE & OBJECTIVE
Past Medical History:   Diagnosis Date    High cholesterol     Hypertension        Past Surgical History:   Procedure Laterality Date    CHOLECYSTECTOMY  08/2017    COLONOSCOPY N/A 12/15/2015    Procedure: COLONOSCOPY;  Surgeon: Horace Bella MD;  Location: [facility];  Service: Endoscopy;  Laterality: N/A;       Review of patient's allergies indicates:   Allergen Reactions    Pravastatin      Myalgia with Elevated CK       Current Facility-Administered Medications   Medication Dose Route Frequency Provider Last Rate Last Admin    acetaminophen tablet 650 mg  650 mg Oral Q6H PRN Robi Pena Jr., NP        albuterol-ipratropium 2.5 mg-0.5 mg/3 mL nebulizer solution 3 mL  3 mL Nebulization Q4H PRN Robi Pena Jr., NP        aluminum-magnesium hydroxide-simethicone 200-200-20 mg/5 mL suspension 30 mL  30 mL Oral QID PRN Robi Pena Jr., NP        dextrose 10% bolus 125 mL 125 mL  12.5 g Intravenous PRN Robi Pena Jr., NP        dextrose 10% bolus 250 mL 250 mL  25 g Intravenous PRN Robi Pena Jr., NP        glucagon (human recombinant) injection 1 mg  1 mg Intramuscular PRN Robi Pena Jr., NP        glucose chewable tablet 16 g  16 g Oral PRN Robi Pena Jr., NP        glucose chewable tablet 24 g  24 g Oral PRN Robi Pena Jr., NP        meclizine tablet 25 mg  25 mg Oral TID PRN Robi Pena Jr., NP        melatonin tablet 6 mg  6 mg Oral Nightly PRN Robi Pena Jr., NP        naloxone 0.4 mg/mL injection 0.02 mg  0.02 mg Intravenous PRN Robi Pena Jr., NP        ondansetron injection 4 mg  4 mg Intravenous Q8H PRN Robi Pena Jr., NP        [START ON 4/29/2024] polyethylene glycol packet 17 g  17 g Oral Daily Robi Pena Jr., NP        prochlorperazine injection Soln 5 mg  5 mg Intravenous Q6H PRN Robi Pena Jr., NP        simethicone chewable tablet 80 mg  1 tablet Oral QID PRN Robi Pena Jr., NP        sodium chloride 0.9% flush 10 mL  10 mL  Intravenous Q8H PRN Robi Pena Jr., NP         Current Outpatient Medications   Medication Sig Dispense Refill    cyclobenzaprine (FLEXERIL) 10 MG tablet Take 1 tablet (10 mg total) by mouth 3 (three) times daily as needed for Muscle spasms (muscle tightness). 30 tablet 1    fluticasone propionate (FLONASE) 50 mcg/actuation nasal spray 1 spray (50 mcg total) by Each Nostril route 2 (two) times daily. 16 g 1    gabapentin (NEURONTIN) 300 MG capsule Take 1 capsule (300 mg total) by mouth every evening. 30 capsule 2    sildenafiL (VIAGRA) 100 MG tablet Take 1 tablet (100 mg total) by mouth daily as needed for Erectile Dysfunction. 12 tablet 2     Family History       Problem Relation (Age of Onset)    Coronary artery disease Father    Heart disease Maternal Grandfather, Paternal Grandfather    Hypertension Mother, Brother    Transient ischemic attack Mother          Tobacco Use    Smoking status: Never    Smokeless tobacco: Never   Substance and Sexual Activity    Alcohol use: Yes     Comment: occasional, once a month 1-2 mixed vodka drink    Drug use: No    Sexual activity: Yes     Partners: Female     Birth control/protection: None     Review of Systems   Constitutional:  Negative for chills and fever.   HENT:  Positive for congestion, ear pain and rhinorrhea.    Eyes:  Negative for photophobia and visual disturbance.   Respiratory:  Negative for cough and shortness of breath.    Cardiovascular:  Negative for chest pain, palpitations and leg swelling.   Gastrointestinal:  Negative for abdominal pain, diarrhea, nausea and vomiting.   Genitourinary:  Negative for frequency, hematuria and urgency.   Skin:  Negative for pallor, rash and wound.   Neurological:  Positive for dizziness. Negative for light-headedness and headaches.   Psychiatric/Behavioral:  Negative for confusion and decreased concentration.      Objective:     Vital Signs (Most Recent):  Temp: 97.8 °F (36.6 °C) (04/28/24 2203)  Pulse: 60 (04/28/24  2203)  Resp: 20 (04/28/24 2203)  BP: (!) 153/90 (04/28/24 2203)  SpO2: 98 % (04/28/24 2203) Vital Signs (24h Range):  Temp:  [97.8 °F (36.6 °C)-97.9 °F (36.6 °C)] 97.8 °F (36.6 °C)  Pulse:  [53-66] 60  Resp:  [9-22] 20  SpO2:  [97 %-99 %] 98 %  BP: (143-177)/(68-90) 153/90     Weight: 93 kg (205 lb)  Body mass index is 27.05 kg/m².     Physical Exam  Vitals and nursing note reviewed.   Constitutional:       General: He is not in acute distress.     Appearance: He is well-developed.   HENT:      Head: Normocephalic and atraumatic.      Right Ear: External ear normal.      Left Ear: External ear normal.      Nose: Nose normal.   Eyes:      Conjunctiva/sclera: Conjunctivae normal.   Cardiovascular:      Rate and Rhythm: Normal rate and regular rhythm.   Pulmonary:      Effort: Pulmonary effort is normal. No respiratory distress.   Abdominal:      General: There is no distension.      Palpations: Abdomen is soft.   Musculoskeletal:         General: Normal range of motion.   Skin:     General: Skin is warm and dry.   Neurological:      Mental Status: He is alert and oriented to person, place, and time.   Psychiatric:         Thought Content: Thought content normal.                Significant Labs: All pertinent labs within the past 24 hours have been reviewed.    Significant Imaging: I have reviewed all pertinent imaging results/findings within the past 24 hours.

## 2024-04-29 NOTE — NURSING
Received report from CHARITY May. Patient lying in bed resting, NAD noted. Safety Precautions maintained, Will Monitor.

## 2024-04-29 NOTE — PROGRESS NOTES
[facility] Medicine  Progress Note    Patient Name: Layla Siegel  MRN: [id number]  Patient Class: OP- Observation   Admission Date: 4/28/2024  Length of Stay: 0 days  Attending Physician: Pérez Reeves MD  Primary Care Provider: Anival Rose MD        Subjective:     Principal Problem:Dizziness        HPI:  48 y.o. male with hyperlipidemia presents complaint of dizziness.  Acute onset, described as a room spinning sensation, associated with sinus congestion, rhinorrhea, and sensation of left ear fullness.  He noted that changing positions with orthostatic vital signs exacerbated dizziness.  Denies, chills, cough, SOB, chest pain, palpitations, syncope.  In the ED, workup unremarkable for acute abnormality.  Placed in observation for MRI brain and neurology evaluation.    Overview/Hospital Course:  Admitted with dizziness, MRI and CTA negative - suspect peripheral vertigo now resolved. Incidentally found C3-C4 disc protrusion with spinal cord narrowing. Neurology recommending MRI c-spine and NSGY consult.    Interval History: dizziness improved, CTA results reviewed Discussed with Neurology - recommending MRI c spine and NSGY consult    Review of Systems  Objective:     Vital Signs (Most Recent):  Temp: 97.7 °F (36.5 °C) (04/29/24 1137)  Pulse: 74 (04/29/24 1456)  Resp: 18 (04/29/24 1137)  BP: (!) 153/82 (04/29/24 1137)  SpO2: 99 % (04/29/24 1137) Vital Signs (24h Range):  Temp:  [97.6 °F (36.4 °C)-98.2 °F (36.8 °C)] 97.7 °F (36.5 °C)  Pulse:  [53-74] 74  Resp:  [16-22] 18  SpO2:  [97 %-99 %] 99 %  BP: (141-177)/(68-91) 153/82     Weight: 91.5 kg (201 lb 12.6 oz)  Body mass index is 26.62 kg/m².    Intake/Output Summary (Last 24 hours) at 4/29/2024 1646  Last data filed at 4/29/2024 0956  Gross per 24 hour   Intake 480 ml   Output 1000 ml   Net -520 ml         Physical Exam  Vitals and nursing note reviewed.   Constitutional:       General: He is not in acute distress.     Appearance: He  is well-developed.   HENT:      Head: Normocephalic and atraumatic.      Right Ear: External ear normal.      Left Ear: External ear normal.      Nose: Nose normal.   Eyes:      Conjunctiva/sclera: Conjunctivae normal.   Cardiovascular:      Rate and Rhythm: Normal rate and regular rhythm.   Pulmonary:      Effort: Pulmonary effort is normal. No respiratory distress.   Abdominal:      General: There is no distension.      Palpations: Abdomen is soft.   Musculoskeletal:         General: Normal range of motion.   Skin:     General: Skin is warm and dry.   Neurological:      General: No focal deficit present.      Mental Status: He is alert and oriented to person, place, and time.   Psychiatric:         Thought Content: Thought content normal.             Significant Labs: All pertinent labs within the past 24 hours have been reviewed.    Significant Imaging: I have reviewed all pertinent imaging results/findings within the past 24 hours.    Assessment/Plan:      * Dizziness  ED requests observation for MRI brain and neurology evaluation to rule out acute CVA.  Symptoms much improved.  MRI brain ordered and is negative. CTA with no etiology.  - Neurology suspect peripheral vertigo  - P.r.n. meclizine.    Protruded cervical disc  - reports intermittent tingling in extremities, no bowel/bladder incontinence  - Neurology recommends MRI c spine and NSGY consult - orders placed      Hyperlipidemia LDL goal <130  Statin      VTE Risk Mitigation (From admission, onward)           Ordered     IP VTE LOW RISK PATIENT  Once         04/28/24 2218     Place sequential compression device  Until discontinued         04/28/24 2218                    Discharge Planning   MICHAEL:      Code Status: Full Code   Is the patient medically ready for discharge?:     Reason for patient still in hospital (select all that apply): Treatment  Discharge Plan A: Home with family                  Pérez Reeves MD  Department of Hospital Medicine   [place]  [facility] - Observation

## 2024-04-30 VITALS
SYSTOLIC BLOOD PRESSURE: 132 MMHG | DIASTOLIC BLOOD PRESSURE: 70 MMHG | WEIGHT: 200.19 LBS | HEART RATE: 70 BPM | HEIGHT: 73 IN | OXYGEN SATURATION: 100 % | RESPIRATION RATE: 19 BRPM | BODY MASS INDEX: 26.53 KG/M2 | TEMPERATURE: 98 F

## 2024-04-30 PROBLEM — R42 DIZZINESS: Status: RESOLVED | Noted: 2024-04-28 | Resolved: 2024-04-30

## 2024-04-30 PROCEDURE — 99203 OFFICE O/P NEW LOW 30 MIN: CPT | Mod: ,,, | Performed by: PHYSICIAN ASSISTANT

## 2024-04-30 PROCEDURE — A9585 GADOBUTROL INJECTION: HCPCS | Performed by: HOSPITALIST

## 2024-04-30 PROCEDURE — 25500020 PHARM REV CODE 255: Performed by: HOSPITALIST

## 2024-04-30 PROCEDURE — G0378 HOSPITAL OBSERVATION PER HR: HCPCS

## 2024-04-30 RX ORDER — GADOBUTROL 604.72 MG/ML
9.1 INJECTION INTRAVENOUS
Status: COMPLETED | OUTPATIENT
Start: 2024-04-30 | End: 2024-04-30

## 2024-04-30 RX ADMIN — GADOBUTROL 9.1 ML: 604.72 INJECTION INTRAVENOUS at 02:04

## 2024-04-30 NOTE — HPI
Layla Siegel is a 48-year-old male who presented to [facility] ED on 04/28/2024 with complaints of dizziness and vertigo x1 day.  Symptoms were worst on Sunday with about 8 episodes of dizziness and seemed to improve on Monday.  No dizziness today.  Patient reports the room spinning on him in a wave-like motion.  He has never had this before.  MRI brain, CT head, and CTA head and neck were obtained to r/o stroke and a C3-4 disc herniation was incidentally noted on CTA.  Neurosurgery was consulted, and MRI cervical is pending.  Patient denies h/o fine motor dysfunction, upper extremity numbness or weakness, gait disturbance, b/b dysfunction, and SA.  His legs gave out on him once while jumping on a trampoline last year.  Otherwise denies any significant weakness.He does note intermittent numbness in the right ulnar distribution after his elbow rests on a hard surface for prolonged period of time.  He also notes intermittent numbness and pain in his right posterolateral thigh over the last 2 years.  More recent onset of left anterior shin and foot Charley horse sensations.  Lastly, he notes more recent onset of posterior neck pain.  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  Neither involves heavy lifting, but there is frequent neck flexion during these occupations.  Patient lost approximately 95 lb after weight loss surgery.  Walks regularly.

## 2024-04-30 NOTE — PLAN OF CARE
Problem: Adult Inpatient Plan of Care  Goal: Plan of Care Review  Flowsheets (Taken 4/30/2024 0238)  Plan of Care Reviewed With: patient  Goal: Absence of Hospital-Acquired Illness or Injury  Intervention: Identify and Manage Fall Risk  Flowsheets (Taken 4/30/2024 0238)  Safety Promotion/Fall Prevention:   Fall Risk reviewed with patient/family   lighting adjusted   medications reviewed   side rails raised x 2  Intervention: Prevent Skin Injury  Flowsheets (Taken 4/30/2024 0238)  Body Position: position changed independently  Intervention: Prevent and Manage VTE (Venous Thromboembolism) Risk  Flowsheets (Taken 4/30/2024 0238)  VTE Prevention/Management: ROM (active) performed  Goal: Optimal Comfort and Wellbeing  Intervention: Monitor Pain and Promote Comfort  Flowsheets (Taken 4/30/2024 0238)  Pain Management Interventions: pain management plan reviewed with patient/caregiver  Intervention: Provide Person-Centered Care  Flowsheets (Taken 4/30/2024 0238)  Trust Relationship/Rapport:   care explained   questions answered   questions encouraged   thoughts/feelings acknowledged     Problem: Infection  Goal: Absence of Infection Signs and Symptoms  Intervention: Prevent or Manage Infection  Flowsheets (Taken 4/30/2024 0238)  Infection Management:   aseptic technique maintained   cultures obtained and sent to lab     Problem: Fatigue  Goal: Improved Activity Tolerance  Intervention: Promote Improved Energy  Flowsheets (Taken 4/30/2024 0238)  Sleep/Rest Enhancement:   awakenings minimized   consistent schedule promoted   regular sleep/rest pattern promoted   relaxation techniques promoted  Environmental Support:   calm environment promoted   personal routine supported

## 2024-04-30 NOTE — NURSING
Per handoff received from Salma GLASER RN. Patient care assumed. Patients overall condition assessed and patient appears to be in NAD with no complaints of pain. 20g LAC PIV is clean, dry, and intact. Call light in reach and patient instructed to inform the nurse if anything is needed. Patient stable and is continually being monitored.

## 2024-04-30 NOTE — CONSULTS
[facility] - [place] - Observation  Neurosurgery  Consult Note    Inpatient consult to Neurosurgery  Consult performed by: Karina Trevino PA-C  Consult ordered by: Pérez Reeves MD  Reason for consult: cervical disc herniation        Subjective:     Chief Complaint/Reason for Admission: dizziness    History of Present Illness: Layla Siegel is a 48-year-old male who presented to [facility] ED on 04/28/2024 with complaints of dizziness and vertigo x1 day.  Symptoms were worst on Sunday with about 8 episodes of dizziness and seemed to improve on Monday.  No dizziness today.  Patient reports the room spinning on him in a wave-like motion.  He has never had this before.  MRI brain, CT head, and CTA head and neck were obtained to r/o stroke and a C3-4 disc herniation was incidentally noted on CTA.  Neurosurgery was consulted, and MRI cervical is pending.  Patient denies h/o fine motor dysfunction, upper extremity numbness or weakness, gait disturbance, b/b dysfunction, and SA.  His legs gave out on him once while jumping on a trampoline last year.  Otherwise denies any significant weakness.He does note intermittent numbness in the right ulnar distribution after his elbow rests on a hard surface for prolonged period of time.  He also notes intermittent numbness and pain in his right posterolateral thigh over the last 2 years.  More recent onset of left anterior shin and foot Charley horse sensations.  Lastly, he notes more recent onset of posterior neck pain.  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  Neither involves heavy lifting, but there is frequent neck flexion during these occupations.  Patient lost approximately 95 lb after weight loss surgery.  Walks regularly.      Medications Prior to Admission   Medication Sig Dispense Refill Last Dose    fluticasone propionate (FLONASE) 50 mcg/actuation nasal spray 1 spray (50 mcg total) by Each Nostril route 2 (two) times  daily. 16 g 1 Past Week    cyclobenzaprine (FLEXERIL) 10 MG tablet Take 1 tablet (10 mg total) by mouth 3 (three) times daily as needed for Muscle spasms (muscle tightness). 30 tablet 1 More than a month    gabapentin (NEURONTIN) 300 MG capsule Take 1 capsule (300 mg total) by mouth every evening. 30 capsule 2 More than a month    sildenafiL (VIAGRA) 100 MG tablet Take 1 tablet (100 mg total) by mouth daily as needed for Erectile Dysfunction. 12 tablet 2        Review of patient's allergies indicates:   Allergen Reactions    Pravastatin      Myalgia with Elevated CK       Past Medical History:   Diagnosis Date    High cholesterol     Hypertension      Past Surgical History:   Procedure Laterality Date    CHOLECYSTECTOMY  08/2017    COLONOSCOPY N/A 12/15/2015    Procedure: COLONOSCOPY;  Surgeon: Horace Bella MD;  Location: [facility];  Service: Endoscopy;  Laterality: N/A;     Family History       Problem Relation (Age of Onset)    Coronary artery disease Father    Heart disease Maternal Grandfather, Paternal Grandfather    Hypertension Mother, Brother    Transient ischemic attack Mother          Tobacco Use    Smoking status: Never    Smokeless tobacco: Never   Substance and Sexual Activity    Alcohol use: Yes     Comment: occasional, once a month 1-2 mixed vodka drink    Drug use: No    Sexual activity: Yes     Partners: Female     Birth control/protection: None     Review of Systems   Gastrointestinal:  Negative for constipation.   Genitourinary:  Negative for difficulty urinating.   Musculoskeletal:  Positive for back pain and neck pain. Negative for gait problem.   Skin:  Negative for wound.   Neurological:  Positive for dizziness. Negative for weakness and numbness.   Psychiatric/Behavioral:  Negative for confusion.      Objective:     Weight: 90.8 kg (200 lb 2.8 oz)  Body mass index is 26.41 kg/m².  Vital Signs (Most Recent):  Temp: 97.6 °F (36.4 °C) (04/30/24 0739)  Pulse: (!) 56 (04/30/24 0739)  Resp: 19  (04/30/24 0739)  BP: (!) 141/76 (04/30/24 0739)  SpO2: 99 % (04/30/24 0739) Vital Signs (24h Range):  Temp:  [97.5 °F (36.4 °C)-98.4 °F (36.9 °C)] 97.6 °F (36.4 °C)  Pulse:  [56-82] 56  Resp:  [16-19] 19  SpO2:  [98 %-99 %] 99 %  BP: (122-153)/(60-86) 141/76          Physical Exam     Neurosurgery Physical Exam  General: well developed, well nourished, no distress.   Head: normocephalic, atraumatic  Neurologic: Awake, Alert, Oriented x 4. Thought content appropriate.  GCS: Motor: 6/Verbal: 5/Eyes: 4 GCS Total: 15  Language: No aphasia  Speech: No dysarthria  Cranial nerves: face symmetric, tongue midline, CN II-XII grossly intact.   Eyes: pupils equal, round, reactive to light with accomodation, EOMI.  Pulmonary: normal respirations, no signs of respiratory distress  Abdomen: soft, non-distended, not tender to palpation    Sensory: intact to light touch throughout b/l upper and lower extremities    Motor Strength: Moves all extremities spontaneously with good tone.  Full strength upper and lower extremities. No abnormal movements seen.     Strength  Deltoids Triceps Biceps Wrist Extension Wrist Flexion Hand  FA   Upper: R 5/5 5/5 5/5 5/5 5/5 5/5 5/5    L 5/5 5/5 5/5 5/5 5/5 5/5 5/5     Iliopsoas   Tibialis  anterior Gastro- cnemius EHL    Lower: R 5/5   5/5 5/5 5/5     L 5/5   5/5 5/5 5/5      DTR's: 2 + and symmetric in patellar     Martinez: absent  Clonus: absent    Skin: Skin is warm, dry and intact.    Significant Labs:  Recent Labs   Lab 04/28/24  1629         K 4.6      CO2 26   BUN 22*   CREATININE 0.9   CALCIUM 9.3     Recent Labs   Lab 04/28/24  1628 04/28/24  1629   WBC  --  3.17*   HGB  --  12.0*   HCT 35* 36.3*   PLT  --  165     Recent Labs   Lab 04/28/24  1629   INR 1.0     Microbiology Results (last 7 days)       ** No results found for the last 168 hours. **              Significant Diagnostics:  I have personally reviewed imaging and agree with the findings.     CTA head and  neck 04/29/2024   No acute intracranial abnormality.  No high-grade stenosis or major vessel occlusion.     Duplicated left cervical vertebral artery, as above.  This is a normal variant.     C3-4 central disc protrusion with superior extension behind the C3 body producing spinal canal stenosis.  Clinical correlation should be made with any history of cervical spine symptoms.       MRI brain without contrast 04/29/2024   No acute abnormality.     CT head without contrast 4/28/24  No acute intracranial process.  Additional evaluation with MRI of the brain, as clinically warranted.      Assessment/Plan:     Protruded cervical disc  Layla Siegel is a 47 y/o male who presented for evaluation of dizziness/vertigo. During workup for possible stroke, a disc herniation at C3-4 was incidentally noted. MRI cervical is pending. Patient is intact on exam and denies red flag sxs.     -Agree with need for cervical MRI to assess degree of stenosis. Obtain this admission if possible. However, if discharge will be delayed by MRI, I believe he is stable enough to schedule next week in the outpatient setting with clinic FU for review.   -Until imaging is obtained, patient will need to protect himself from falls and avoid excessive overhead lifting and neck strain.     Case discussed with Dr. Abbott        Thank you for your consult.     Karina Trevino PA-C  Neurosurgery  [facility] - [place] - Observation

## 2024-04-30 NOTE — NURSING
Reported off to oncoming nurse, patient resting in bed, [id number]. Patient can make needs known to staff, minimal assist required for adls and transfers, no acute distress noted, safety precautions maintained.      Chart check completed.   O-T Plasty Text: The defect edges were debeveled with a #15 scalpel blade.  Given the location of the defect, shape of the defect and the proximity to free margins an O-T plasty was deemed most appropriate.  Using a sterile surgical marker, an appropriate O-T plasty was drawn incorporating the defect and placing the expected incisions within the relaxed skin tension lines where possible.    The area thus outlined was incised deep to adipose tissue with a #15 scalpel blade.  The skin margins were undermined to an appropriate distance in all directions utilizing iris scissors.

## 2024-04-30 NOTE — DISCHARGE SUMMARY
[facility] Medicine  Discharge Summary      Patient Name: Layla Siegel  MRN: [id number]  [facility]: [id number]  Patient Class: OP- Observation  Admission Date: 4/28/2024  Hospital Length of Stay: 0 days  Discharge Date and Time:  04/30/2024 3:55 PM  Attending Physician: Bruce Brizuela MD   Discharging Provider: Bruce Brizuela MD  Primary Care Provider: Anival Rose MD    Primary Care Team: Networked reference to record PCT     HPI:   48 y.o. male with hyperlipidemia presents complaint of dizziness.  Acute onset, described as a room spinning sensation, associated with sinus congestion, rhinorrhea, and sensation of left ear fullness.  He noted that changing positions with orthostatic vital signs exacerbated dizziness.  Denies, chills, cough, SOB, chest pain, palpitations, syncope.  In the ED, workup unremarkable for acute abnormality.  Placed in observation for MRI brain and neurology evaluation.    * No surgery found *      Hospital Course:   Admitted with dizziness, MRI and CTA negative - suspect peripheral vertigo now resolved. Incidentally found C3-C4 disc protrusion with spinal cord narrowing. Neurology recommending MRI c-spine and NSGY consult.  MRI showing posterior central, cranially extending disc extrusion at C3-C4 contributes to severe spinal canal stenosis with associated cord compression.  Neurosurgery evaluated patient.  Recommend FU that has been scheduled for 5/14. Advised to go to the ER with acute neurologic changes. He has remained afebrile and hemodynamically stable. He will be discharged home to follow up with PCP and Neurosurgery.       Goals of Care Treatment Preferences:  Code Status: Full Code      Consults:   Consults (From admission, onward)          Status Ordering Provider     Inpatient consult to Neurosurgery  Once        Provider:  Karina Trevino PA-C    Completed DANIELA AMAYA     Inpatient consult to Neurology  Once        Provider:  Petrona  MD Oscar    Completed SOURAV MAYA JR     Consult to Telemedicine - Acute Stroke  Once        Provider:  (Not yet assigned)    Acknowledged CHRIS BRAUN            No new Assessment & Plan notes have been filed under this hospital service since the last note was generated.  Service: Hospital Medicine    Final Active Diagnoses:    Diagnosis Date Noted POA    Protruded cervical disc [M50.20] 04/29/2024 Yes    Hyperlipidemia LDL goal <130 [E78.5] 12/03/2014 Yes     Chronic      Problems Resolved During this Admission:    Diagnosis Date Noted Date Resolved POA    PRINCIPAL PROBLEM:  Dizziness [R42] 04/28/2024 04/30/2024 Yes       Discharged Condition: stable    Disposition: Home or Self Care    Follow Up:   Follow-up Information       Karina Trevino PA-C Follow up on 5/14/2024.    Specialty: Neurosurgery  Why: Neurosurgery follow-up at 2:40am  Contact information:  [address] [facility] [address]  [address]  [place] [id number]  [phone]               Anival Rose MD Follow up in 1 week(s).    Specialties: Internal Medicine, Wound Care  Contact information:  [address] [id number]  [phone]                           Patient Instructions:      Diet Adult Regular     Notify your health care provider if you experience any of the following:  temperature >100.4     Notify your health care provider if you experience any of the following:  persistent nausea and vomiting or diarrhea     Notify your health care provider if you experience any of the following:  severe uncontrolled pain     Notify your health care provider if you experience any of the following:  difficulty breathing or increased cough     Notify your health care provider if you experience any of the following:  persistent dizziness, light-headedness, or visual disturbances     Notify your health care provider if you experience any of the following:  increased confusion or weakness     Activity as tolerated       Significant Diagnostic  Studies: N/A    Pending Diagnostic Studies:       None           Medications:  Reconciled Home Medications:      Medication List        CONTINUE taking these medications      cyclobenzaprine 10 MG tablet  Commonly known as: FLEXERIL  Take 1 tablet (10 mg total) by mouth 3 (three) times daily as needed for Muscle spasms (muscle tightness).     fluticasone propionate 50 mcg/actuation nasal spray  Commonly known as: FLONASE  1 spray (50 mcg total) by Each Nostril route 2 (two) times daily.     gabapentin 300 MG capsule  Commonly known as: NEURONTIN  Take 1 capsule (300 mg total) by mouth every evening.     sildenafiL 100 MG tablet  Commonly known as: VIAGRA  Take 1 tablet (100 mg total) by mouth daily as needed for Erectile Dysfunction.              Indwelling Lines/Drains at time of discharge:   Lines/Drains/Airways       None                   Time spent on the discharge of patient: >31 minutes         Bruce Brizuela MD  Department of Hospital Medicine  [facility] - [place] - Observation

## 2024-04-30 NOTE — NURSING
[facility] [place]  Nurses Note -- 4 Eyes      4/29/2024       Skin assessed on: Q Shift      [x] No Pressure Injuries Present    [x]Prevention Measures Documented    [] Yes LDA  for Pressure Injury Previously documented     [] Yes New Pressure Injury Discovered   [] LDA for New Pressure Injury Added      Attending RN:  Emma Charles, RN     Second RN:  Salma GLASER RN

## 2024-04-30 NOTE — PROGRESS NOTES
Reviewed MRI results with patient. Recommend FU in two weeks. He should avoid heavy lifting and excessive overhead lifting. He should protect himself from falls. Advised to go to the ER with acute neurologic changes. Otherwise, he may follow-up with me as scheduled on 5/14/24.     Work letter provided via [facility] with [employer] on 5/6/24.       Karina Trevino PA-C  [facility]  Department of Neurosurgery  [phone]

## 2024-04-30 NOTE — ASSESSMENT & PLAN NOTE
Layla Siegel is a 49 y/o male who presented for evaluation of dizziness/vertigo. During workup for possible stroke, a disc herniation at C3-4 was incidentally noted. MRI cervical is pending. Patient is intact on exam and denies red flag sxs.     -Agree with need for cervical MRI to assess degree of stenosis. Obtain this admission if possible. However, if discharge will be delayed by MRI, I believe he is stable enough to schedule next week in the outpatient setting with clinic FU for review.   -Until imaging is obtained, patient will need to protect himself from falls and avoid excessive overhead lifting and neck strain.     Case discussed with Dr. Abbott

## 2024-04-30 NOTE — PLAN OF CARE
[facility] - [place] - Observation  Discharge Final Note    Primary Care Provider: Anival Rose MD  Case Management Final Discharge Note      Discharge Disposition: home    New DME ordered / company name: none    Relevant SDOH / Transition of Care Barriers:  none    Primary Caretaker and contact information: self    Scheduled followup appointment: neurology  5/14/2024    Referrals placed: neuro    Transportation: self/family        Patient and family educated on discharge services and updated on DC plan. Bedside RN notified, patient clear to discharge from Case Management Perspective.     Expected Discharge Date: 4/30/2024    Final Discharge Note (most recent)       Final Note - 04/30/24 1700          Final Note    Assessment Type Final Discharge Note     Anticipated Discharge Disposition Home or Self Care     What phone number can be called within the next 1-3 days to see how you are doing after discharge? [phone]     Hospital Resources/Appts/Education Provided Appointments scheduled and added to AVS        Post-Acute Status    Discharge Delays None known at this time                     Important Message from Medicare             Contact Info       Karina Trevino PA-C   Specialty: Neurosurgery    [address]  [address]  [facility] [address]   Phone: [phone]       Next Steps: Follow up on 5/14/2024    Instructions: Neurosurgery follow-up at 2:40am    Anival Rose MD   Specialty: Internal Medicine, Wound Care   Relationship: PCP - General    [address] [facility] [address]   Phone: [phone]       Next Steps: Follow up in 1 week(s)

## 2024-04-30 NOTE — SUBJECTIVE & OBJECTIVE
Medications Prior to Admission   Medication Sig Dispense Refill Last Dose    fluticasone propionate (FLONASE) 50 mcg/actuation nasal spray 1 spray (50 mcg total) by Each Nostril route 2 (two) times daily. 16 g 1 Past Week    cyclobenzaprine (FLEXERIL) 10 MG tablet Take 1 tablet (10 mg total) by mouth 3 (three) times daily as needed for Muscle spasms (muscle tightness). 30 tablet 1 More than a month    gabapentin (NEURONTIN) 300 MG capsule Take 1 capsule (300 mg total) by mouth every evening. 30 capsule 2 More than a month    sildenafiL (VIAGRA) 100 MG tablet Take 1 tablet (100 mg total) by mouth daily as needed for Erectile Dysfunction. 12 tablet 2        Review of patient's allergies indicates:   Allergen Reactions    Pravastatin      Myalgia with Elevated CK       Past Medical History:   Diagnosis Date    High cholesterol     Hypertension      Past Surgical History:   Procedure Laterality Date    CHOLECYSTECTOMY  08/2017    COLONOSCOPY N/A 12/15/2015    Procedure: COLONOSCOPY;  Surgeon: Horace Bella MD;  Location: [facility];  Service: Endoscopy;  Laterality: N/A;     Family History       Problem Relation (Age of Onset)    Coronary artery disease Father    Heart disease Maternal Grandfather, Paternal Grandfather    Hypertension Mother, Brother    Transient ischemic attack Mother          Tobacco Use    Smoking status: Never    Smokeless tobacco: Never   Substance and Sexual Activity    Alcohol use: Yes     Comment: occasional, once a month 1-2 mixed vodka drink    Drug use: No    Sexual activity: Yes     Partners: Female     Birth control/protection: None     Review of Systems   Gastrointestinal:  Negative for constipation.   Genitourinary:  Negative for difficulty urinating.   Musculoskeletal:  Positive for back pain and neck pain. Negative for gait problem.   Skin:  Negative for wound.   Neurological:  Positive for dizziness. Negative for weakness and numbness.   Psychiatric/Behavioral:  Negative for  confusion.      Objective:     Weight: 90.8 kg (200 lb 2.8 oz)  Body mass index is 26.41 kg/m².  Vital Signs (Most Recent):  Temp: 97.6 °F (36.4 °C) (04/30/24 0739)  Pulse: (!) 56 (04/30/24 0739)  Resp: 19 (04/30/24 0739)  BP: (!) 141/76 (04/30/24 0739)  SpO2: 99 % (04/30/24 0739) Vital Signs (24h Range):  Temp:  [97.5 °F (36.4 °C)-98.4 °F (36.9 °C)] 97.6 °F (36.4 °C)  Pulse:  [56-82] 56  Resp:  [16-19] 19  SpO2:  [98 %-99 %] 99 %  BP: (122-153)/(60-86) 141/76          Physical Exam     Neurosurgery Physical Exam  General: well developed, well nourished, no distress.   Head: normocephalic, atraumatic  Neurologic: Awake, Alert, Oriented x 4. Thought content appropriate.  GCS: Motor: 6/Verbal: 5/Eyes: 4 GCS Total: 15  Language: No aphasia  Speech: No dysarthria  Cranial nerves: face symmetric, tongue midline, CN II-XII grossly intact.   Eyes: pupils equal, round, reactive to light with accomodation, EOMI.  Pulmonary: normal respirations, no signs of respiratory distress  Abdomen: soft, non-distended, not tender to palpation    Sensory: intact to light touch throughout b/l upper and lower extremities    Motor Strength: Moves all extremities spontaneously with good tone.  Full strength upper and lower extremities. No abnormal movements seen.     Strength  Deltoids Triceps Biceps Wrist Extension Wrist Flexion Hand  FA   Upper: R 5/5 5/5 5/5 5/5 5/5 5/5 5/5    L 5/5 5/5 5/5 5/5 5/5 5/5 5/5     Iliopsoas   Tibialis  anterior Gastro- cnemius EHL    Lower: R 5/5   5/5 5/5 5/5     L 5/5   5/5 5/5 5/5      DTR's: 2 + and symmetric in patellar     Martinez: absent  Clonus: absent    Skin: Skin is warm, dry and intact.    Significant Labs:  Recent Labs   Lab 04/28/24  1629         K 4.6      CO2 26   BUN 22*   CREATININE 0.9   CALCIUM 9.3     Recent Labs   Lab 04/28/24  1628 04/28/24  1629   WBC  --  3.17*   HGB  --  12.0*   HCT 35* 36.3*   PLT  --  165     Recent Labs   Lab 04/28/24  1629   INR 1.0      Microbiology Results (last 7 days)       ** No results found for the last 168 hours. **              Significant Diagnostics:  I have personally reviewed imaging and agree with the findings.     CTA head and neck 04/29/2024   No acute intracranial abnormality.  No high-grade stenosis or major vessel occlusion.     Duplicated left cervical vertebral artery, as above.  This is a normal variant.     C3-4 central disc protrusion with superior extension behind the C3 body producing spinal canal stenosis.  Clinical correlation should be made with any history of cervical spine symptoms.       MRI brain without contrast 04/29/2024   No acute abnormality.     CT head without contrast 4/28/24  No acute intracranial process.  Additional evaluation with MRI of the brain, as clinically warranted.

## 2024-05-01 ENCOUNTER — PATIENT MESSAGE (OUTPATIENT)
Dept: ADMINISTRATIVE | Facility: CLINIC | Age: 49
End: 2024-05-01
Payer: COMMERCIAL

## 2024-05-01 ENCOUNTER — PATIENT OUTREACH (OUTPATIENT)
Dept: ADMINISTRATIVE | Facility: CLINIC | Age: 49
End: 2024-05-01
Payer: COMMERCIAL

## 2024-05-01 NOTE — PROGRESS NOTES
C3 nurse attempted to contact Layla Siegel for a TCC post hospital discharge follow up call. No answer. Left voicemail with callback information. The patient does not have a scheduled HOSFU appointment. Message sent to PCP staff for assistance with scheduling visit with patient.

## 2024-05-01 NOTE — PROGRESS NOTES
C3 nurse spoke with Layla Siegel for a TCC post hospital discharge follow up call. The patient has a scheduled HOSFU appointment with Uriah Gregg NP  on 05/10/24 @ 1400.

## 2024-05-10 ENCOUNTER — OFFICE VISIT (OUTPATIENT)
Dept: FAMILY MEDICINE | Facility: CLINIC | Age: 49
End: 2024-05-10
Payer: COMMERCIAL

## 2024-05-10 VITALS
RESPIRATION RATE: 15 BRPM | BODY MASS INDEX: 28.14 KG/M2 | SYSTOLIC BLOOD PRESSURE: 132 MMHG | OXYGEN SATURATION: 96 % | HEART RATE: 86 BPM | DIASTOLIC BLOOD PRESSURE: 82 MMHG | TEMPERATURE: 99 F | WEIGHT: 212.31 LBS | HEIGHT: 73 IN

## 2024-05-10 DIAGNOSIS — Z09 HOSPITAL DISCHARGE FOLLOW-UP: Primary | ICD-10-CM

## 2024-05-10 DIAGNOSIS — R74.01 TRANSAMINITIS: ICD-10-CM

## 2024-05-10 DIAGNOSIS — R42 DIZZINESS: ICD-10-CM

## 2024-05-10 DIAGNOSIS — G47.33 OSA (OBSTRUCTIVE SLEEP APNEA): ICD-10-CM

## 2024-05-10 PROCEDURE — 3075F SYST BP GE 130 - 139MM HG: CPT | Mod: CPTII,S$GLB,, | Performed by: NURSE PRACTITIONER

## 2024-05-10 PROCEDURE — 1159F MED LIST DOCD IN RCRD: CPT | Mod: CPTII,S$GLB,, | Performed by: NURSE PRACTITIONER

## 2024-05-10 PROCEDURE — 99214 OFFICE O/P EST MOD 30 MIN: CPT | Mod: S$GLB,,, | Performed by: NURSE PRACTITIONER

## 2024-05-10 PROCEDURE — 3008F BODY MASS INDEX DOCD: CPT | Mod: CPTII,S$GLB,, | Performed by: NURSE PRACTITIONER

## 2024-05-10 PROCEDURE — 3079F DIAST BP 80-89 MM HG: CPT | Mod: CPTII,S$GLB,, | Performed by: NURSE PRACTITIONER

## 2024-05-10 PROCEDURE — 1160F RVW MEDS BY RX/DR IN RCRD: CPT | Mod: CPTII,S$GLB,, | Performed by: NURSE PRACTITIONER

## 2024-05-10 PROCEDURE — 99999 PR PBB SHADOW E&M-EST. PATIENT-LVL V: CPT | Mod: PBBFAC,,, | Performed by: NURSE PRACTITIONER

## 2024-05-10 NOTE — PROGRESS NOTES
Routine Office Visit    Patient Name: Layla Siegel    : 1975  MRN: [id number]    Chief Complaint:  Hospital follow-up    Subjective:  Layla is a 48 y.o. male who presents today for a hospital follow-up.  Discharge summary is as follows in bold:    [facility] Medicine  Discharge Summary        Patient Name: Layla Siegel  MRN: [id number]  SHYAM: [id number]  Patient Class: OP- Observation  Admission Date: 2024  Hospital Length of Stay: 0 days  Discharge Date and Time:  2024 3:55 PM  Attending Physician: Bruce Brizuela MD   Discharging Provider: Bruce Brizuela MD  Primary Care Provider: Anival Rose MD     Primary Care Team: Networked reference to record PCT      HPI:   48 y.o. male with hyperlipidemia presents complaint of dizziness.  Acute onset, described as a room spinning sensation, associated with sinus congestion, rhinorrhea, and sensation of left ear fullness.  He noted that changing positions with orthostatic vital signs exacerbated dizziness.  Denies, chills, cough, SOB, chest pain, palpitations, syncope.  In the ED, workup unremarkable for acute abnormality.  Placed in observation for MRI brain and neurology evaluation.     * No surgery found *       Hospital Course:   Admitted with dizziness, MRI and CTA negative - suspect peripheral vertigo now resolved. Incidentally found C3-C4 disc protrusion with spinal cord narrowing. Neurology recommending MRI c-spine and NSGY consult.  MRI showing posterior central, cranially extending disc extrusion at C3-C4 contributes to severe spinal canal stenosis with associated cord compression.  Neurosurgery evaluated patient.  Recommend FU that has been scheduled for . Advised to go to the ER with acute neurologic changes. He has remained afebrile and hemodynamically stable. He will be discharged home to follow up with PCP and Neurosurgery.     Patient who is known to me reports today for evaluation.  Since discharged he  reports being generally stable although he has been very fatigued as of late.  He notes a history of sleep apnea but has not been wearing the CPAP machine at night currently.  He has lost a considerable amount of weight since gastric bypass surgery.     He has not had any dizziness or vertigo since leaving the hospital but does not feel back to his usual.  He denies any severe neck pain or arm weakness or paresthesias.  He does have a scheduled follow-up with Neurosurgery.    He denies any other acute problems or concerns today.    Past Medical History  Past Medical History:   Diagnosis Date    High cholesterol     Hypertension        Family History  Family History   Problem Relation Name Age of Onset    Hypertension Mother      Transient ischemic attack Mother      Heart disease Maternal Grandfather      Heart disease Paternal Grandfather      Coronary artery disease Father          s/p cabg    Hypertension Brother         Current Medications  Current Outpatient Medications on File Prior to Visit   Medication Sig Dispense Refill    cyclobenzaprine (FLEXERIL) 10 MG tablet Take 1 tablet (10 mg total) by mouth 3 (three) times daily as needed for Muscle spasms (muscle tightness). 30 tablet 1    fluticasone propionate (FLONASE) 50 mcg/actuation nasal spray 1 spray (50 mcg total) by Each Nostril route 2 (two) times daily. (Patient taking differently: 1 spray by Each Nostril route 2 (two) times daily as needed for Rhinitis.) 16 g 1    gabapentin (NEURONTIN) 300 MG capsule Take 1 capsule (300 mg total) by mouth every evening. (Patient taking differently: Take 300 mg by mouth as needed.) 30 capsule 2    [DISCONTINUED] sildenafiL (VIAGRA) 100 MG tablet Take 1 tablet (100 mg total) by mouth daily as needed for Erectile Dysfunction. (Patient not taking: Reported on 5/1/2024) 12 tablet 2     No current facility-administered medications on file prior to visit.       Allergies   Review of patient's allergies indicates:   Allergen  "Reactions    Pravastatin      Myalgia with Elevated CK       Review of Systems (Pertinent positives)  Review of Systems   Constitutional: Negative.  Negative for chills and fever.   HENT: Negative.  Negative for congestion, sinus pain and sore throat.    Eyes: Negative.    Respiratory:  Negative for cough, shortness of breath and wheezing.    Cardiovascular:  Negative for chest pain, palpitations, orthopnea and claudication.   Gastrointestinal: Negative.  Negative for abdominal pain, diarrhea, nausea and vomiting.   Genitourinary: Negative.  Negative for dysuria, frequency and urgency.   Musculoskeletal: Negative.  Negative for back pain, joint pain and neck pain.   Skin: Negative.    Neurological: Negative.  Negative for dizziness, tingling, loss of consciousness and headaches.   Endo/Heme/Allergies: Negative.    Psychiatric/Behavioral: Negative.         /82 (BP Location: Left arm, Patient Position: Sitting, BP Method: Medium (Manual))   Pulse 86   Temp 98.6 °F (37 °C) (Oral)   Resp 15   Ht 6' 1" (1.854 m)   Wt 96.3 kg (212 lb 4.9 oz)   SpO2 96%   BMI 28.01 kg/m²     Physical Exam  Vitals reviewed.   Constitutional:       General: He is not in acute distress.     Appearance: Normal appearance. He is not ill-appearing, toxic-appearing or diaphoretic.   HENT:      Head: Normocephalic and atraumatic.   Cardiovascular:      Rate and Rhythm: Normal rate and regular rhythm.      Pulses: Normal pulses.      Heart sounds: Normal heart sounds.   Pulmonary:      Effort: Pulmonary effort is normal. No respiratory distress.      Breath sounds: Normal breath sounds. No wheezing.   Abdominal:      General: Bowel sounds are normal. There is no distension.      Palpations: Abdomen is soft.      Tenderness: There is no abdominal tenderness.   Musculoskeletal:         General: No swelling, tenderness or deformity. Normal range of motion.   Skin:     General: Skin is warm and dry.      Capillary Refill: Capillary refill " takes less than 2 seconds.   Neurological:      General: No focal deficit present.      Mental Status: He is alert and oriented to person, place, and time.   Psychiatric:         Mood and Affect: Mood normal.         Behavior: Behavior normal.          Assessment/Plan:  Layla Siegel is a 48 y.o. male who presents today for :    Layla was seen today for dizziness and fatigue.    Diagnoses and all orders for this visit:    Hospital discharge follow-up    As below.    Dizziness  -     Ambulatory referral/consult to Neurology; Future    Dizziness resolved since hospitalization.  Will consult Neurology for follow-up.  May need to consider ENT referral as well for vestibular testing or vestibular physical therapy.  No alarming neurologic signs or symptoms today. No ENT symptoms today. Grossly normal neuro and ENT exam today.    CARMITA (obstructive sleep apnea)  -     Home Sleep Study; Future  -     Ambulatory referral/consult to Sleep Disorders; Future    Patient with persistent fatigue throughout the day.  Recommended patient to restart CPAP nightly.  Home sleep study ordered.  Presumptive referral to sleep Medicine placed.    Transaminitis  -     HEPATIC FUNCTION PANEL; Future    History of steatosis from abdominal ultrasound in 2019.  Recheck hepatic function panel in 2 weeks.  May need to consider repeat imaging or hepatology referral for potential FibroScan if needed.        This office note has been dictated.  This dictation has been generated using M-Modal Fluency Direct dictation; some phonetic errors may occur.

## 2024-05-14 ENCOUNTER — OFFICE VISIT (OUTPATIENT)
Dept: NEUROSURGERY | Facility: CLINIC | Age: 49
End: 2024-05-14
Payer: COMMERCIAL

## 2024-05-14 VITALS
WEIGHT: 215.19 LBS | BODY MASS INDEX: 28.52 KG/M2 | HEART RATE: 60 BPM | DIASTOLIC BLOOD PRESSURE: 81 MMHG | HEIGHT: 73 IN | OXYGEN SATURATION: 98 % | SYSTOLIC BLOOD PRESSURE: 138 MMHG

## 2024-05-14 DIAGNOSIS — M50.20 CERVICAL DISC HERNIATION: Primary | ICD-10-CM

## 2024-05-14 PROCEDURE — 3079F DIAST BP 80-89 MM HG: CPT | Mod: CPTII,S$GLB,, | Performed by: PHYSICIAN ASSISTANT

## 2024-05-14 PROCEDURE — 3008F BODY MASS INDEX DOCD: CPT | Mod: CPTII,S$GLB,, | Performed by: PHYSICIAN ASSISTANT

## 2024-05-14 PROCEDURE — 1160F RVW MEDS BY RX/DR IN RCRD: CPT | Mod: CPTII,S$GLB,, | Performed by: PHYSICIAN ASSISTANT

## 2024-05-14 PROCEDURE — 1159F MED LIST DOCD IN RCRD: CPT | Mod: CPTII,S$GLB,, | Performed by: PHYSICIAN ASSISTANT

## 2024-05-14 PROCEDURE — 99214 OFFICE O/P EST MOD 30 MIN: CPT | Mod: S$GLB,,, | Performed by: PHYSICIAN ASSISTANT

## 2024-05-14 PROCEDURE — 3075F SYST BP GE 130 - 139MM HG: CPT | Mod: CPTII,S$GLB,, | Performed by: PHYSICIAN ASSISTANT

## 2024-05-14 NOTE — PROGRESS NOTES
[facility]  Neurosurgery    SUBJECTIVE:     Interval history 05/14/2024:   Patient returns to clinic for follow up of an incidentally noted C3-4 disc herniation causing cord deformity and moderate central stenosis.  He denies any symptoms of radiculopathy or myelopathy.  He remains intact on exam.  He has returned to work.  Complaints of dizziness/vertigo that brought him to the hospital in April have since resolved.  He had 1 episode of nausea that resolved with eating.      History of Present Illness hospital admission 04/30/2024: Layla Siegel is a 48-year-old male who presented to [facility] ED on 04/28/2024 with complaints of dizziness and vertigo x1 day.  Symptoms were worst on Sunday with about 8 episodes of dizziness and seemed to improve on Monday.  No dizziness today.  Patient reports the room spinning on him in a wave-like motion.  He has never had this before.  MRI brain, CT head, and CTA head and neck were obtained to r/o stroke and a C3-4 disc herniation was incidentally noted on CTA.  Neurosurgery was consulted, and MRI cervical is pending.  Patient denies h/o fine motor dysfunction, upper extremity numbness or weakness, gait disturbance, b/b dysfunction, and SA.  His legs gave out on him once while jumping on a trampoline last year.  Otherwise denies any significant weakness.He does note intermittent numbness in the right ulnar distribution after his elbow rests on a hard surface for prolonged period of time.  He also notes intermittent numbness and pain in his right posterolateral thigh over the last 2 years.  More recent onset of left anterior shin and foot Charley horse sensations.  Lastly, he notes more recent onset of posterior neck pain.  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  Neither involves heavy lifting, but there is frequent neck flexion during these occupations.  Patient lost approximately 95 lb after weight loss surgery.   "Walks regularly.      (Not in a hospital admission)      Review of patient's allergies indicates:   Allergen Reactions    Pravastatin      Myalgia with Elevated CK       Past Medical History:   Diagnosis Date    Benign paroxysmal vertigo, bilateral     High cholesterol     Hypertension      Past Surgical History:   Procedure Laterality Date    CHOLECYSTECTOMY  08/2017    COLONOSCOPY N/A 12/15/2015    Procedure: COLONOSCOPY;  Surgeon: Horace Bella MD;  Location: [facility];  Service: Endoscopy;  Laterality: N/A;     Family History   Problem Relation Name Age of Onset    Hypertension Mother      Transient ischemic attack Mother      Heart disease Maternal Grandfather      Heart disease Paternal Grandfather      Coronary artery disease Father          s/p cabg    Hypertension Brother       Social History     Tobacco Use    Smoking status: Never    Smokeless tobacco: Never   Substance Use Topics    Alcohol use: Yes     Comment: occasional, once a month 1-2 mixed vodka drink    Drug use: No        Review of Systems:  As noted in HPI    OBJECTIVE:     Vital Signs (Most Recent):  Vitals:    05/14/24 1446   BP: 138/81   Pulse: 60   SpO2: 98%   Weight: 97.6 kg (215 lb 2.7 oz)   Height: 6' 1" (1.854 m)   PainSc: 0-No pain         Physical Exam:  General: well developed, well nourished, no distress  Head: normocephalic, atraumatic  Neurologic: Alert and oriented. Thought content appropriate  GCS: Motor: 6/Verbal: 5/Eyes: 4 GCS Total: 15  Language: No aphasia  Speech: No dysarthria  Cranial nerves: face symmetric, tongue midline, CN II-XII grossly intact.   Eyes: pupils equal, round, reactive to light with accommodation, EOMI.   Pulmonary: normal respirations, not labored, no accessory muscles used    Sensory: intact to light touch throughout b/l upper and lower extremities    Motor Strength: Moves all extremities spontaneously with good tone.  Full strength upper and lower extremities. No abnormal movements seen. "     Strength  Deltoids Triceps Biceps Wrist Extension Wrist Flexion Hand  FA   Upper: R 5/5 5/5 5/5 5/5 5/5 5/5 5/5    L 5/5 5/5 5/5 5/5 5/5 5/5 5/5     Iliopsoas Quadriceps  Tibialis  anterior Gastro- cnemius     Lower: R 5/5 5/5  5/5 5/5      L 5/5 5/5  5/5 5/5       DTR's - 1 + and symmetric patellar  Martinez: absent  Clonus: absent    Gait: normal      Diagnostic Results:  I have personally reviewed imaging and agree with the findings.     MRI cervical spine, 04/30/2024:  Posterior central, cranially extending disc extrusion at C3-C4 contributes to moderate spinal canal stenosis with associated cord compression/deformity.    CTA head and neck 04/29/2024   No acute intracranial abnormality.  No high-grade stenosis or major vessel occlusion.     Duplicated left cervical vertebral artery, as above.  This is a normal variant.     C3-4 central disc protrusion with superior extension behind the C3 body producing spinal canal stenosis.  Clinical correlation should be made with any history of cervical spine symptoms    Prominent anterior osteophytes throughout the mid and lower cervical spine        ASSESSMENT/PLAN:     Layla Siegel is a 48 y.o. male who presents for hospital follow-up regarding incidentally noted C3-4 disc herniation with cord deformity and moderate to severe central stenosis.  He remains intact on exam and denies any red flag symptoms.  We discussed some of the pros and cons to watchful waiting versus prophylactic ACDF given his young age.  We will plan for follow up in 6 weeks.  Patient we will call with any new or progressive symptoms.  I will also discuss timing of surgery with Dr. Abbott      Please feel free to call with any further questions      Karina Trevino PA-C  [facility]  Department of Neurosurgery  [phone]    Disclaimer: This note was dictated by speech recognition. Minor errors in transcription may be present.  Please call with any questions.

## 2024-05-24 ENCOUNTER — LAB VISIT (OUTPATIENT)
Dept: LAB | Facility: HOSPITAL | Age: 49
End: 2024-05-24
Payer: COMMERCIAL

## 2024-05-24 DIAGNOSIS — R74.01 TRANSAMINITIS: ICD-10-CM

## 2024-05-24 LAB
ALBUMIN SERPL BCP-MCNC: 4.1 G/DL (ref 3.5–5.2)
ALP SERPL-CCNC: 102 U/L (ref 55–135)
ALT SERPL W/O P-5'-P-CCNC: 56 U/L (ref 10–44)
AST SERPL-CCNC: 25 U/L (ref 10–40)
BILIRUB DIRECT SERPL-MCNC: 0.2 MG/DL (ref 0.1–0.3)
BILIRUB SERPL-MCNC: 0.6 MG/DL (ref 0.1–1)
PROT SERPL-MCNC: 7.1 G/DL (ref 6–8.4)

## 2024-05-24 PROCEDURE — 80076 HEPATIC FUNCTION PANEL: CPT | Performed by: NURSE PRACTITIONER

## 2024-05-24 PROCEDURE — 36415 COLL VENOUS BLD VENIPUNCTURE: CPT | Mod: PN | Performed by: NURSE PRACTITIONER

## 2024-06-21 ENCOUNTER — OFFICE VISIT (OUTPATIENT)
Dept: FAMILY MEDICINE | Facility: CLINIC | Age: 49
End: 2024-06-21
Payer: COMMERCIAL

## 2024-06-21 ENCOUNTER — LAB VISIT (OUTPATIENT)
Dept: LAB | Facility: HOSPITAL | Age: 49
End: 2024-06-21
Attending: INTERNAL MEDICINE
Payer: COMMERCIAL

## 2024-06-21 VITALS
HEIGHT: 73 IN | DIASTOLIC BLOOD PRESSURE: 72 MMHG | TEMPERATURE: 98 F | HEART RATE: 54 BPM | OXYGEN SATURATION: 98 % | BODY MASS INDEX: 28.78 KG/M2 | SYSTOLIC BLOOD PRESSURE: 134 MMHG | WEIGHT: 217.13 LBS

## 2024-06-21 DIAGNOSIS — H81.10 BENIGN PAROXYSMAL POSITIONAL VERTIGO, UNSPECIFIED LATERALITY: ICD-10-CM

## 2024-06-21 DIAGNOSIS — R73.01 IMPAIRED FASTING GLUCOSE: ICD-10-CM

## 2024-06-21 DIAGNOSIS — Z98.84 S/P GASTRIC BYPASS: ICD-10-CM

## 2024-06-21 DIAGNOSIS — Z98.84 S/P GASTRIC BYPASS: Primary | ICD-10-CM

## 2024-06-21 LAB
ALBUMIN SERPL BCP-MCNC: 4.1 G/DL (ref 3.5–5.2)
ALP SERPL-CCNC: 103 U/L (ref 55–135)
ALT SERPL W/O P-5'-P-CCNC: 69 U/L (ref 10–44)
ANION GAP SERPL CALC-SCNC: 9 MMOL/L (ref 8–16)
AST SERPL-CCNC: 36 U/L (ref 10–40)
BASOPHILS # BLD AUTO: 0.03 K/UL (ref 0–0.2)
BASOPHILS NFR BLD: 0.7 % (ref 0–1.9)
BILIRUB SERPL-MCNC: 0.4 MG/DL (ref 0.1–1)
BUN SERPL-MCNC: 16 MG/DL (ref 6–20)
CALCIUM SERPL-MCNC: 9.5 MG/DL (ref 8.7–10.5)
CHLORIDE SERPL-SCNC: 107 MMOL/L (ref 95–110)
CO2 SERPL-SCNC: 28 MMOL/L (ref 23–29)
CREAT SERPL-MCNC: 1 MG/DL (ref 0.5–1.4)
DIFFERENTIAL METHOD BLD: ABNORMAL
EOSINOPHIL # BLD AUTO: 0.1 K/UL (ref 0–0.5)
EOSINOPHIL NFR BLD: 2.6 % (ref 0–8)
ERYTHROCYTE [DISTWIDTH] IN BLOOD BY AUTOMATED COUNT: 13.2 % (ref 11.5–14.5)
EST. GFR  (NO RACE VARIABLE): >60 ML/MIN/1.73 M^2
FERRITIN SERPL-MCNC: 289 NG/ML (ref 20–300)
GLUCOSE SERPL-MCNC: 91 MG/DL (ref 70–110)
HCT VFR BLD AUTO: 37.6 % (ref 40–54)
HGB BLD-MCNC: 12.8 G/DL (ref 14–18)
IMM GRANULOCYTES # BLD AUTO: 0.01 K/UL (ref 0–0.04)
IMM GRANULOCYTES NFR BLD AUTO: 0.2 % (ref 0–0.5)
IRON SERPL-MCNC: 76 UG/DL (ref 45–160)
LYMPHOCYTES # BLD AUTO: 1.9 K/UL (ref 1–4.8)
LYMPHOCYTES NFR BLD: 43.9 % (ref 18–48)
MCH RBC QN AUTO: 30.5 PG (ref 27–31)
MCHC RBC AUTO-ENTMCNC: 34 G/DL (ref 32–36)
MCV RBC AUTO: 90 FL (ref 82–98)
MONOCYTES # BLD AUTO: 0.3 K/UL (ref 0.3–1)
MONOCYTES NFR BLD: 7 % (ref 4–15)
NEUTROPHILS # BLD AUTO: 1.9 K/UL (ref 1.8–7.7)
NEUTROPHILS NFR BLD: 45.6 % (ref 38–73)
NRBC BLD-RTO: 0 /100 WBC
PLATELET # BLD AUTO: 175 K/UL (ref 150–450)
PMV BLD AUTO: 9.5 FL (ref 9.2–12.9)
POTASSIUM SERPL-SCNC: 5.2 MMOL/L (ref 3.5–5.1)
PROT SERPL-MCNC: 7.2 G/DL (ref 6–8.4)
RBC # BLD AUTO: 4.2 M/UL (ref 4.6–6.2)
SATURATED IRON: 20 % (ref 20–50)
SODIUM SERPL-SCNC: 144 MMOL/L (ref 136–145)
TOTAL IRON BINDING CAPACITY: 371 UG/DL (ref 250–450)
TRANSFERRIN SERPL-MCNC: 251 MG/DL (ref 200–375)
WBC # BLD AUTO: 4.26 K/UL (ref 3.9–12.7)

## 2024-06-21 PROCEDURE — 83036 HEMOGLOBIN GLYCOSYLATED A1C: CPT | Performed by: INTERNAL MEDICINE

## 2024-06-21 PROCEDURE — 83540 ASSAY OF IRON: CPT | Performed by: INTERNAL MEDICINE

## 2024-06-21 PROCEDURE — 82728 ASSAY OF FERRITIN: CPT | Performed by: INTERNAL MEDICINE

## 2024-06-21 PROCEDURE — 83921 ORGANIC ACID SINGLE QUANT: CPT | Performed by: INTERNAL MEDICINE

## 2024-06-21 PROCEDURE — 82746 ASSAY OF FOLIC ACID SERUM: CPT | Performed by: INTERNAL MEDICINE

## 2024-06-21 PROCEDURE — 36415 COLL VENOUS BLD VENIPUNCTURE: CPT | Mod: PN | Performed by: INTERNAL MEDICINE

## 2024-06-21 PROCEDURE — 80053 COMPREHEN METABOLIC PANEL: CPT | Performed by: INTERNAL MEDICINE

## 2024-06-21 PROCEDURE — 82607 VITAMIN B-12: CPT | Performed by: INTERNAL MEDICINE

## 2024-06-21 PROCEDURE — 99999 PR PBB SHADOW E&M-EST. PATIENT-LVL V: CPT | Mod: PBBFAC,,, | Performed by: INTERNAL MEDICINE

## 2024-06-21 PROCEDURE — 82306 VITAMIN D 25 HYDROXY: CPT | Performed by: INTERNAL MEDICINE

## 2024-06-21 PROCEDURE — 85025 COMPLETE CBC W/AUTO DIFF WBC: CPT | Performed by: INTERNAL MEDICINE

## 2024-06-21 NOTE — PROGRESS NOTES
"Subjective:       Patient ID: Layla Siegel is a 48 y.o. male.    Chief Complaint: Follow-up (6m f/u)    F/u chronic conditions    HPI: 47 y/o presents with wife for followup. Two months ago admitted with positional vertigo after one week of URI sx's evaluation for CNS process was reassuring (no ischemic changes) but noted incidentally was a cervical disc bulge at C3-C4 causing moderate spinal stenosis. He reports vertigo has completely resolved. He denies any hand parathesia or  weakness no difficulty or pain with next extension or flexion or abduction of either shoulder    PMHx: Gastric sleeve procedure for weight loss in June 2023 he is taking daily mvi with iron and vitamin d      Review of Systems   Constitutional:  Negative for activity change, fever and unexpected weight change.   HENT:  Negative for congestion, rhinorrhea, sore throat and trouble swallowing.    Eyes:  Negative for photophobia and redness.   Respiratory:  Negative for cough, chest tightness, shortness of breath and wheezing.    Cardiovascular:  Negative for chest pain, palpitations and leg swelling.   Gastrointestinal:  Negative for abdominal pain, blood in stool, constipation, diarrhea, nausea and vomiting.   Endocrine: Negative for cold intolerance, heat intolerance and polyuria.   Genitourinary:  Negative for decreased urine volume, difficulty urinating, dysuria and urgency.   Musculoskeletal:  Negative for arthralgias and back pain.   Skin:  Negative for rash.   Neurological:  Negative for dizziness, syncope, weakness and headaches.   Psychiatric/Behavioral:  Negative for dysphoric mood, sleep disturbance and suicidal ideas.        Objective:     Vitals:    06/21/24 1028 06/21/24 1047   BP: (!) 148/90 134/72   BP Location: Right arm    Patient Position: Sitting    BP Method: Large (Manual)    Pulse: (!) 53 (!) 54   Temp: 97.7 °F (36.5 °C)    TempSrc: Oral    SpO2: 98%    Weight: 98.5 kg (217 lb 2.5 oz)    Height: 6' 1" (1.854 m)  "          Physical Exam  Constitutional:       Appearance: He is well-developed.   HENT:      Head: Normocephalic and atraumatic.   Eyes:      General: No scleral icterus.     Conjunctiva/sclera: Conjunctivae normal.   Cardiovascular:      Rate and Rhythm: Normal rate and regular rhythm.      Heart sounds: No murmur heard.     No friction rub. No gallop.   Pulmonary:      Effort: Pulmonary effort is normal.      Breath sounds: Normal breath sounds. No wheezing or rales.   Abdominal:      Palpations: Abdomen is soft.      Tenderness: There is no abdominal tenderness. There is no guarding or rebound.   Musculoskeletal:         General: No tenderness. Normal range of motion.      Cervical back: Normal range of motion.      Right lower leg: No edema.      Left lower leg: No edema.   Skin:     General: Skin is warm and dry.   Neurological:      Mental Status: He is alert and oriented to person, place, and time.      Cranial Nerves: No cranial nerve deficit.         Assessment and Plan   1. S/P gastric bypass  Continue vitamin supplment check labs today to ensure adequate replacement  - Vitamin D; Future  - Vitamin B12; Future  - METHYLMALONIC ACID, SERUM; Future  - Folate; Future  - Iron and TIBC; Future  - Ferritin; Future  - Comprehensive Metabolic Panel; Future  - CBC Without Differential; Future    2. Benign paroxysmal positional vertigo, unspecified laterality  resolved    3. Impaired fasting glucose  Screen for DM wsith a1c  - Hemoglobin A1C; Future

## 2024-06-22 LAB
25(OH)D3+25(OH)D2 SERPL-MCNC: 29 NG/ML (ref 30–96)
ESTIMATED AVG GLUCOSE: 114 MG/DL (ref 68–131)
FOLATE SERPL-MCNC: 12.3 NG/ML (ref 4–24)
HBA1C MFR BLD: 5.6 % (ref 4–5.6)
VIT B12 SERPL-MCNC: 873 PG/ML (ref 210–950)

## 2024-06-27 LAB — METHYLMALONATE SERPL-SCNC: 0.19 UMOL/L

## 2024-07-03 ENCOUNTER — PATIENT OUTREACH (OUTPATIENT)
Dept: ADMINISTRATIVE | Facility: HOSPITAL | Age: 49
End: 2024-07-03
Payer: COMMERCIAL

## 2024-07-17 ENCOUNTER — HOSPITAL ENCOUNTER (EMERGENCY)
Facility: HOSPITAL | Age: 49
Discharge: HOME | End: 2024-07-17
Attending: EMERGENCY MEDICINE
Payer: COMMERCIAL

## 2024-07-17 VITALS
TEMPERATURE: 98 F | SYSTOLIC BLOOD PRESSURE: 160 MMHG | HEART RATE: 66 BPM | DIASTOLIC BLOOD PRESSURE: 82 MMHG | HEIGHT: 73 IN | WEIGHT: 226.44 LBS | OXYGEN SATURATION: 98 % | RESPIRATION RATE: 18 BRPM | BODY MASS INDEX: 30.01 KG/M2

## 2024-07-17 DIAGNOSIS — H92.01 ACUTE OTALGIA, RIGHT: ICD-10-CM

## 2024-07-17 DIAGNOSIS — H66.001 ACUTE SUPPURATIVE OTITIS MEDIA OF RIGHT EAR WITHOUT SPONTANEOUS RUPTURE OF TYMPANIC MEMBRANE, RECURRENCE NOT SPECIFIED: Primary | ICD-10-CM

## 2024-07-17 PROCEDURE — 25000003 PHARM REV CODE 250: Performed by: EMERGENCY MEDICINE

## 2024-07-17 PROCEDURE — 25000003 PHARM REV CODE 250: Performed by: PHYSICIAN ASSISTANT

## 2024-07-17 PROCEDURE — 99283 EMERGENCY DEPT VISIT LOW MDM: CPT

## 2024-07-17 RX ORDER — AMOXICILLIN AND CLAVULANATE POTASSIUM 875; 125 MG/1; MG/1
1 TABLET, FILM COATED ORAL
Status: COMPLETED | OUTPATIENT
Start: 2024-07-17 | End: 2024-07-17

## 2024-07-17 RX ORDER — IBUPROFEN 600 MG/1
TABLET ORAL
Status: DISCONTINUED
Start: 2024-07-17 | End: 2024-07-17 | Stop reason: HOSPADM

## 2024-07-17 RX ORDER — IBUPROFEN 600 MG/1
600 TABLET ORAL
Status: DISCONTINUED | OUTPATIENT
Start: 2024-07-17 | End: 2024-07-17

## 2024-07-17 RX ORDER — AMOXICILLIN AND CLAVULANATE POTASSIUM 875; 125 MG/1; MG/1
1 TABLET, FILM COATED ORAL 2 TIMES DAILY
Qty: 14 TABLET | Refills: 0 | Status: SHIPPED | OUTPATIENT
Start: 2024-07-17

## 2024-07-17 RX ORDER — ACETAMINOPHEN 325 MG/1
TABLET ORAL
Status: DISCONTINUED
Start: 2024-07-17 | End: 2024-07-17 | Stop reason: HOSPADM

## 2024-07-17 RX ORDER — ACETAMINOPHEN 325 MG/1
650 TABLET ORAL
Status: COMPLETED | OUTPATIENT
Start: 2024-07-17 | End: 2024-07-17

## 2024-07-17 RX ORDER — AMOXICILLIN AND CLAVULANATE POTASSIUM 875; 125 MG/1; MG/1
TABLET, FILM COATED ORAL
Status: DISCONTINUED
Start: 2024-07-17 | End: 2024-07-17 | Stop reason: HOSPADM

## 2024-07-17 RX ADMIN — AMOXICILLIN AND CLAVULANATE POTASSIUM 1 TABLET: 875; 125 TABLET, FILM COATED ORAL at 09:07

## 2024-07-17 RX ADMIN — ACETAMINOPHEN 650 MG: 325 TABLET ORAL at 09:07

## 2024-07-18 NOTE — ED PROVIDER NOTES
Encounter Date: 7/17/2024       History     Chief Complaint   Patient presents with    Otalgia     Right sided ear pain, starting today.      Sore Throat     Layla Siegel is a 48 y.o. male presenting for evaluation of right-sided ear pain, extending into his throat and right-sided face.  No fever, no chills.  No recent swimming.  No known sick contacts.  He has not taken any medication for his symptoms.  He denies any dental pain. He has a past medical history of Benign paroxysmal vertigo, bilateral, High cholesterol, and Hypertension.      The history is provided by the patient.     Review of patient's allergies indicates:   Allergen Reactions    Pravastatin      Myalgia with Elevated CK     Past Medical History:   Diagnosis Date    Benign paroxysmal vertigo, bilateral     High cholesterol     Hypertension      Past Surgical History:   Procedure Laterality Date    CHOLECYSTECTOMY  08/2017    COLONOSCOPY N/A 12/15/2015    Procedure: COLONOSCOPY;  Surgeon: Horace Bella MD;  Location: [facility];  Service: Endoscopy;  Laterality: N/A;     Family History   Problem Relation Name Age of Onset    Hypertension Mother      Transient ischemic attack Mother      Heart disease Maternal Grandfather      Heart disease Paternal Grandfather      Coronary artery disease Father          s/p cabg    Hypertension Brother       Social History     Tobacco Use    Smoking status: Never    Smokeless tobacco: Never   Substance Use Topics    Alcohol use: Yes     Comment: occasional, once a month 1-2 mixed vodka drink    Drug use: No     Review of Systems   Constitutional:  Negative for activity change, appetite change, fatigue and fever.   HENT:  Positive for ear pain and sore throat. Negative for congestion and rhinorrhea.    Eyes:  Negative for visual disturbance.   Respiratory:  Negative for cough, chest tightness, shortness of breath and wheezing.    Cardiovascular:  Negative for chest pain and palpitations.   Gastrointestinal:   Negative for diarrhea, nausea and vomiting.   Musculoskeletal:  Negative for arthralgias and myalgias.   Skin:  Negative for rash.   Neurological:  Negative for weakness, light-headedness and headaches.       Physical Exam     Initial Vitals [07/17/24 1904]   BP Pulse Resp Temp SpO2   (!) 160/82 66 18 98.1 °F (36.7 °C) 98 %      MAP       --         Physical Exam    Nursing note and vitals reviewed.  Constitutional: He appears well-developed and well-nourished. He is not diaphoretic. No distress.   HENT:   Head: Normocephalic and atraumatic.   Right Ear: External ear normal. Tympanic membrane is erythematous and bulging.   Left Ear: Tympanic membrane and external ear normal.   Nose: Nose normal.   Mouth/Throat: Uvula is midline, oropharynx is clear and moist and mucous membranes are normal. Normal dentition. No dental caries.   Mild erythema and bulging noted to right TM.  No perforation.  No swelling or exudate noted to right ear canal. Mild erythema noted to posterior oropharynx without edema or exudate. No trismus, pooling of secretions or abnormal phonation.  Uvula midline.      Eyes: Conjunctivae are normal.   Neck: Neck supple.   Normal range of motion.  Cardiovascular:  Normal rate, regular rhythm, normal heart sounds and intact distal pulses.           Pulmonary/Chest: Breath sounds normal. No respiratory distress. He has no wheezes. He has no rhonchi. He has no rales.   Musculoskeletal:         General: No tenderness or edema. Normal range of motion.      Cervical back: Normal range of motion and neck supple.     Neurological: He is alert and oriented to person, place, and time. He has normal strength. No sensory deficit.   Skin: Skin is warm and dry. No rash and no abscess noted. No erythema.         ED Course   Procedures  Labs Reviewed - No data to display       Imaging Results    None          Medications   amoxicillin-clavulanate 875-125mg per tablet 1 tablet (has no administration in time range)    ibuprofen tablet 600 mg (has no administration in time range)     Medical Decision Making  Differential Diagnosis:   Otitis Media   Strep pharyngitis  Dental Pain   Otitis Externa   Viral syndrome    Pt emergently evaluated here in the ED.    Symptoms consistent with right otitis media.  We will treat with oral Augmentin and discharged home with a prescription for the same.  He is encouraged to follow up with his primary care provider for re-evaluation and further management is needed.  He voices understanding and is agreeable with the plan.  He is given specific return precautions. No evidence for peritonsillar abscess on exam.      Risk  OTC drugs.  Prescription drug management.               ED Course as of 07/17/24 2047 Wed Jul 17, 2024 2027 Attending Note:    I discussed the patient's care with Advanced Practice Clinician.  I reviewed their note and agree with the history, physical, assessment, diagnosis, treatment, all procedures performed, xray and EKG interpretations and discharge plan provided by the Advanced Practice Clinician. My overall impression is right otitis media./ will dc with augmentin.  The patient has been instructed to follow up with their physician or the one provided as well as specific return precautions.   Any Hoff M.D. 7/17/2024 8:27 PM     [RM]      ED Course User Index  [RM] Any Hoff MD                           Clinical Impression:  Final diagnoses:  [H66.001] Acute suppurative otitis media of right ear without spontaneous rupture of tympanic membrane, recurrence not specified (Primary)  [H92.01] Acute otalgia, right          ED Disposition Condition    Discharge Stable          ED Prescriptions       Medication Sig Dispense Start Date End Date Auth. Provider    amoxicillin-clavulanate 875-125mg (AUGMENTIN) 875-125 mg per tablet Take 1 tablet by mouth 2 (two) times daily. 14 tablet 7/17/2024 -- Flower Peters PA-C          Follow-up Information        Follow up With Specialties Details Why Contact Info Additional Information    [facility] Emergency Medicine  As needed, If symptoms worsen [address] [place] [address] [address] [place] [id number] 1st floor    Anival Rose MD Internal Medicine, Wound Care  As needed [address] [facility] [address]  [phone]                Flower Peters, PA-C  07/17/24 2040

## 2024-07-18 NOTE — FIRST PROVIDER EVALUATION
Emergency Department TeleTriage Encounter Note      CHIEF COMPLAINT    Chief Complaint   Patient presents with    Otalgia     Right sided ear pain, starting today.      Sore Throat       VITAL SIGNS   Initial Vitals [07/17/24 1904]   BP Pulse Resp Temp SpO2   (!) 160/82 66 18 98.1 °F (36.7 °C) 98 %      MAP       --            ALLERGIES    Review of patient's allergies indicates:   Allergen Reactions    Pravastatin      Myalgia with Elevated CK       PROVIDER TRIAGE NOTE  Patient presents with pain in the right ear/jaw/neck. Pain worse with opening and closing jaw. No injuries. No fever or chills. No sore throat.       ORDERS  Labs Reviewed - No data to display    ED Orders (720h ago, onward)      None              Virtual Visit Note: The provider triage portion of this emergency department evaluation and documentation was performed via [employer], a HIPAA-compliant telemedicine application, in concert with a tele-presenter in the room. A face to face patient evaluation with one of my colleagues will occur once the patient is placed in an emergency department room.      DISCLAIMER: This note was prepared with M*[employer] voice recognition transcription software. Garbled syntax, mangled pronouns, and other bizarre constructions may be attributed to that software system.

## 2024-09-06 ENCOUNTER — PATIENT OUTREACH (OUTPATIENT)
Dept: ADMINISTRATIVE | Facility: HOSPITAL | Age: 49
End: 2024-09-06
Payer: COMMERCIAL

## 2024-09-26 ENCOUNTER — PATIENT OUTREACH (OUTPATIENT)
Dept: ADMINISTRATIVE | Facility: HOSPITAL | Age: 49
End: 2024-09-26
Payer: COMMERCIAL

## 2024-10-22 ENCOUNTER — TELEPHONE (OUTPATIENT)
Dept: NEUROLOGY | Facility: CLINIC | Age: 49
End: 2024-10-22
Payer: COMMERCIAL

## 2024-10-22 NOTE — TELEPHONE ENCOUNTER
I spoke with Kayla at 8:30 am from the call center she stated that she had the patient on the line, he was running late for his 8am appt. I informed her that the patient had a 20 minute jaye period, but I will verify with the provider if she will still see the patient and I will give the patient a call. I spoke with Dr. Russ and she states that the patient will need to reschedule his appointment.

## 2024-11-11 ENCOUNTER — LAB VISIT (OUTPATIENT)
Dept: LAB | Facility: HOSPITAL | Age: 49
End: 2024-11-11
Payer: COMMERCIAL

## 2024-11-11 ENCOUNTER — TELEPHONE (OUTPATIENT)
Dept: SLEEP MEDICINE | Facility: OTHER | Age: 49
End: 2024-11-11
Payer: COMMERCIAL

## 2024-11-11 ENCOUNTER — OFFICE VISIT (OUTPATIENT)
Dept: NEUROLOGY | Facility: CLINIC | Age: 49
End: 2024-11-11
Payer: COMMERCIAL

## 2024-11-11 VITALS
HEART RATE: 75 BPM | WEIGHT: 224.19 LBS | BODY MASS INDEX: 29.71 KG/M2 | OXYGEN SATURATION: 100 % | DIASTOLIC BLOOD PRESSURE: 87 MMHG | SYSTOLIC BLOOD PRESSURE: 134 MMHG | HEIGHT: 73 IN

## 2024-11-11 DIAGNOSIS — R42 DIZZINESS AND GIDDINESS: ICD-10-CM

## 2024-11-11 PROCEDURE — 36415 COLL VENOUS BLD VENIPUNCTURE: CPT

## 2024-11-11 PROCEDURE — 1160F RVW MEDS BY RX/DR IN RCRD: CPT | Mod: CPTII,S$GLB,,

## 2024-11-11 PROCEDURE — 82652 VIT D 1 25-DIHYDROXY: CPT

## 2024-11-11 PROCEDURE — 1159F MED LIST DOCD IN RCRD: CPT | Mod: CPTII,S$GLB,,

## 2024-11-11 PROCEDURE — 3008F BODY MASS INDEX DOCD: CPT | Mod: CPTII,S$GLB,,

## 2024-11-11 PROCEDURE — 3079F DIAST BP 80-89 MM HG: CPT | Mod: CPTII,S$GLB,,

## 2024-11-11 PROCEDURE — 3044F HG A1C LEVEL LT 7.0%: CPT | Mod: CPTII,S$GLB,,

## 2024-11-11 PROCEDURE — 3075F SYST BP GE 130 - 139MM HG: CPT | Mod: CPTII,S$GLB,,

## 2024-11-11 PROCEDURE — 99999 PR PBB SHADOW E&M-EST. PATIENT-LVL IV: CPT | Mod: PBBFAC,,,

## 2024-11-11 PROCEDURE — 99215 OFFICE O/P EST HI 40 MIN: CPT | Mod: S$GLB,,,

## 2024-11-11 NOTE — PROGRESS NOTES
[facility] VISIT    Chief Complaint and Duration     Chief Complaint   Patient presents with    Dizziness    Hospital Follow Up    for 5 months ago.    History of Present Illness     Layla Siegel is a 49 y.o. right handed male with a history of multiple medical diagnoses as listed below that presents for dizziness.     Patient presents to clinic visit with spouse    Referral received from ED    Patient has a past medical history of lumbar spine disc disease, protruded cervical disc, hyperlipidemia, obesity, prediabetes, bariatric surgery    Patient reports an acute onset of dizziness 5 months ago requiring him to be seen in an emergency department.  It was thought that patient had a TIA, MRI brain CTA head and CTA head and neck imaging completed unremarkable incidental finding C3-C4 disc herniation with cord deformity and moderate to severe canal stenosis.  Patient denies any radiculopathy or myelopathy symptoms in regards to cervical spine finding.  Patient has been evaluated by Neurosurgery and have opted jointly and watchful waiting versus prophylactic ACDF patient will follow up as needed.  On today patient reports dizziness episode completely resolved and has only occurred once at that intensity stating initial episode was characterized by room spinning with mild nausea provoked by rolling over in bed.  Patient states he had multiple episodes throughout 1 day which prompted him to report to the emergency room.  him to go to the emergency room.  As of now patient reports intermittent episodes monthly of mild dizziness characterize as self spinning score 4 on 0-10 intensity with these less frequent episodes lasting seconds and self resolving with maneuvers.  These infrequent episodes are provoked by position changes such as bending over and stooped to stand.  Patient denies any nausea, lightheadedness, fever, headache, tinnitus, weakness, palpitation, slurred speech, numbness in  extremities or chest pain.  Patient reports he sits and dizziness episode passes he has not used any pharmacological or non pharmacological treatments.  Patient does have a chronic history of sinusitis and recent upper respiratory infections.  Uses Coricidin and Flonase for symptoms.  Timing of episodes can be anytime of the day.  Patient does have history of vitamin-D deficiency and CARMITA with occasional use of CPAP.    Review of patient's allergies indicates:   Allergen Reactions    Pravastatin      Myalgia with Elevated CK     Current Outpatient Medications   Medication Sig Dispense Refill    cyclobenzaprine (FLEXERIL) 10 MG tablet Take 1 tablet (10 mg total) by mouth 3 (three) times daily as needed for Muscle spasms (muscle tightness). 30 tablet 1    fluticasone propionate (FLONASE) 50 mcg/actuation nasal spray 1 spray (50 mcg total) by Each Nostril route 2 (two) times daily. (Patient taking differently: 1 spray by Each Nostril route 2 (two) times daily as needed for Rhinitis.) 16 g 1    gabapentin (NEURONTIN) 300 MG capsule Take 1 capsule (300 mg total) by mouth every evening. (Patient not taking: Reported on 11/11/2024) 30 capsule 2     No current facility-administered medications for this visit.       Medical History     Past Medical History:   Diagnosis Date    Benign paroxysmal vertigo, bilateral     High cholesterol     Hypertension      Past Surgical History:   Procedure Laterality Date    CHOLECYSTECTOMY  08/2017    COLONOSCOPY N/A 12/15/2015    Procedure: COLONOSCOPY;  Surgeon: Horace Bella MD;  Location: [facility];  Service: Endoscopy;  Laterality: N/A;     Family History   Problem Relation Name Age of Onset    Hypertension Mother      Transient ischemic attack Mother      Heart disease Maternal Grandfather      Heart disease Paternal Grandfather      Coronary artery disease Father          s/p cabg    Hypertension Brother       Social History     Socioeconomic History    Marital status:     Tobacco Use    Smoking status: Never    Smokeless tobacco: Never   Substance and Sexual Activity    Alcohol use: Yes     Comment: occasional, once a month 1-2 mixed vodka drink    Drug use: No    Sexual activity: Yes     Partners: Female     Birth control/protection: None       Exam     Vitals:    11/11/24 1320   BP: 134/87   Pulse: 75      Physical Exam:  General: Not in acute distress. Not ill-appearing.   HENT: Normocephalic and atraumatic. Moist mucous membranes.  Eyes: Conjunctivae normal.   Pulmonary: Pulmonary effort is normal.   Abdominal: Abdomen is soft and flat.   Skin: Skin is warm and dry. No rashes.   Psychiatric: Mood normal.        Neurologic Exam   Mental status: oriented to person, place, and time  Attention: Normal. Concentration: normal.  Speech: speech is normal.  Cranial Nerves: PERRL, EOMI intact, V1-V3 Facial sensation intact. Symmetric facies. Hearing grossly intact. Palate and uvula midline, symmetric. No tongue deviation. Trapezius strength intact.     Motor exam: bulk and tone normal. Strength 5/5 in bilateral upper extremities: deltoids, biceps, triceps, wrist flexion/extension, finger abduction/adduction. Strength 5/5 in bilateral lower extremities: hip flexion/extension, thigh adduction/abduction, knee flexion/extension, dorsiflexion/plantarflexion, foot eversion/inversion.    Reflexes: 2+ in bilateral upper extremities: biceps and brachiaradialis, 2+ in bilateral lower extremities: patellar and achilles  Plantar reflex: normal  Martinez's/Clonus: negative    Sensory exam: light touch intact    Gait exam: normal  Romberg: negative  Coordination: normal    Tremor: none  Cogwheel rigidity: none    Labs and Imaging     Labs: reviewed  No results found for this or any previous visit (from the past 24 hours).    Thyroid normal  HgA1C%:  5.6  LDL:  67.8  Vit B12: 873  Folate:  12.3  MMA:  WNL   Vit D:   29    Imaging:   I have personally reviewed the images performed.     HEAD CT:   4/28/24  Impression:  No acute intracranial process.  Additional evaluation with MRI of the brain, as clinically warranted.    MRI brain without contrast 04/29/2024  Impression:  No acute abnormality.    CTA head and neck 04/29/2024  Impression:  No acute intracranial abnormality.  No high-grade stenosis or major vessel occlusion.  Duplicated left cervical vertebral artery, as above.  This is a normal variant.     C3-4 central disc protrusion with superior extension behind the C3 body producing spinal canal stenosis.  Clinical correlation should be made with any history of cervical spine symptoms.    MRI cervical spine W W/O contrast 4/30/24  Impression:  Posterior central, cranially extending disc extrusion at C3-C4 contributes to severe spinal canal stenosis with associated cord compression.     Other procedures: reviewed    Assessment and Plan     Problem List Items Addressed This Visit    None  Visit Diagnoses       Dizziness and giddiness        Relevant Orders    Ambulatory referral/consult to ENT    Calcitriol          Mr. Siegel is a 49-year-old male new to me who presents to clinic for evaluation and recommendations in regards to dizziness episode.  Patient reports an acute onset of dizziness 5 months ago requiring him to be seen in an emergency department.  It was thought that patient had a TIA, MRI brain CTA head and CTA head and neck imaging completed unremarkable incidental finding C3-C4 disc herniation with cord deformity and moderate to severe canal stenosis.  Patient denies any radiculopathy or myelopathy symptoms in regards to cervical spine finding.  Patient has been evaluated by Neurosurgery and have opted jointly and watchful waiting versus prophylactic ACDF patient will follow up as needed.  On today patient reports dizziness episode completely resolved and has only occurred once at that intensity stating initial episode was characterized by room spinning with mild nausea provoked by rolling  over in bed.  Patient states he had multiple episodes throughout 1 day which prompted him to report to the emergency room.  him to go to the emergency room.  As of now patient reports intermittent episodes monthly of mild dizziness characterize as self spinning score 4 on 0-10 intensity with these less frequent episodes lasting seconds and self resolving with maneuvers.  These infrequent episodes are provoked by position changes such as bending over and stooped to stand.  Patient denies any nausea, lightheadedness, fever, headache, tinnitus, weakness, palpitation, slurred speech, numbness in extremities or chest pain.  Patient reports he sits and dizziness episode passes he has not used any pharmacological or non pharmacological treatments.  Patient does have a chronic history of sinusitis and recent upper respiratory infections.  Uses Coricidin and Flonase for symptoms.  Timing of episodes can be anytime of the day.  Patient does have history of vitamin-D deficiency and CARMITA with occasional use of CPAP.  Physical examination on today unremarkable.  Patient has not followed up with ENT or sleep medicine as previously recommended.    Given patient's onset and resolution of symptoms, past medical history of chronic sinusitis recent upper respiratory tract infections current symptomatology, vitamin D deficiency, unremarkable physical examination and unremarkable central findings on MRI brain, CT head, CTA head and neck my plan is as follows.  Referral placed for patient to be seen by ENT.  Also contacted sleep medicine clinic to discuss patient's scheduling of home sleep study and follow up with [facility].  Very low suspicion for a central cause of patient's dizziness high suspicion for peripheral cause of patient's dizziness.  Discussed other causes of a peripheral form of dizziness that can come from a vestibular disorder such as an imbalance of the inner ear.  Also discussed non vestibular causes of  dizziness which can be linked to a wide array of problems such as blood flow irregularities and or other cardiac issues.  We will recheck patient's vitamin-D level.  For patient's cervical spine disc herniation Discussed with the patient in great detail signs and symptoms of radiculopathy and myelopathy that needs to be reported to Neurosurgery or activation of the EMS system or receiving care at the emergency department.     Questions and concerns were sought and answered to the patient's stated verbal satisfaction. The patient verbalizes understanding and agreement with the above stated treatment plan.      ELICIA Maldonado  [facility]  Department of Neurology- [facility]     [phone]    Follow-up: Follow up if symptoms worsen or fail to improve.    Time spent on this encounter: 67 minutes. This includes face to face time and non-face to face time preparing to see the patient (eg, review of tests), obtaining and/or reviewing separately obtained history, documenting clinical information in the electronic or other health record, independently interpreting results and communicating results to the patient/family/caregiver, or care coordinator.     This note was created by combination of typed  and M-Modal dictation. Transcription and phonetic errors may be present.  If there are any questions, please contact me.

## 2024-11-14 LAB — 1,25(OH)2D3 SERPL-MCNC: 90 PG/ML (ref 20–79)

## 2024-12-03 ENCOUNTER — OFFICE VISIT (OUTPATIENT)
Dept: OTOLARYNGOLOGY | Facility: CLINIC | Age: 49
End: 2024-12-03
Payer: COMMERCIAL

## 2024-12-03 ENCOUNTER — CLINICAL SUPPORT (OUTPATIENT)
Dept: AUDIOLOGY | Facility: CLINIC | Age: 49
End: 2024-12-03
Payer: COMMERCIAL

## 2024-12-03 VITALS
HEIGHT: 73 IN | WEIGHT: 224.19 LBS | SYSTOLIC BLOOD PRESSURE: 144 MMHG | BODY MASS INDEX: 29.71 KG/M2 | DIASTOLIC BLOOD PRESSURE: 88 MMHG

## 2024-12-03 DIAGNOSIS — Z01.10 HEARING WITHIN NORMAL LIMITS IN BOTH EARS: ICD-10-CM

## 2024-12-03 DIAGNOSIS — R42 DIZZINESS AND GIDDINESS: ICD-10-CM

## 2024-12-03 DIAGNOSIS — H93.293 ABNORMAL AUDITORY PERCEPTION OF BOTH EARS: Primary | ICD-10-CM

## 2024-12-03 DIAGNOSIS — J32.3 CHRONIC SPHENOIDAL SINUSITIS: Primary | ICD-10-CM

## 2024-12-03 PROCEDURE — 1159F MED LIST DOCD IN RCRD: CPT | Mod: CPTII,S$GLB,, | Performed by: NURSE PRACTITIONER

## 2024-12-03 PROCEDURE — 92557 COMPREHENSIVE HEARING TEST: CPT | Mod: S$GLB,,,

## 2024-12-03 PROCEDURE — 3044F HG A1C LEVEL LT 7.0%: CPT | Mod: CPTII,S$GLB,, | Performed by: NURSE PRACTITIONER

## 2024-12-03 PROCEDURE — 3077F SYST BP >= 140 MM HG: CPT | Mod: CPTII,S$GLB,, | Performed by: NURSE PRACTITIONER

## 2024-12-03 PROCEDURE — 3079F DIAST BP 80-89 MM HG: CPT | Mod: CPTII,S$GLB,, | Performed by: NURSE PRACTITIONER

## 2024-12-03 PROCEDURE — 3008F BODY MASS INDEX DOCD: CPT | Mod: CPTII,S$GLB,, | Performed by: NURSE PRACTITIONER

## 2024-12-03 PROCEDURE — 99204 OFFICE O/P NEW MOD 45 MIN: CPT | Mod: S$GLB,,, | Performed by: NURSE PRACTITIONER

## 2024-12-03 RX ORDER — FLUTICASONE PROPIONATE 50 MCG
2 SPRAY, SUSPENSION (ML) NASAL 2 TIMES DAILY
Qty: 18.2 ML | Refills: 3 | Status: SHIPPED | OUTPATIENT
Start: 2024-12-03

## 2024-12-03 RX ORDER — AZELASTINE 1 MG/ML
2 SPRAY, METERED NASAL 2 TIMES DAILY
Qty: 30 ML | Refills: 3 | Status: SHIPPED | OUTPATIENT
Start: 2024-12-03

## 2024-12-03 NOTE — PATIENT INSTRUCTIONS
"Information and instructions from your visit with me today:    Start using the following medication nasal sprays:   Fluticasone spray:    This medication is a steroid spray. It stays within the nose and does not have absorption into the body that leads to side effects that one has with oral steroid medication. Fluticasone nasal spray is the same as the Flonase brand nasal spray. Discuss with your pharmacist if the price is lower over the counter or with a prescription ( this varies depending on insurance). The medication that is over the counter is the same as the prescription medication. Use this medication as instructed on the prescription, 1-2 sprays on each side of your nose twice daily.     Azelastine  spray:  This medication is an antihistamine used to treat nasal symptoms of allergy, which works specifically in the nose unlike antihistamine pills which have more of an effect on the whole body. Use this medication as instructed on the prescription, 1 spray on each side of your nose twice daily.     Additional instructions for medication sprays  Place the tip of the medication bottle in your nose and aim slightly up and out on each side to get medication high and deep into your nose and sinuses, and not have it all deposit in the very front of your nose. Aim the tip of the nozzle towards the outer corner of your eye . You can imagine aiming towards the back of your eyeball on each side for this, as opposed to straight back to the center of your nose and head.     You need to use this medication every day regardless of symptoms, as it takes time ( a few weeks) to work and get the benefits. It does not work on an "as needed" basis like taking a decongestant. If your symptoms only occur in a particular season, then the medication can be used seasonally instead of year long. For seasonal symptoms, you should start using the spray twice daily a month before when you normally have symptoms ( for example, if symptoms " start in August, should start at the end of June).     NASAL SALINE SPRAY ( simply saline and arm and hammer are examples) There are several different brands found in the cold and flu aisle of the pharmacy. You can use any brand of saline spray - this will deliver the saline by a gentle mist ( if you have difficulty or discomfort with nasal rinse/ a lot of fluid in the nose, this will be more comfortable).       Always rinse your nose with saline prior to using medication sprays and wait a couple of hours before using again. You can use the saline throughout the day to help with stuffy nose or dry nose.    Do not use nasal decongestant sprays such as Afrin or similar products long term ( over 3 days) .  This can cause long term physical nasal addiction. Afrin should only be used if having nose bleeds, severe nasal congestion , or severe ear pain/fullness and should not be used for more than 2-3 days in a row . It is a not a medication that should be used for a long period of time.     It was nice meeting you today, and I look forward to helping you feel better soon. Please don't hesitate to call if you have any other questions or concerns, or if I can be of any assistance in the meantime.          ALEXANDER De La Cruz, FNP-C  Otorhinolaryngology

## 2024-12-03 NOTE — PROGRESS NOTES
OTOLARYNGOLOGY CLINIC NOTE  Date:  12/03/2024     Chief complaint:  Chief Complaint   Patient presents with    Dizziness     Only had one episode     History of Present Illness  Layla Siegel is a 49 y.o. male  presenting today for a new evaluation and treatment of dizziness.  Pt reports having one episode of room spinning dizziness which lead him to going to ER.  Pt reports since then he has not had any more events.  Pt reports having sinus congestion, post nasal drip and rhinitis that frequently come and go.  Pt reports when he has these symptoms if he is moving to quickly he feels a little off centered but not dizzy.  Pt currently using Flonase during these times.  Pt not using any nasal rinses and doesn't use saline prior to Flonase.  Pt denies any otalgia, fullness, pressure or tinnitus.      Review of medical records and prior documentation  Past medical records were reviewed with data pertinent to the chief complaint summarized in the HPI. Information obtained from review of medical records is attributed to respective sources in the HPI with reference to sources of information at their mention. Records reviewed included all recent notes from referring provider, primary care, and related subspecialty evaluations as available. This review of records was performed and additional data obtained to supplement history obtained from the patient and further inform medical decision making involved in formulating a plan of care accounting for all history and treatment relevant to the issues addressed.    Past Medical History  Past Medical History:   Diagnosis Date    Benign paroxysmal vertigo, bilateral     High cholesterol     Hypertension       Past Surgical History  Past Surgical History:   Procedure Laterality Date    CHOLECYSTECTOMY  08/2017    COLONOSCOPY N/A 12/15/2015    Procedure: COLONOSCOPY;  Surgeon: Horace Bella MD;  Location: [facility];  Service: Endoscopy;  Laterality: N/A;      Medications  Current  "Outpatient Medications on File Prior to Visit   Medication Sig Dispense Refill    cyclobenzaprine (FLEXERIL) 10 MG tablet Take 1 tablet (10 mg total) by mouth 3 (three) times daily as needed for Muscle spasms (muscle tightness). 30 tablet 1    gabapentin (NEURONTIN) 300 MG capsule Take 1 capsule (300 mg total) by mouth every evening. 30 capsule 2    [DISCONTINUED] fluticasone propionate (FLONASE) 50 mcg/actuation nasal spray 1 spray (50 mcg total) by Each Nostril route 2 (two) times daily. (Patient not taking: Reported on 12/3/2024) 16 g 1     No current facility-administered medications on file prior to visit.     Review of Systems  Review of Systems   Constitutional: Negative.    HENT:  Positive for ear discharge.    Eyes:  Positive for photophobia.   Cardiovascular: Negative.    Gastrointestinal:  Positive for abdominal pain.   Genitourinary: Negative.    Musculoskeletal:  Positive for back pain.   Skin: Negative.    Neurological:  Positive for dizziness and seizures.   Psychiatric/Behavioral: Negative.        Social History   reports that he has never smoked. He has never used smokeless tobacco. He reports current alcohol use. He reports that he does not use drugs.     Family History  Family History   Problem Relation Name Age of Onset    Hypertension Mother      Transient ischemic attack Mother      Heart disease Maternal Grandfather      Heart disease Paternal Grandfather      Coronary artery disease Father          s/p cabg    Hypertension Brother        Physical Exam   Vitals:    12/03/24 1335   BP: (!) 144/88    Body mass index is 29.58 kg/m².  Weight: 101.7 kg (224 lb 3.3 oz)   Height: 6' 1" (185.4 cm)     GENERAL: no acute distress.  HEAD: normocephalic.   EYES: lids and lashes normal. No scleral icterus  EARS: external ear without lesion, normal pinna shape and position.  External auditory canal with normal cerumen, tympanic membrane fully visible, no perforation , no retraction. No middle ear effusion.  "   NOSE: external nose without significant bony abnormality  ORAL CAVITY/OROPHARYNX: tongue midline and mobile. Symmetric palate rise.    NECK: trachea midline.   LYMPH NODES:No cervical lymphadenopathy.  RESPIRATORY: no stridor, no stertor. Voice normal. Respirations nonlabored.  NEURO: alert, responds to questions appropriately.   Cranial nerve exam as indicated in above sections and additionally showed facial movement symmetric with good eye closure and symmetric smile.   PSYCH:mood appropriate    Imaging:  The patient does not have any pertinent and/or recent imaging of the head and neck.     Labs:  CBC  Recent Labs   Lab 01/30/24  1006 04/28/24  1628 04/28/24  1629 06/21/24  1518   WBC 2.70 L  --  3.17 L 4.26   Hemoglobin 12.5 L  --  12.0 L 12.8 L   POC Hematocrit  --  35 L  --   --    Hematocrit 37.3 L  --  36.3 L 37.6 L   MCV 89  --  92 90   Platelets 174  --  165 175     BMP  Recent Labs   Lab 01/30/24  1006 04/28/24  1629 06/21/24  1156   Glucose 97 100 91   Sodium 141 142 144   Potassium 3.9 4.6 5.2 H   Chloride 106 107 107   CO2 30 H 26 28   BUN 16 22 H 16   Creatinine 0.9 0.9 1.0   Calcium 8.9 9.3 9.5     COAGS  Recent Labs   Lab 04/28/24  1629   INR 1.0       AUDIOLOGY RESULTS  Audiometric evaluation including audiogram, tympanometry, acoustic reflexes, and speech discrimination which was performed  was personally reviewed and interpreted.  Notable findings on the audiogram were normal hearing sensitivity.  Speech discrimination was 100% on the left, and 96% on the right.  Report of the audiologist performing this audiometric testing was also reviewed.     Assessment  1. Chronic sphenoidal sinusitis  - fluticasone propionate (FLONASE) 50 mcg/actuation nasal spray; 2 sprays (100 mcg total) by Each Nostril route 2 (two) times daily.  Dispense: 18.2 mL; Refill: 3  - azelastine (ASTELIN) 137 mcg (0.1 %) nasal spray; 2 sprays (274 mcg total) by Nasal route 2 (two) times daily.  Dispense: 30 mL; Refill:  3    2. Dizziness and giddiness  - Ambulatory referral/consult to ENT     Plan:  Chronic Sinusitis:   discussed about pathophysiology of allergic disease and sinus disease. We discussed about treatment options for this including but not limited to medications and potential surgeries as well as when surgery is indicated.  - I recommend dual nasal spray therapy as combo of steroid and antihistamine nasal spray has been shown in evidence-based studies to be better than either alone.   -Discussed medication administration technique (point toward outer corner of eye and not towards nasal septum) and use nasal saline prior to medication sprays.   -We discussed importance of saline use prior to medication sprays to help sprays work better and to clean the nose.   -Counseled on risk of bleeding, risk of increased intraocular pressure/cataract with long term use.   -Discussed how to increase and decrease nasal spray regimen based on symptoms and that sprays can be used on prn basis but work best when used daily and take about 2-3 weeks to get to max level. If patient using sprays on a prn basis and symptoms not controlled should go back to daily /bid use  -discussed as well as gave patient physical documentation with photos about the saline and other medication sprays (paperwork summarized discussion topics).  F/u 2-3 months and prn.   Discussed plan of care with patient in detail and all questions answered. Patient reported understanding of plan of care.

## 2024-12-04 NOTE — PROGRESS NOTES
Please click on link to view Audiogram:  Document on 12/3/2024 1:32 PM by Aparna Valentine AU.D: Audiogram    Mr. Layla Siegel, a 49 y.o. male, was seen in the clinic today for an audiological evaluation.     Otoscopy clear bilaterally. Tympanometry testing was attempted bilaterally; hermetic seal could not be maintained to obtain results.     Audiological testing revealed normal hearing sensitivity bilaterally. A speech reception threshold was obtained at 10 dBHL for the right ear and at 15 dBHL for the left ear. Speech discrimination was 96% for the right ear and 100% for the left ear.      Recommendations:  1. Otologic evaluation  2. Repeat audiological evaluation if hearing changes  3. Hearing protection when in noise

## 2025-05-30 ENCOUNTER — TELEPHONE (OUTPATIENT)
Dept: FAMILY MEDICINE | Facility: CLINIC | Age: 50
End: 2025-05-30
Payer: COMMERCIAL

## 2025-05-30 NOTE — TELEPHONE ENCOUNTER
"----- Message from Trena sent at 5/30/2025  9:23 AM CDT -----  Regarding: Orders  "Type:  Patient Call BackWho Called:PTWhat is the reqeust in detail:Pt is scheduled today and would like orders put in for his blood work. Please adviseCan the clinic reply by [facility]?Best Call Back Number:[phone] Information:  "

## 2025-05-30 NOTE — TELEPHONE ENCOUNTER
[facility] and explained that Anival Rose MD is in clinic seeing other patients, therefore discuss the need for labs at visit today. Patient verbalized understanding.

## 2025-05-30 NOTE — TELEPHONE ENCOUNTER
"----- Message from Uriah Gregg NP sent at 5/30/2025 10:26 AM CDT -----  Regarding: RE: Orders  I will need to see him for orders.  Given such short notice His labs will not come back prior to the visit anyway.  Thank you  ----- Message -----  From: Melita Cordero RN  Sent: 5/30/2025  10:01 AM CDT  To: Uriah Gregg NP  Subject: FW: Orders                                       Labs prior to visit?  ----- Message -----  From: Trena Blancas  Sent: 5/30/2025   9:25 AM CDT  To: Milton Florian Staff  Subject: Orders                                           "Type:  Patient Call BackWho Called:PTWhat is the reqeust in detail:Pt is scheduled today and would like orders put in for his blood work. Please adviseCan the clinic reply by [facility]?Best Call Back Number:[phone] Information:  "

## 2025-06-05 ENCOUNTER — OFFICE VISIT (OUTPATIENT)
Dept: FAMILY MEDICINE | Facility: CLINIC | Age: 50
End: 2025-06-05
Payer: COMMERCIAL

## 2025-06-05 VITALS
DIASTOLIC BLOOD PRESSURE: 94 MMHG | RESPIRATION RATE: 18 BRPM | WEIGHT: 226.63 LBS | HEART RATE: 60 BPM | HEIGHT: 73 IN | BODY MASS INDEX: 30.04 KG/M2 | SYSTOLIC BLOOD PRESSURE: 134 MMHG | OXYGEN SATURATION: 98 % | TEMPERATURE: 98 F

## 2025-06-05 DIAGNOSIS — Z98.84 S/P GASTRIC BYPASS: ICD-10-CM

## 2025-06-05 DIAGNOSIS — J30.9 ALLERGIC RHINITIS, UNSPECIFIED SEASONALITY, UNSPECIFIED TRIGGER: ICD-10-CM

## 2025-06-05 DIAGNOSIS — R07.9 CHEST PAIN, UNSPECIFIED TYPE: ICD-10-CM

## 2025-06-05 DIAGNOSIS — R03.0 ELEVATED BLOOD PRESSURE READING: Primary | ICD-10-CM

## 2025-06-05 DIAGNOSIS — Z13.220 SCREENING FOR LIPID DISORDERS: ICD-10-CM

## 2025-06-05 DIAGNOSIS — Z12.5 SCREENING FOR MALIGNANT NEOPLASM OF PROSTATE: ICD-10-CM

## 2025-06-05 LAB
OHS QRS DURATION: 102 MS
OHS QTC CALCULATION: 426 MS

## 2025-06-05 PROCEDURE — 93010 ELECTROCARDIOGRAM REPORT: CPT | Mod: S$GLB,,, | Performed by: INTERNAL MEDICINE

## 2025-06-05 PROCEDURE — 99999 PR PBB SHADOW E&M-EST. PATIENT-LVL V: CPT | Mod: PBBFAC,,, | Performed by: NURSE PRACTITIONER

## 2025-06-05 RX ORDER — AZELASTINE 1 MG/ML
2 SPRAY, METERED NASAL 2 TIMES DAILY
Qty: 30 ML | Refills: 3 | Status: SHIPPED | OUTPATIENT
Start: 2025-06-05

## 2025-06-05 RX ORDER — FLUTICASONE PROPIONATE 50 MCG
2 SPRAY, SUSPENSION (ML) NASAL 2 TIMES DAILY
Qty: 18.2 ML | Refills: 3 | Status: SHIPPED | OUTPATIENT
Start: 2025-06-05

## 2025-06-09 ENCOUNTER — LAB VISIT (OUTPATIENT)
Dept: LAB | Facility: HOSPITAL | Age: 50
End: 2025-06-09
Attending: NURSE PRACTITIONER
Payer: COMMERCIAL

## 2025-06-09 DIAGNOSIS — Z12.5 SCREENING FOR MALIGNANT NEOPLASM OF PROSTATE: ICD-10-CM

## 2025-06-09 DIAGNOSIS — Z13.220 SCREENING FOR LIPID DISORDERS: ICD-10-CM

## 2025-06-09 DIAGNOSIS — Z98.84 S/P GASTRIC BYPASS: ICD-10-CM

## 2025-06-09 LAB
ABSOLUTE EOSINOPHIL (OHS): 0.04 K/UL
ABSOLUTE MONOCYTE (OHS): 0.33 K/UL (ref 0.3–1)
ABSOLUTE NEUTROPHIL COUNT (OHS): 0.92 K/UL (ref 1.8–7.7)
ALBUMIN SERPL BCP-MCNC: 3.9 G/DL (ref 3.5–5.2)
ALP SERPL-CCNC: 104 UNIT/L (ref 40–150)
ALT SERPL W/O P-5'-P-CCNC: 27 UNIT/L (ref 10–44)
ANION GAP (OHS): 9 MMOL/L (ref 8–16)
AST SERPL-CCNC: 20 UNIT/L (ref 11–45)
BASOPHILS # BLD AUTO: 0.03 K/UL
BASOPHILS NFR BLD AUTO: 1.1 %
BILIRUB SERPL-MCNC: 0.6 MG/DL (ref 0.1–1)
BUN SERPL-MCNC: 19 MG/DL (ref 6–20)
CALCIUM SERPL-MCNC: 9.1 MG/DL (ref 8.7–10.5)
CHLORIDE SERPL-SCNC: 105 MMOL/L (ref 95–110)
CHOLEST SERPL-MCNC: 180 MG/DL (ref 120–199)
CHOLEST/HDLC SERPL: 2.4 {RATIO} (ref 2–5)
CO2 SERPL-SCNC: 27 MMOL/L (ref 23–29)
CREAT SERPL-MCNC: 0.9 MG/DL (ref 0.5–1.4)
ERYTHROCYTE [DISTWIDTH] IN BLOOD BY AUTOMATED COUNT: 12.8 % (ref 11.5–14.5)
FERRITIN SERPL-MCNC: 223.1 NG/ML (ref 20–300)
GFR SERPLBLD CREATININE-BSD FMLA CKD-EPI: >60 ML/MIN/1.73/M2
GLUCOSE SERPL-MCNC: 87 MG/DL (ref 70–110)
HCT VFR BLD AUTO: 41.5 % (ref 40–54)
HDLC SERPL-MCNC: 76 MG/DL (ref 40–75)
HDLC SERPL: 42.2 % (ref 20–50)
HGB BLD-MCNC: 13.6 GM/DL (ref 14–18)
IMM GRANULOCYTES # BLD AUTO: 0.01 K/UL (ref 0–0.04)
IMM GRANULOCYTES NFR BLD AUTO: 0.4 % (ref 0–0.5)
IRON SATN MFR SERPL: 27 % (ref 20–50)
IRON SERPL-MCNC: 94 UG/DL (ref 45–160)
LDLC SERPL CALC-MCNC: 85 MG/DL (ref 63–159)
LYMPHOCYTES # BLD AUTO: 1.49 K/UL (ref 1–4.8)
MCH RBC QN AUTO: 30.1 PG (ref 27–31)
MCHC RBC AUTO-ENTMCNC: 32.8 G/DL (ref 32–36)
MCV RBC AUTO: 92 FL (ref 82–98)
NONHDLC SERPL-MCNC: 104 MG/DL
NUCLEATED RBC (/100WBC) (OHS): 0 /100 WBC
PLATELET # BLD AUTO: 183 K/UL (ref 150–450)
PMV BLD AUTO: 10 FL (ref 9.2–12.9)
POTASSIUM SERPL-SCNC: 4.1 MMOL/L (ref 3.5–5.1)
PROT SERPL-MCNC: 7.4 GM/DL (ref 6–8.4)
RBC # BLD AUTO: 4.52 M/UL (ref 4.6–6.2)
RELATIVE EOSINOPHIL (OHS): 1.4 %
RELATIVE LYMPHOCYTE (OHS): 52.8 % (ref 18–48)
RELATIVE MONOCYTE (OHS): 11.7 % (ref 4–15)
RELATIVE NEUTROPHIL (OHS): 32.6 % (ref 38–73)
SODIUM SERPL-SCNC: 141 MMOL/L (ref 136–145)
TIBC SERPL-MCNC: 343 UG/DL (ref 250–450)
TRANSFERRIN SERPL-MCNC: 232 MG/DL (ref 200–375)
TRIGL SERPL-MCNC: 95 MG/DL (ref 30–150)
TSH SERPL-ACNC: 1.15 UIU/ML (ref 0.4–4)
WBC # BLD AUTO: 2.82 K/UL (ref 3.9–12.7)

## 2025-06-09 PROCEDURE — 36415 COLL VENOUS BLD VENIPUNCTURE: CPT | Mod: PN

## 2025-06-09 PROCEDURE — 84590 ASSAY OF VITAMIN A: CPT

## 2025-06-09 PROCEDURE — 82310 ASSAY OF CALCIUM: CPT

## 2025-06-09 PROCEDURE — 82728 ASSAY OF FERRITIN: CPT

## 2025-06-09 PROCEDURE — 82607 VITAMIN B-12: CPT

## 2025-06-09 PROCEDURE — 84466 ASSAY OF TRANSFERRIN: CPT

## 2025-06-09 PROCEDURE — 85025 COMPLETE CBC W/AUTO DIFF WBC: CPT

## 2025-06-09 PROCEDURE — 83036 HEMOGLOBIN GLYCOSYLATED A1C: CPT

## 2025-06-09 PROCEDURE — 84153 ASSAY OF PSA TOTAL: CPT

## 2025-06-09 PROCEDURE — 82746 ASSAY OF FOLIC ACID SERUM: CPT

## 2025-06-09 PROCEDURE — 82306 VITAMIN D 25 HYDROXY: CPT

## 2025-06-09 PROCEDURE — 84207 ASSAY OF VITAMIN B-6: CPT

## 2025-06-09 PROCEDURE — 84443 ASSAY THYROID STIM HORMONE: CPT

## 2025-06-09 PROCEDURE — 84425 ASSAY OF VITAMIN B-1: CPT

## 2025-06-09 PROCEDURE — 82465 ASSAY BLD/SERUM CHOLESTEROL: CPT

## 2025-06-10 LAB
25(OH)D3+25(OH)D2 SERPL-MCNC: 31 NG/ML (ref 30–96)
EAG (OHS): 117 MG/DL (ref 68–131)
FOLATE SERPL-MCNC: 7.3 NG/ML (ref 4–24)
HBA1C MFR BLD: 5.7 % (ref 4–5.6)
PSA SERPL-MCNC: 0.57 NG/ML
VIT B12 SERPL-MCNC: 1244 PG/ML (ref 210–950)

## 2025-06-13 ENCOUNTER — RESULTS FOLLOW-UP (OUTPATIENT)
Dept: FAMILY MEDICINE | Facility: CLINIC | Age: 50
End: 2025-06-13

## 2025-06-13 DIAGNOSIS — D72.818 OTHER DECREASED WHITE BLOOD CELL (WBC) COUNT: Primary | ICD-10-CM

## 2025-06-13 LAB
W VITAMIN A: 44 UG/DL
W VITAMIN B1: 53 UG/L
W VITAMIN B6: 10 UG/L

## 2025-06-17 ENCOUNTER — OFFICE VISIT (OUTPATIENT)
Dept: CARDIOLOGY | Facility: CLINIC | Age: 50
End: 2025-06-17
Payer: COMMERCIAL

## 2025-06-17 VITALS
SYSTOLIC BLOOD PRESSURE: 127 MMHG | HEIGHT: 73 IN | BODY MASS INDEX: 29.8 KG/M2 | WEIGHT: 224.88 LBS | HEART RATE: 62 BPM | DIASTOLIC BLOOD PRESSURE: 79 MMHG

## 2025-06-17 DIAGNOSIS — R07.9 CHEST PAIN, UNSPECIFIED TYPE: ICD-10-CM

## 2025-06-17 DIAGNOSIS — I10 HYPERTENSION, UNSPECIFIED TYPE: Primary | ICD-10-CM

## 2025-06-17 LAB
OHS QRS DURATION: 92 MS
OHS QTC CALCULATION: 428 MS

## 2025-06-17 PROCEDURE — 1159F MED LIST DOCD IN RCRD: CPT | Mod: CPTII,S$GLB,, | Performed by: INTERNAL MEDICINE

## 2025-06-17 PROCEDURE — 99204 OFFICE O/P NEW MOD 45 MIN: CPT | Mod: S$GLB,,, | Performed by: INTERNAL MEDICINE

## 2025-06-17 PROCEDURE — 99999 PR PBB SHADOW E&M-EST. PATIENT-LVL IV: CPT | Mod: PBBFAC,,, | Performed by: INTERNAL MEDICINE

## 2025-06-17 PROCEDURE — 3008F BODY MASS INDEX DOCD: CPT | Mod: CPTII,S$GLB,, | Performed by: INTERNAL MEDICINE

## 2025-06-17 PROCEDURE — 3074F SYST BP LT 130 MM HG: CPT | Mod: CPTII,S$GLB,, | Performed by: INTERNAL MEDICINE

## 2025-06-17 PROCEDURE — 3044F HG A1C LEVEL LT 7.0%: CPT | Mod: CPTII,S$GLB,, | Performed by: INTERNAL MEDICINE

## 2025-06-17 PROCEDURE — 3078F DIAST BP <80 MM HG: CPT | Mod: CPTII,S$GLB,, | Performed by: INTERNAL MEDICINE

## 2025-06-17 RX ORDER — LOSARTAN POTASSIUM AND HYDROCHLOROTHIAZIDE 12.5; 1 MG/1; MG/1
1 TABLET ORAL DAILY
COMMUNITY

## 2025-06-17 NOTE — PROGRESS NOTES
Subjective:    Patient ID:  Layla Siegel is a 49 y.o. male patient here for evaluation Chest Pain and Hyperlipidemia      History of Present Illness:     49-year-old male here for evaluation of chest pain episodes, referred from primary care.  Patient with a past medical history of hypertension, hyperlipidemia, gastric bypass.  Reports intermittent chest pain episodes which are sharp in nature, unrelated to exertion, random in onset and resolution, lasting up only 1-2 seconds.  Physically active at baseline and denies any exertional chest pain or dyspnea.  Patient reports abdominal bloating as well which could be contributing to chest pains..  Has family history of coronary artery disease.        Review of patient's allergies indicates:   Allergen Reactions    Pravastatin      Myalgia with Elevated CK       Past Medical History:   Diagnosis Date    Benign paroxysmal vertigo, bilateral     High cholesterol     Hypertension      Past Surgical History:   Procedure Laterality Date    CHOLECYSTECTOMY  08/2017    COLONOSCOPY N/A 12/15/2015    Procedure: COLONOSCOPY;  Surgeon: Horace Bella MD;  Location: [facility];  Service: Endoscopy;  Laterality: N/A;     Social History[1]     Review of Systems   Negative except as mentioned in HPI         Objective        Vitals:    06/17/25 1045   BP: 127/79   Pulse: 62       LIPIDS - LAST 2   Lab Results   Component Value Date    CHOL 180 06/09/2025    CHOL 159 04/28/2024    HDL 76 (H) 06/09/2025    HDL 69 04/28/2024    LDLCALC 85.0 06/09/2025    LDLCALC 67.8 04/28/2024    TRIG 95 06/09/2025    TRIG 111 04/28/2024    CHOLHDL 42.2 06/09/2025    CHOLHDL 43.4 04/28/2024       CBC - LAST 2  Lab Results   Component Value Date    WBC 2.82 (L) 06/09/2025    WBC 4.26 06/21/2024    RBC 4.52 (L) 06/09/2025    RBC 4.20 (L) 06/21/2024    HGB 13.6 (L) 06/09/2025    HGB 12.8 (L) 06/21/2024    HCT 41.5 06/09/2025    HCT 37.6 (L) 06/21/2024    MCV 92 06/09/2025    MCV 90 06/21/2024    MCH 30.1  06/09/2025    MCH 30.5 06/21/2024    MCHC 32.8 06/09/2025    MCHC 34.0 06/21/2024    RDW 12.8 06/09/2025    RDW 13.2 06/21/2024     06/09/2025     06/21/2024    MPV 10.0 06/09/2025    MPV 9.5 06/21/2024    GRAN 1.9 06/21/2024    GRAN 45.6 06/21/2024    LYMPH 52.8 (H) 06/09/2025    LYMPH 1.49 06/09/2025    MONO 11.7 06/09/2025    MONO 0.33 06/09/2025    BASO 0.03 06/21/2024    BASO 0.02 04/28/2024    NRBC 0 06/09/2025    NRBC 0 06/21/2024       CHEMISTRY & LIVER FUNCTION - LAST 2  Lab Results   Component Value Date     06/09/2025     06/21/2024    K 4.1 06/09/2025    K 5.2 (H) 06/21/2024     06/09/2025     06/21/2024    CO2 27 06/09/2025    CO2 28 06/21/2024    ANIONGAP 9 06/09/2025    ANIONGAP 9 06/21/2024    BUN 19 06/09/2025    BUN 16 06/21/2024    CREATININE 0.9 06/09/2025    CREATININE 1.0 06/21/2024    GLU 87 06/09/2025    GLU 91 06/21/2024    CALCIUM 9.1 06/09/2025    CALCIUM 9.5 06/21/2024    ALBUMIN 3.9 06/09/2025    ALBUMIN 4.1 06/21/2024    PROT 7.4 06/09/2025    PROT 7.2 06/21/2024    ALKPHOS 104 06/09/2025    ALKPHOS 103 06/21/2024    ALT 27 06/09/2025    ALT 69 (H) 06/21/2024    AST 20 06/09/2025    AST 36 06/21/2024    BILITOT 0.6 06/09/2025    BILITOT 0.4 06/21/2024        CARDIAC PROFILE - LAST 2  Lab Results   Component Value Date    BNP <10 08/27/2017    BNP 12 01/26/2017     (H) 11/11/2021     (H) 04/30/2019    TROPONINI <0.006 08/27/2017    TROPONINI 0.009 01/26/2017        COAGULATION - LAST 2  Lab Results   Component Value Date    INR 1.0 04/28/2024    INR 1.0 01/26/2017       ENDOCRINE & PSA - LAST 2  Lab Results   Component Value Date    HGBA1C 5.7 (H) 06/09/2025    HGBA1C 5.6 06/21/2024    TSH 1.150 06/09/2025    TSH 0.402 04/28/2024    PSA 0.57 06/09/2025        ECHOCARDIOGRAM RESULTS  No results found for this or any previous visit.      CURRENT/PREVIOUS VISIT EKG  Results for orders placed or performed in visit on 06/05/25   IN OFFICE  EKG 12-LEAD (to [employer])    Collection Time: 06/05/25 12:15 PM   Result Value Ref Range    QRS Duration 102 ms    OHS QTC Calculation 426 ms    Narrative    Test Reason : R07.9,    Vent. Rate :  56 BPM     Atrial Rate :  56 BPM     P-R Int : 182 ms          QRS Dur : 102 ms      QT Int : 442 ms       P-R-T Axes :  -7 -34 -37 degrees    QTcB Int : 426 ms    Sinus bradycardia  Left axis deviation  Moderate voltage criteria for LVH, may be normal variant  T wave abnormality, consider anterolateral ischemia  Abnormal ECG  When compared with ECG of 28-Apr-2024 15:33,  Inverted T waves have replaced nonspecific T wave abnormality in Anterior  leads  Confirmed by Latrice Marie ([id number]) on 6/5/2025 8:46:10 PM    Referred By: ANGEL           Confirmed By: Latrice Marie     No valid procedures specified.   No results found for this or any previous visit.    No valid procedures specified.          PREVIOUS STRESS TEST              PREVIOUS ANGIOGRAM        PHYSICAL EXAM    CONSTITUTIONAL: Well built, well nourished in no apparent distress  HEENT: No pallor  NECK: no JVD  LUNGS: CTA b/l  HEART: regular rate and rhythm, S1, S2 normal, no murmur   ABDOMEN:  Nondistended  EXTREMITIES: No edema  NEURO: AAO X 3   SKIN:  No rash  Psych:  Normal affect    I HAVE REVIEWED :    The vital signs, nurses notes, and all the pertinent recent radiology studies, cardiovascular studies and labs.  I have independently reviewed the patient's most recent EKG.        Current Outpatient Medications   Medication Instructions    azelastine (ASTELIN) 274 mcg, Nasal, 2 times daily    cyclobenzaprine (FLEXERIL) 10 mg, Oral, 3 times daily PRN    fluticasone propionate (FLONASE) 100 mcg, Each Nostril, 2 times daily    gabapentin (NEURONTIN) 300 mg, Oral, Nightly    losartan-hydrochlorothiazide 100-12.5 mg (HYZAAR) 100-12.5 mg Tab 1 tablet, Oral, Daily, Pt states taking prn        ECG reviewed by me:  Normal sinus rhythm, left axis deviation, borderline LVH,  anterolateral T-wave inversion  Assessment and Plan     Chest pain-atypical  Hypertension-controlled  Dyslipidemia-intolerance to statin recent LDL 85  History of gastric bypass  Family history of coronary artery disease      EKG reviewed.  Sinus rhythm, borderline LVH, anterolateral T-wave inversion noted.  Had nonspecific anterior T-wave changes in the past as well  However the chest pain is very atypical  Echo nuclear stress test for further evaluation   Advised to take aspirin 81 mg daily for now   Continue losartan-hydrochlorothiazide   Home BP monitoring   Advised patient to go to the ER in case of any worsening or change in symptoms.      Follow up in about 1 week (around 6/24/2025).     Hypertension, unspecified type    Chest pain, unspecified type  -     Ambulatory referral/consult to Cardiology  -     IN OFFICE EKG 12-LEAD (to [place])  -     Echo Saline Bubble? No; Ultrasound enhancing contrast? No; Future; Expected date: 06/18/2025  -     NM Myocardial Perfusion Spect Multi Exer; Future; Expected date: 06/18/2025  -     Nuclear Stress Test; Future; Expected date: 06/18/2025              [1]   Social History  Tobacco Use    Smoking status: Never    Smokeless tobacco: Never   Substance Use Topics    Alcohol use: Yes     Comment: occasional, once a month 1-2 mixed vodka drink    Drug use: No

## 2025-06-20 ENCOUNTER — TELEPHONE (OUTPATIENT)
Dept: CARDIOLOGY | Facility: HOSPITAL | Age: 50
End: 2025-06-20

## 2025-06-23 ENCOUNTER — HOSPITAL ENCOUNTER (OUTPATIENT)
Dept: RADIOLOGY | Facility: HOSPITAL | Age: 50
Discharge: HOME | End: 2025-06-23
Attending: INTERNAL MEDICINE
Payer: COMMERCIAL

## 2025-06-23 ENCOUNTER — CLINICAL SUPPORT (OUTPATIENT)
Dept: CARDIOLOGY | Facility: HOSPITAL | Age: 50
End: 2025-06-23
Attending: INTERNAL MEDICINE
Payer: COMMERCIAL

## 2025-06-23 DIAGNOSIS — R07.9 CHEST PAIN, UNSPECIFIED TYPE: ICD-10-CM

## 2025-06-23 LAB
AORTIC ROOT ANNULUS: 3.4 CM
AORTIC VALVE CUSP SEPERATION: 3.4 CM
APICAL FOUR CHAMBER EJECTION FRACTION: 38 %
APICAL TWO CHAMBER EJECTION FRACTION: 46 %
ASCENDING AORTA: 3.3 CM
AV INDEX (PROSTH): 0.82
AV MEAN GRADIENT: 3 MMHG
AV PEAK GRADIENT: 5 MMHG
AV VALVE AREA BY VELOCITY RATIO: 3 CM²
AV VALVE AREA: 3.4 CM²
AV VELOCITY RATIO: 0.73
CV ECHO LV RWT: 0.35 CM
CV STRESS BASE HR: 58 BPM
DIASTOLIC BLOOD PRESSURE: 97 MMHG
DOP CALC AO PEAK VEL: 1.1 M/S
DOP CALC AO VTI: 23 CM
DOP CALC LVOT AREA: 4.2 CM2
DOP CALC LVOT DIAMETER: 2.3 CM
DOP CALC LVOT PEAK VEL: 0.8 M/S
DOP CALC LVOT STROKE VOLUME: 78.1 CM3
DOP CALC MV VTI: 30.7 CM
DOP CALCLVOT PEAK VEL VTI: 18.8 CM
E WAVE DECELERATION TIME: 222 MSEC
E/A RATIO: 0.9
E/E' RATIO: 10 M/S
ECHO LV POSTERIOR WALL: 0.9 CM (ref 0.6–1.1)
FRACTIONAL SHORTENING: 23.5 % (ref 28–44)
INTERVENTRICULAR SEPTUM: 0.7 CM (ref 0.6–1.1)
IVC DIAMETER: 2.2 CM
LEFT ATRIUM AREA SYSTOLIC (APICAL 2 CHAMBER): 13.7 CM2
LEFT ATRIUM AREA SYSTOLIC (APICAL 4 CHAMBER): 18.7 CM2
LEFT ATRIUM SIZE: 4.3 CM
LEFT ATRIUM VOLUME MOD: 45 ML
LEFT INTERNAL DIMENSION IN SYSTOLE: 3.9 CM (ref 2.1–4)
LEFT VENTRICLE DIASTOLIC VOLUME: 124 ML
LEFT VENTRICLE END DIASTOLIC VOLUME APICAL 2 CHAMBER: 94.2 ML
LEFT VENTRICLE END DIASTOLIC VOLUME APICAL 4 CHAMBER: 136 ML
LEFT VENTRICLE END SYSTOLIC VOLUME APICAL 2 CHAMBER: 32.4 ML
LEFT VENTRICLE END SYSTOLIC VOLUME APICAL 4 CHAMBER: 61.3 ML
LEFT VENTRICLE SYSTOLIC VOLUME: 66 ML
LEFT VENTRICULAR INTERNAL DIMENSION IN DIASTOLE: 5.1 CM (ref 3.5–6)
LEFT VENTRICULAR MASS: 140.5 G
LV LATERAL E/E' RATIO: 8.2 M/S
LV SEPTAL E/E' RATIO: 12.3 M/S
LVED V (TEICH): 123.81 ML
LVES V (TEICH): 65.91 ML
LVOT MG: 1 MMHG
LVOT MV: 0.53 CM/S
MV MEAN GRADIENT: 2 MMHG
MV PEAK A VEL: 0.82 M/S
MV PEAK E VEL: 0.74 M/S
MV PEAK GRADIENT: 4 MMHG
MV STENOSIS PRESSURE HALF TIME: 111 MS
MV VALVE AREA BY CONTINUITY EQUATION: 2.54 CM2
MV VALVE AREA P 1/2 METHOD: 1.98 CM2
OHS CV CPX 1 MINUTE RECOVERY HEART RATE: 127 BPM
OHS CV CPX 85 PERCENT MAX PREDICTED HEART RATE MALE: 146
OHS CV CPX ESTIMATED METS: 11.5
OHS CV CPX MAX PREDICTED HEART RATE: 172
OHS CV CPX PATIENT IS FEMALE: 0
OHS CV CPX PATIENT IS MALE: 1
OHS CV CPX PEAK DIASTOLIC BLOOD PRESSURE: 120 MMHG
OHS CV CPX PEAK HEAR RATE: 155 BPM
OHS CV CPX PEAK RATE PRESSURE PRODUCT: NORMAL
OHS CV CPX PEAK SYSTOLIC BLOOD PRESSURE: 220 MMHG
OHS CV CPX PERCENT MAX PREDICTED HEART RATE ACHIEVED: 90
OHS CV CPX RATE PRESSURE PRODUCT PRESENTING: 8468
OHS CV RV/LV RATIO: 0.65 CM
OHS LV EJECTION FRACTION SIMPSONS BIPLANE MOD: 41 %
PV MV: 0.56 M/S
PV PEAK GRADIENT: 3 MMHG
PV PEAK VELOCITY: 0.84 M/S
RA PRESSURE ESTIMATED: 3 MMHG
RIGHT ATRIUM VOLUME AREA LENGTH APICAL 4 CHAMBER: 26.6 ML
RIGHT VENTRICLE DIASTOLIC BASEL DIMENSION: 3.3 CM
RIGHT VENTRICULAR END-DIASTOLIC DIMENSION: 3.3 CM
RV TISSUE DOPPLER FREE WALL SYSTOLIC VELOCITY 1 (APICAL 4 CHAMBER VIEW): 18.1 CM/S
STRESS ECHO POST EXERCISE DUR MIN: 9 MINUTES
STRESS ECHO POST EXERCISE DUR SEC: 45 SECONDS
SYSTOLIC BLOOD PRESSURE: 146 MMHG
TDI LATERAL: 0.09 M/S
TDI SEPTAL: 0.06 M/S
TDI: 0.08 M/S
TRICUSPID ANNULAR PLANE SYSTOLIC EXCURSION: 2.2 CM

## 2025-06-23 PROCEDURE — A9502 TC99M TETROFOSMIN: HCPCS | Performed by: INTERNAL MEDICINE

## 2025-06-23 PROCEDURE — 93306 TTE W/DOPPLER COMPLETE: CPT | Mod: 26,,, | Performed by: INTERNAL MEDICINE

## 2025-06-23 PROCEDURE — 93017 CV STRESS TEST TRACING ONLY: CPT

## 2025-06-23 PROCEDURE — 78452 HT MUSCLE IMAGE SPECT MULT: CPT | Mod: TC

## 2025-06-23 PROCEDURE — 93306 TTE W/DOPPLER COMPLETE: CPT

## 2025-06-23 PROCEDURE — 93016 CV STRESS TEST SUPVJ ONLY: CPT | Mod: ,,, | Performed by: INTERNAL MEDICINE

## 2025-06-23 PROCEDURE — 78452 HT MUSCLE IMAGE SPECT MULT: CPT | Mod: 26,,, | Performed by: RADIOLOGY

## 2025-06-23 PROCEDURE — 93018 CV STRESS TEST I&R ONLY: CPT | Mod: ,,, | Performed by: INTERNAL MEDICINE

## 2025-06-23 RX ADMIN — TETROFOSMIN 10.9 MILLICURIE: 0.23 INJECTION, POWDER, LYOPHILIZED, FOR SOLUTION INTRAVENOUS at 07:06

## 2025-06-23 RX ADMIN — TETROFOSMIN 26.9 MILLICURIE: 0.23 INJECTION, POWDER, LYOPHILIZED, FOR SOLUTION INTRAVENOUS at 09:06

## 2025-06-23 NOTE — NURSING NOTE
Nuclear Treadmill Stress Test completed without complications.ULISES Hall made aware of EKG changes and HTN response. Pt advised to monitor BP TID and notify MD of readings. Pt has a f/u scheduled. Patient verbalized understanding of pre-post test instructions. Discharged from stress lab per Cardiology

## 2025-06-26 ENCOUNTER — OFFICE VISIT (OUTPATIENT)
Dept: CARDIOLOGY | Facility: CLINIC | Age: 50
End: 2025-06-26
Payer: COMMERCIAL

## 2025-06-26 VITALS
HEART RATE: 60 BPM | SYSTOLIC BLOOD PRESSURE: 130 MMHG | DIASTOLIC BLOOD PRESSURE: 80 MMHG | BODY MASS INDEX: 29.51 KG/M2 | HEIGHT: 73 IN | WEIGHT: 222.69 LBS

## 2025-06-26 DIAGNOSIS — I10 HYPERTENSION, UNSPECIFIED TYPE: ICD-10-CM

## 2025-06-26 DIAGNOSIS — R07.9 CHEST PAIN, UNSPECIFIED TYPE: Primary | ICD-10-CM

## 2025-06-26 PROCEDURE — 3008F BODY MASS INDEX DOCD: CPT | Mod: CPTII,S$GLB,, | Performed by: INTERNAL MEDICINE

## 2025-06-26 PROCEDURE — 3075F SYST BP GE 130 - 139MM HG: CPT | Mod: CPTII,S$GLB,, | Performed by: INTERNAL MEDICINE

## 2025-06-26 PROCEDURE — 3079F DIAST BP 80-89 MM HG: CPT | Mod: CPTII,S$GLB,, | Performed by: INTERNAL MEDICINE

## 2025-06-26 PROCEDURE — 3044F HG A1C LEVEL LT 7.0%: CPT | Mod: CPTII,S$GLB,, | Performed by: INTERNAL MEDICINE

## 2025-06-26 PROCEDURE — 99214 OFFICE O/P EST MOD 30 MIN: CPT | Mod: S$GLB,,, | Performed by: INTERNAL MEDICINE

## 2025-06-26 PROCEDURE — 99999 PR PBB SHADOW E&M-EST. PATIENT-LVL III: CPT | Mod: PBBFAC,,, | Performed by: INTERNAL MEDICINE

## 2025-06-26 PROCEDURE — 1159F MED LIST DOCD IN RCRD: CPT | Mod: CPTII,S$GLB,, | Performed by: INTERNAL MEDICINE

## 2025-06-26 RX ORDER — LOSARTAN POTASSIUM AND HYDROCHLOROTHIAZIDE 12.5; 1 MG/1; MG/1
1 TABLET ORAL DAILY
Qty: 30 TABLET | Refills: 5 | Status: SHIPPED | OUTPATIENT
Start: 2025-06-26

## 2025-06-26 NOTE — PROGRESS NOTES
Subjective:    Patient ID:  Layla Siegel is a 49 y.o. male patient here for evaluation Hyperlipidemia and Results    Follow up visit 06/26/2025:   Came for follow up of test results.  Chest pain resolved.  Patient stated he was not taking losartan hydrochlorothiazide until recently.  Did not take the medication yesterday.    History of Present Illness initial clinic visit:     49-year-old male here for evaluation of chest pain episodes, referred from primary care.  Patient with a past medical history of hypertension, hyperlipidemia, gastric bypass.  Reports intermittent chest pain episodes which are sharp in nature, unrelated to exertion, random in onset and resolution, lasting up only 1-2 seconds.  Physically active at baseline and denies any exertional chest pain or dyspnea.  Patient reports abdominal bloating as well which could be contributing to chest pains..  Has family history of coronary artery disease.        Review of patient's allergies indicates:   Allergen Reactions    Pravastatin      Myalgia with Elevated CK       Past Medical History:   Diagnosis Date    Benign paroxysmal vertigo, bilateral     High cholesterol     Hypertension      Past Surgical History:   Procedure Laterality Date    CHOLECYSTECTOMY  08/2017    COLONOSCOPY N/A 12/15/2015    Procedure: COLONOSCOPY;  Surgeon: Horace Bella MD;  Location: [facility];  Service: Endoscopy;  Laterality: N/A;     Social History[1]     Review of Systems   Negative except as mentioned in HPI         Objective        Vitals:    06/26/25 1108   BP: 130/80   Pulse: 60       LIPIDS - LAST 2   Lab Results   Component Value Date    CHOL 180 06/09/2025    CHOL 159 04/28/2024    HDL 76 (H) 06/09/2025    HDL 69 04/28/2024    LDLCALC 85.0 06/09/2025    LDLCALC 67.8 04/28/2024    TRIG 95 06/09/2025    TRIG 111 04/28/2024    CHOLHDL 42.2 06/09/2025    CHOLHDL 43.4 04/28/2024       CBC - LAST 2  Lab Results   Component Value Date    WBC 2.82 (L) 06/09/2025    WBC  4.26 06/21/2024    RBC 4.52 (L) 06/09/2025    RBC 4.20 (L) 06/21/2024    HGB 13.6 (L) 06/09/2025    HGB 12.8 (L) 06/21/2024    HCT 41.5 06/09/2025    HCT 37.6 (L) 06/21/2024    MCV 92 06/09/2025    MCV 90 06/21/2024    MCH 30.1 06/09/2025    MCH 30.5 06/21/2024    MCHC 32.8 06/09/2025    MCHC 34.0 06/21/2024    RDW 12.8 06/09/2025    RDW 13.2 06/21/2024     06/09/2025     06/21/2024    MPV 10.0 06/09/2025    MPV 9.5 06/21/2024    GRAN 1.9 06/21/2024    GRAN 45.6 06/21/2024    LYMPH 52.8 (H) 06/09/2025    LYMPH 1.49 06/09/2025    MONO 11.7 06/09/2025    MONO 0.33 06/09/2025    BASO 0.03 06/21/2024    BASO 0.02 04/28/2024    NRBC 0 06/09/2025    NRBC 0 06/21/2024       CHEMISTRY & LIVER FUNCTION - LAST 2  Lab Results   Component Value Date     06/09/2025     06/21/2024    K 4.1 06/09/2025    K 5.2 (H) 06/21/2024     06/09/2025     06/21/2024    CO2 27 06/09/2025    CO2 28 06/21/2024    ANIONGAP 9 06/09/2025    ANIONGAP 9 06/21/2024    BUN 19 06/09/2025    BUN 16 06/21/2024    CREATININE 0.9 06/09/2025    CREATININE 1.0 06/21/2024    GLU 87 06/09/2025    GLU 91 06/21/2024    CALCIUM 9.1 06/09/2025    CALCIUM 9.5 06/21/2024    ALBUMIN 3.9 06/09/2025    ALBUMIN 4.1 06/21/2024    PROT 7.4 06/09/2025    PROT 7.2 06/21/2024    ALKPHOS 104 06/09/2025    ALKPHOS 103 06/21/2024    ALT 27 06/09/2025    ALT 69 (H) 06/21/2024    AST 20 06/09/2025    AST 36 06/21/2024    BILITOT 0.6 06/09/2025    BILITOT 0.4 06/21/2024        CARDIAC PROFILE - LAST 2  Lab Results   Component Value Date    BNP <10 08/27/2017    BNP 12 01/26/2017     (H) 11/11/2021     (H) 04/30/2019    TROPONINI <0.006 08/27/2017    TROPONINI 0.009 01/26/2017        COAGULATION - LAST 2  Lab Results   Component Value Date    INR 1.0 04/28/2024    INR 1.0 01/26/2017       ENDOCRINE & PSA - LAST 2  Lab Results   Component Value Date    HGBA1C 5.7 (H) 06/09/2025    HGBA1C 5.6 06/21/2024    TSH 1.150 06/09/2025    TSH  0.402 04/28/2024    PSA 0.57 06/09/2025        ECHOCARDIOGRAM RESULTS  No results found for this or any previous visit.      CURRENT/PREVIOUS VISIT EKG  Results for orders placed or performed in visit on 06/17/25   IN OFFICE EKG 12-LEAD (to [employer])    Collection Time: 06/17/25 10:41 AM   Result Value Ref Range    QRS Duration 92 ms    OHS QTC Calculation 428 ms    Narrative    Test Reason : R07.9,    Vent. Rate :  62 BPM     Atrial Rate :  62 BPM     P-R Int : 172 ms          QRS Dur :  92 ms      QT Int : 422 ms       P-R-T Axes :   6 -30 -39 degrees    QTcB Int : 428 ms    Normal sinus rhythm  Left axis deviation  Minimal voltage criteria for LVH, may be normal variant  T wave abnormality, consider lateral ischemia  Abnormal ECG  When compared with ECG of 05-Jun-2025 12:15,  No significant change was found    Referred By: JENSEN ORTIZ           Confirmed By:      No valid procedures specified.   No results found for this or any previous visit.    No valid procedures specified.          PREVIOUS STRESS TEST              PREVIOUS ANGIOGRAM        PHYSICAL EXAM    CONSTITUTIONAL: Well built, well nourished in no apparent distress  HEENT: No pallor  NECK: no JVD  LUNGS: CTA b/l  HEART: regular rate and rhythm, S1, S2 normal, no murmur   ABDOMEN:  Nondistended  EXTREMITIES: No edema  NEURO: AAO X 3   SKIN:  No rash  Psych:  Normal affect    I HAVE REVIEWED :    The vital signs, nurses notes, and all the pertinent recent radiology studies, cardiovascular studies and labs.  I have independently reviewed the patient's most recent EKG.        Current Outpatient Medications   Medication Instructions    azelastine (ASTELIN) 274 mcg, Nasal, 2 times daily    cyclobenzaprine (FLEXERIL) 10 mg, Oral, 3 times daily PRN    fluticasone propionate (FLONASE) 100 mcg, Each Nostril, 2 times daily    gabapentin (NEURONTIN) 300 mg, Oral, Nightly    losartan-hydrochlorothiazide 100-12.5 mg (HYZAAR) 100-12.5 mg Tab 1 tablet, Oral, Daily         Assessment and Plan     Chest pain-atypical- resolved   Hypertension  Dyslipidemia-intolerance to statin recent LDL 85  History of gastric bypass  Family history of coronary artery disease      EKG showed Sinus rhythm, borderline LVH, anterolateral T-wave inversion noted.  Had nonspecific anterior T-wave changes in the past as well  Had nuclear stress test which was negative for ischemia.  Did 11.5 Mets on treadmill with no anginal symptoms.  Low normal ejection fraction.  Echo nuclear stress test for further evaluation   Advised to take losartan hydrochlorothiazide regularly.  Home BP monitoring   Low-sodium low-fat diet.    Follow up in about 6 months (around 12/26/2025).     Chest pain, unspecified type    Hypertension, unspecified type    Other orders  -     losartan-hydrochlorothiazide 100-12.5 mg (HYZAAR) 100-12.5 mg Tab; Take 1 tablet by mouth once daily.  Dispense: 30 tablet; Refill: 5                  [1]   Social History  Tobacco Use    Smoking status: Never    Smokeless tobacco: Never   Substance Use Topics    Alcohol use: Yes     Comment: occasional, once a month 1-2 mixed vodka drink    Drug use: No

## 2025-06-27 ENCOUNTER — RESULTS FOLLOW-UP (OUTPATIENT)
Dept: CARDIOLOGY | Facility: CLINIC | Age: 50
End: 2025-06-27

## (undated) DEVICE — SUT MONOCRYL 4-0 PS-2

## (undated) DEVICE — SYR 10CC LUER LOCK

## (undated) DEVICE — TROCAR ENDOPATH XCEL 5X100MM

## (undated) DEVICE — COVER OVERHEAD SURG LT BLUE

## (undated) DEVICE — PACK ENDOSCOPY GENERAL

## (undated) DEVICE — SCISSOR CURVED ENDOPATH 5MM

## (undated) DEVICE — TUBING INSUFFLATION 10

## (undated) DEVICE — IRRIGATOR ENDOSCOPY DISP.

## (undated) DEVICE — APPLIER CLIP ENDO LIGAMAX 5MM

## (undated) DEVICE — BAG TISS RETRV MONARCH 10MM

## (undated) DEVICE — CHLORAPREP W TINT 26ML APPL

## (undated) DEVICE — GLOVE SURG BIOGEL LATEX SZ 7.5

## (undated) DEVICE — CLIPPER BLADE MOD 4406 (CAREF)

## (undated) DEVICE — SOL NS 1000CC

## (undated) DEVICE — DRESSING TRANS 2X2 TEGADERM

## (undated) DEVICE — NDL HYPO REG 25G X 1 1/2

## (undated) DEVICE — ELECTRODE REM PLYHSV RETURN 9

## (undated) DEVICE — GAUZE SPONGE 4X4 12PLY

## (undated) DEVICE — SEE MEDLINE ITEM 152622

## (undated) DEVICE — KIT ANTIFOG

## (undated) DEVICE — CANISTER SUCTION 2 LTR

## (undated) DEVICE — SUT VICRYL+ 27 UR-6 VIOL

## (undated) DEVICE — ELECTRODE BLADE INSULATED 1 IN

## (undated) DEVICE — DISSECTOR CURVED 5DCD

## (undated) DEVICE — CLOSURE SKIN STERI STRIP 1/2X4

## (undated) DEVICE — Device

## (undated) DEVICE — TROCAR KII BLLN 12MM 10CM

## (undated) DEVICE — GLOVE SURGICAL LATEX SZ 7

## (undated) DEVICE — BLANKET UPPER BODY 78.7X29.9IN

## (undated) DEVICE — SYR ONLY LUER LOCK 20CC